# Patient Record
Sex: FEMALE | Race: BLACK OR AFRICAN AMERICAN | NOT HISPANIC OR LATINO | Employment: FULL TIME | URBAN - METROPOLITAN AREA
[De-identification: names, ages, dates, MRNs, and addresses within clinical notes are randomized per-mention and may not be internally consistent; named-entity substitution may affect disease eponyms.]

---

## 2017-03-16 ENCOUNTER — HOSPITAL ENCOUNTER (EMERGENCY)
Facility: OTHER | Age: 77
Discharge: HOME | End: 2017-03-16
Attending: EMERGENCY MEDICINE
Payer: MEDICARE

## 2017-03-16 VITALS
BODY MASS INDEX: 24.33 KG/M2 | RESPIRATION RATE: 18 BRPM | HEART RATE: 92 BPM | OXYGEN SATURATION: 93 % | DIASTOLIC BLOOD PRESSURE: 73 MMHG | SYSTOLIC BLOOD PRESSURE: 161 MMHG | HEIGHT: 67 IN | TEMPERATURE: 99 F | WEIGHT: 155 LBS

## 2017-03-16 DIAGNOSIS — K80.50 BILIARY COLIC: Primary | ICD-10-CM

## 2017-03-16 LAB
ALBUMIN SERPL BCP-MCNC: 3.7 G/DL
ALP SERPL-CCNC: 257 U/L
ALT SERPL W/O P-5'-P-CCNC: 116 U/L
ANION GAP SERPL CALC-SCNC: 8 MMOL/L
AST SERPL-CCNC: 239 U/L
BASOPHILS # BLD AUTO: 0.02 K/UL
BASOPHILS NFR BLD: 0.2 %
BILIRUB SERPL-MCNC: 2.1 MG/DL
BILIRUB UR QL STRIP: NEGATIVE
BUN SERPL-MCNC: 13 MG/DL
CALCIUM SERPL-MCNC: 9.3 MG/DL
CHLORIDE SERPL-SCNC: 104 MMOL/L
CLARITY UR: ABNORMAL
CO2 SERPL-SCNC: 29 MMOL/L
COLOR UR: YELLOW
CREAT SERPL-MCNC: 0.8 MG/DL
DIFFERENTIAL METHOD: ABNORMAL
EOSINOPHIL # BLD AUTO: 0 K/UL
EOSINOPHIL NFR BLD: 0.5 %
ERYTHROCYTE [DISTWIDTH] IN BLOOD BY AUTOMATED COUNT: 12.8 %
EST. GFR  (AFRICAN AMERICAN): >60 ML/MIN/1.73 M^2
EST. GFR  (NON AFRICAN AMERICAN): >60 ML/MIN/1.73 M^2
GLUCOSE SERPL-MCNC: 127 MG/DL
GLUCOSE UR QL STRIP: NEGATIVE
HCT VFR BLD AUTO: 44.7 %
HGB BLD-MCNC: 14.9 G/DL
HGB UR QL STRIP: ABNORMAL
INR PPP: 0.9
KETONES UR QL STRIP: ABNORMAL
LEUKOCYTE ESTERASE UR QL STRIP: ABNORMAL
LIPASE SERPL-CCNC: 17 U/L
LYMPHOCYTES # BLD AUTO: 0.7 K/UL
LYMPHOCYTES NFR BLD: 9.1 %
MAGNESIUM SERPL-MCNC: 2.2 MG/DL
MCH RBC QN AUTO: 31.4 PG
MCHC RBC AUTO-ENTMCNC: 33.3 %
MCV RBC AUTO: 94 FL
MICROSCOPIC COMMENT: ABNORMAL
MONOCYTES # BLD AUTO: 0.2 K/UL
MONOCYTES NFR BLD: 2.3 %
NEUTROPHILS # BLD AUTO: 7.1 K/UL
NEUTROPHILS NFR BLD: 87 %
NITRITE UR QL STRIP: NEGATIVE
PH UR STRIP: 6 [PH] (ref 5–8)
PLATELET # BLD AUTO: 210 K/UL
PMV BLD AUTO: 9.4 FL
POTASSIUM SERPL-SCNC: 4.2 MMOL/L
PROT SERPL-MCNC: 7.8 G/DL
PROT UR QL STRIP: NEGATIVE
PROTHROMBIN TIME: 10.4 SEC
RBC # BLD AUTO: 4.75 M/UL
RBC #/AREA URNS HPF: 5 /HPF (ref 0–4)
SODIUM SERPL-SCNC: 141 MMOL/L
SP GR UR STRIP: 1.02 (ref 1–1.03)
SQUAMOUS #/AREA URNS HPF: 15 /HPF
URN SPEC COLLECT METH UR: ABNORMAL
UROBILINOGEN UR STRIP-ACNC: NEGATIVE EU/DL
WBC # BLD AUTO: 8.11 K/UL
WBC #/AREA URNS HPF: 15 /HPF (ref 0–5)

## 2017-03-16 PROCEDURE — 99284 EMERGENCY DEPT VISIT MOD MDM: CPT

## 2017-03-16 PROCEDURE — 85025 COMPLETE CBC W/AUTO DIFF WBC: CPT

## 2017-03-16 PROCEDURE — 83735 ASSAY OF MAGNESIUM: CPT

## 2017-03-16 PROCEDURE — 83690 ASSAY OF LIPASE: CPT

## 2017-03-16 PROCEDURE — 81000 URINALYSIS NONAUTO W/SCOPE: CPT

## 2017-03-16 PROCEDURE — 80053 COMPREHEN METABOLIC PANEL: CPT

## 2017-03-16 PROCEDURE — 85610 PROTHROMBIN TIME: CPT

## 2017-03-16 RX ORDER — BENZONATATE 100 MG/1
100 CAPSULE ORAL 3 TIMES DAILY PRN
Qty: 15 CAPSULE | Refills: 0 | Status: SHIPPED | OUTPATIENT
Start: 2017-03-16 | End: 2017-03-26

## 2017-03-16 NOTE — ED NOTES
Pt to er with c/o had right upper abd pain this afternoon now painfree. Pt states this pain comes couple of times a year . She has gallstone but refuses to have surgery.

## 2017-03-16 NOTE — DISCHARGE INSTRUCTIONS
Gallstones with Biliary Colic    You have abdominal pain due to irritation and spasm of the gallbladder. This is called biliary colic. The gallbladder is a small sac under the liver, which stores and releases a fluid that aids in the digestion of fat. A collection of crystals may form stones inside the gallbladder (gallstones). Gallstones can cause the gallbladder to spasm. If they block the duct out of the gallbladder, they can cause pain and even an infection.   A number of factors increase the risk for having gallstones:  · Being female  · Being severely overweight (obese)  · Older age  · Losing or gaining weight quickly  · Eating a high-calorie diet  · Being pregnant  · Taking hormone therapy  · Having diabetes  Home care  · Rest in bed.  · Drink only clear liquids until you feel better.  · You may have been prescribed medicine for pain or nausea. Take these as directed.  · Fat in your diet makes the gallbladder contract and may cause increased pain. Avoid foods that are high in fat (such as full-fat dairy, fried foods, and fatty meats) for at least two days.  · If you are overweight, talk to your healthcare provider about losing weight.  Follow-up care  Follow up with your healthcare provider or as advised. There is a chance that you will have another episode of pain from your gallstones at some point. Removal of the gallbladder is an option to prevent this. Talk with your healthcare provider about your treatment options.  When to seek medical advice  Call your healthcare provider if any of the following occur:  · Worsening pain or pain lasting for longer than 6 hours  · Pain moving to the right lower abdomen  · Repeated vomiting  · Swollen abdomen  · Fever of 100.4ºF (38ºC) or higher, or as directed by your healthcare provider  · Very dark urine, light colored stools, or yellow color of the skin or eyes  · Chest, arm, back, neck or jaw pain  Date Last Reviewed: 6/18/2015  © 6080-7398 [employer],  [employer]. [address] [place] [address]. All rights reserved. This information is not intended as a substitute for professional medical care. Always follow your healthcare professional's instructions.          Treating Gallstones    The gallbladder is an organ that stores bile. This is a substance that helps with digestion. Deposits in bile can clump together, creating hard, pebble-like stones. These gallstones may not cause any symptoms. In most cases, gallstones have no symptoms. In some cases, though, they irritate the walls of the gallbladder. Or they can block flow of bile out of the gallbladder. If they fall into the common bile duct, stones can block the flow of bile into the small bowel. This can lead to jaundice, pain, or serious infection. Stones are treated only if you have symptoms. If treatment is needed, your healthcare provider will discuss your choices with you. The most common treatments are listed below.  Medicine  Medicines to dissolve gallstones don't really work.   ERCP  ERCP (endoscopic retrograde cholangiopancreatography) is an outpatient procedure that needs heavy sedation to remove stones. It uses a thin tube with video and X-rays to locate stones blocking the flow of bile. The stones are then removed. ERCP may be done alone. Or it may be used before surgery is done to remove the gallbladder.  Surgery  A cholecystectomy is an operation to remove the gallbladder and its contents (gallstones). Today, most cholecystectomies are done laparoscopically. This means using several very small abdominal incisions. Cholecystectomy may also be done with the traditional single, larger incision.   Prevent future symptoms  After treatment, you should follow a low-fat diet. This diet includes lean meats, lean poultry, and fish. Avoid full-fat dairy products. And limit vegetable oils. Read food labels to be sure theyre low in fat.   Date Last Reviewed: 5/1/2016  © 2798-5011 [employer].  [address] [place] [address]. All rights reserved. This information is not intended as a substitute for professional medical care. Always follow your healthcare professional's instructions.

## 2017-03-16 NOTE — ED NOTES
Lab notified ED that the specimen hemolyzed. ED requested that a phlebotomist be sent down to draw the lab.

## 2017-03-16 NOTE — ED PROVIDER NOTES
Encounter Date: 3/16/2017    SCRIBE #1 NOTE: I, Enriqueta Ricci, am scribing for, and in the presence of, Dr. Ramirez.       History     Chief Complaint   Patient presents with    Abdominal Pain     Pt c/o RUQ abdominal pain x2 days. Pt states she took a norco and denies pain at this time. Pt states she has known gall bladder issues and has been told she should have it removed but refused.      Review of patient's allergies indicates:  No Known Allergies  HPI Comments: 03/16/2017 3:20 PM     Chief Complaint: Abdominal pain    The patient is a 76 y.o. female with HTN and gallstones who presents to the ED with a sudden onset of RUQ abdominal pain PTA. The pain was rated 10/10 but resolved now after taking a [place] PTA. The patient reports prior similar symptoms 2 days ago, and attributed it to eating fatty foods and recent URI. Pain similar to an episode of gallstones pancreatitis dx in September. She was then advised admission, but she decided to leave and follow-up with Dr. Lopez. She did not follow-up with Dr. Lopez but changed her diet with minimal symptoms until this week. The patient had emesis with pain 2d ago but none now. She denies fever, CP/SOB, blood in stool, diarrhea, or any other symptoms at this time. No pertinent SHx noted. No known drug allergies noted.    The history is provided by the patient.     Past Medical History:   Diagnosis Date    Gallstones     Hypertension      Past Surgical History:   Procedure Laterality Date    HYSTERECTOMY       History reviewed. No pertinent family history.  Social History   Substance Use Topics    Smoking status: Never Smoker    Smokeless tobacco: None    Alcohol use Yes      Comment: socially     Review of Systems   Constitutional: Positive for diaphoresis. Negative for chills and fever.   HENT: Negative for congestion, rhinorrhea, sneezing and sore throat.    Eyes: Negative for visual disturbance.   Respiratory: Positive for cough. Negative for shortness  of breath.    Cardiovascular: Negative for chest pain and palpitations.   Gastrointestinal: Positive for abdominal pain (RUQ) and vomiting (resolved). Negative for diarrhea and nausea.   Genitourinary: Negative for dysuria and hematuria.   Musculoskeletal: Negative for back pain and neck pain.   Skin: Negative for rash.   Neurological: Negative for seizures, syncope and headaches.     Physical Exam   Initial Vitals   BP Pulse Resp Temp SpO2   03/16/17 1415 03/16/17 1415 03/16/17 1415 03/16/17 1415 03/16/17 1415   181/82 72 18 98.6 °F (37 °C) 96 %     Physical Exam    Nursing note and vitals reviewed.  Constitutional: No distress.   HENT:   Mouth/Throat: Oropharynx is clear and moist. No oropharyngeal exudate.   Neck: Normal range of motion. Neck supple.   Cardiovascular: Normal rate, regular rhythm and normal heart sounds.   No murmur heard.  Pulmonary/Chest: Breath sounds normal. She has no wheezes. She has no rhonchi. She has no rales.   Abdominal: Soft. There is tenderness (mild) in the right upper quadrant. There is positive Baca's sign. There is no rebound and no guarding.   Musculoskeletal: She exhibits no edema.   Neurological: She is alert and oriented to person, place, and time. She has normal strength. No cranial nerve deficit or sensory deficit.   Skin: No rash noted.       ED Course   Procedures  Labs Reviewed   CBC W/ AUTO DIFFERENTIAL - Abnormal; Notable for the following:        Result Value    MCH 31.4 (*)     Lymph # 0.7 (*)     Mono # 0.2 (*)     Gran% 87.0 (*)     Lymph% 9.1 (*)     Mono% 2.3 (*)     All other components within normal limits   COMPREHENSIVE METABOLIC PANEL - Abnormal; Notable for the following:     Glucose 127 (*)     Total Bilirubin 2.1 (*)     Alkaline Phosphatase 257 (*)      (*)      (*)     All other components within normal limits    Narrative:     Recoll. [id number] by COLBY at 03/16/2017 16:18, reason: specimen   hemolyzed called to : Gabino Archer RN    URINALYSIS - Abnormal; Notable for the following:     Appearance, UA Hazy (*)     Ketones, UA Trace (*)     Occult Blood UA 1+ (*)     Leukocytes, UA 2+ (*)     All other components within normal limits   URINALYSIS MICROSCOPIC - Abnormal; Notable for the following:     RBC, UA 5 (*)     WBC, UA 15 (*)     All other components within normal limits   LIPASE    Narrative:     Recoll. [id number] by [email or web address] at 03/16/2017 16:18, reason: specimen   hemolyzed called to : Gabino Archer RN   MAGNESIUM    Narrative:     Recoll. [id number] by [email or web address] at 03/16/2017 16:18, reason: specimen   hemolyzed called to : Gabino Archer RN   PROTIME-INR     Imaging Results         US Abdomen Limited (Final result) Result time:  03/16/17 17:34:23    Final result by Phoenix Benavidez MD (03/16/17 17:34:23)    Impression:      1.  Gallbladder sludge/calculi, without convincing sonographic evidence of acute cholecystitis.    2.  Possible hepatic steatosis, correlation with LFTs recommended.      Electronically signed by: PHOENIX BENAVIDEZ MD  Date:     03/16/17  Time:    17:34     Narrative:    Ultrasound abdomen limited    Clinical history: Right upper quadrant pain    Comparison: 9/5/2016    Technique:  Real-time sonography was performed of the abdomen using transabdominal technique.    Findings:  The visualized pancreas is unremarkable.  The visualized aorta is unremarkable.  The liver is not enlarged, measuring 15 cm noting increased echogenicity suggestive of steatosis.  The gallbladder is mildly distended.  There is gallbladder sludge and/or calculi.  The gallbladder wall is irregular, without hyperemia.  The common duct is not dilated measuring 0.5 cm.  Sonographic Baca's sign is reportedly negative.  No ascites.  The visualized right kidney is unremarkable.                  Medical Decision Making:   History:   Old Medical Records: I decided to obtain old medical records.  Old Records Summarized: other records.  Initial Assessment:        Patient is a 76 y.o. female with known history of gallstones who presents after an episodes of RUQ pain, improved after taking [place] PTA. She had episode of gallstone pancreatitis last year but refused admission then and did not follow up with Dr. Lopez/Surgery since then. She has changed her diet with minimal episodes of RUQ pain since then until 2 days ago and today, attributed to dietary discretion, recent URI, and stress. Possible biliary colic. Will also evaluate for gallstone pancreatitis and acute cholecystitis.     Update:  Labs show normal white count and chronically elevated LFT's. No sign of pancreatitis. US shows gallstones but no sign of acute cholecystitis. Patient has follow-up with surgery/Dr. Lopez in 5 days and will continue appropriate diet and pain control until then. She also requested treatment for cough from viral URI, so will give Tessalon. No sign PNA. She will return for any worsening pain or other concerns.      Clinical Tests:   Lab Tests: Reviewed and Ordered  Radiological Study: Reviewed and Ordered            Scribe Attestation:   Scribe #1: I performed the above scribed service and the documentation accurately describes the services I performed. I attest to the accuracy of the note.    Attending Attestation:           Physician Attestation for Scribe:  Physician Attestation Statement for Scribe #1: I, Dr. Ramirez, reviewed documentation, as scribed by Enriqueta Ricci in my presence, and it is both accurate and complete.                 ED Course     Clinical Impression:     1. Biliary colic          Disposition:   Disposition: Discharged  Condition: Stable       Luigi Ramirez MD  03/17/17 0124

## 2017-03-16 NOTE — ED AVS SNAPSHOT
[facility]  [address]  [facility] [address]               Karis Briceño   3/16/2017  3:07 PM   ED    Description:  Female : 1940   Department:  [facility]           Your Care was Coordinated By:     Provider Role From To    Luigi Ramirez MD Attending Provider [id number] --      Reason for Visit     Abdominal Pain           Diagnoses this Visit        Comments    Biliary colic    -  Primary       ED Disposition     None           To Do List           Follow-up Information     Follow up with Bill Lopez Jr, MD. Schedule an appointment as soon as possible for a visit in 3 days.    Specialties:  Vascular Surgery, General Surgery    Why:  If symptoms worsen    Contact information:    [address]  [address]  [facility] [address]  [phone]         These Medications        Disp Refills Start End    benzonatate (TESSALON) 100 MG capsule 15 capsule 0 3/16/2017 3/26/2017    Take 1 capsule (100 mg total) by mouth 3 (three) times daily as needed for Cough. - Oral      [facility] On [facility]     [facility] -  Assistance  Registered nurses in the [facility] provide clinical advisement, health education, appointment booking, and other advisory services.  Call for this free service at [phone].             Medications           Message regarding Medications     Verify the changes and/or additions to your medication regime listed below are the same as discussed with your clinician today.  If any of these changes or additions are incorrect, please notify your healthcare provider.        START taking these NEW medications        Refills    benzonatate (TESSALON) 100 MG capsule 0    Sig: Take 1 capsule (100 mg total) by mouth 3 (three) times daily as needed for Cough.    Class: Print    Route: Oral           Verify that the below list of medications is an accurate representation of the medications you  "are currently taking.  If none reported, the list may be blank. If incorrect, please contact your healthcare provider. Carry this list with you in case of emergency.           Current Medications     amlodipine (NORVASC) 5 MG tablet Take 2 tablets (10 mg total) by mouth once daily.    hydrocodone-acetaminophen 5-325mg (NORCO) 5-325 mg per tablet Take 1 tablet by mouth every 4 (four) hours as needed for Pain.    benzonatate (TESSALON) 100 MG capsule Take 1 capsule (100 mg total) by mouth 3 (three) times daily as needed for Cough.           Clinical Reference Information           Your Vitals Were     BP Pulse Temp Resp Height Weight    167/101 84 98.6 °F (37 °C) (Oral) 18 5' 6.5" (1.689 m) 70.3 kg (155 lb)    SpO2 BMI             96% 24.64 kg/m2         Allergies as of 3/16/2017     No Known Allergies      Immunizations Administered on Date of Encounter - 3/16/2017     None      ED Micro, Lab, POCT     Start Ordered       Status Ordering Provider    03/16/17 1533 03/16/17 1532  CBC auto differential  STAT      Final result     03/16/17 1533 03/16/17 1532  Comprehensive metabolic panel  STAT      Final result     03/16/17 1533 03/16/17 1532  Lipase  STAT      Final result     03/16/17 1533 03/16/17 1532  Magnesium  STAT      Final result     03/16/17 1533 03/16/17 1532  Protime-INR  STAT      Final result     03/16/17 1533 03/16/17 1532  Urinalysis  STAT      Final result     03/16/17 1532 03/16/17 1532  Urinalysis Microscopic  Once      Final result     03/16/17 1417 03/16/17 1416    Once,   Status:  Canceled      Canceled       ED Imaging Orders     Start Ordered       Status Ordering Provider    03/16/17 1618 03/16/17 1618  US Abdomen Limited  1 time imaging      Final result     03/16/17 1533 03/16/17 1532    1 time imaging,   Status:  Canceled      Canceled         Discharge Instructions         Gallstones with Biliary Colic    You have abdominal pain due to irritation and spasm of the gallbladder. This is called " biliary colic. The gallbladder is a small sac under the liver, which stores and releases a fluid that aids in the digestion of fat. A collection of crystals may form stones inside the gallbladder (gallstones). Gallstones can cause the gallbladder to spasm. If they block the duct out of the gallbladder, they can cause pain and even an infection.   A number of factors increase the risk for having gallstones:  · Being female  · Being severely overweight (obese)  · Older age  · Losing or gaining weight quickly  · Eating a high-calorie diet  · Being pregnant  · Taking hormone therapy  · Having diabetes  Home care  · Rest in bed.  · Drink only clear liquids until you feel better.  · You may have been prescribed medicine for pain or nausea. Take these as directed.  · Fat in your diet makes the gallbladder contract and may cause increased pain. Avoid foods that are high in fat (such as full-fat dairy, fried foods, and fatty meats) for at least two days.  · If you are overweight, talk to your healthcare provider about losing weight.  Follow-up care  Follow up with your healthcare provider or as advised. There is a chance that you will have another episode of pain from your gallstones at some point. Removal of the gallbladder is an option to prevent this. Talk with your healthcare provider about your treatment options.  When to seek medical advice  Call your healthcare provider if any of the following occur:  · Worsening pain or pain lasting for longer than 6 hours  · Pain moving to the right lower abdomen  · Repeated vomiting  · Swollen abdomen  · Fever of 100.4ºF (38ºC) or higher, or as directed by your healthcare provider  · Very dark urine, light colored stools, or yellow color of the skin or eyes  · Chest, arm, back, neck or jaw pain  Date Last Reviewed: 6/18/2015  © 8327-0254 "[employer]. [address] [place] [address], [place] [address]. All rights reserved. This information is not intended as a substitute for  professional medical care. Always follow your healthcare professional's instructions.          Treating Gallstones    The gallbladder is an organ that stores bile. This is a substance that helps with digestion. Deposits in bile can clump together, creating hard, pebble-like stones. These gallstones may not cause any symptoms. In most cases, gallstones have no symptoms. In some cases, though, they irritate the walls of the gallbladder. Or they can block flow of bile out of the gallbladder. If they fall into the common bile duct, stones can block the flow of bile into the small bowel. This can lead to jaundice, pain, or serious infection. Stones are treated only if you have symptoms. If treatment is needed, your healthcare provider will discuss your choices with you. The most common treatments are listed below.  Medicine  Medicines to dissolve gallstones don't really work.   ERCP  ERCP (endoscopic retrograde cholangiopancreatography) is an outpatient procedure that needs heavy sedation to remove stones. It uses a thin tube with video and X-rays to locate stones blocking the flow of bile. The stones are then removed. ERCP may be done alone. Or it may be used before surgery is done to remove the gallbladder.  Surgery  A cholecystectomy is an operation to remove the gallbladder and its contents (gallstones). Today, most cholecystectomies are done laparoscopically. This means using several very small abdominal incisions. Cholecystectomy may also be done with the traditional single, larger incision.   Prevent future symptoms  After treatment, you should follow a low-fat diet. This diet includes lean meats, lean poultry, and fish. Avoid full-fat dairy products. And limit vegetable oils. Read food labels to be sure theyre low in fat.   Date Last Reviewed: 5/1/2016  © 9541-3712 [employer]. [address] [place] [address], [place] [address]. All rights reserved. This information is not intended as a substitute for  professional medical care. Always follow your healthcare professional's instructions.          Discharge References/Attachments     URI, VIRAL, NO ABX (ADULT) (ENGLISH)      [facility] Sign-Up     Activating your [facility] account is as easy as 1-2-3!     1) Visit [email or web address], select Sign Up Now, enter this activation code and your date of birth, then select Next.  [email or web address]  Expires: 4/30/2017  6:09 PM      2) Create a username and password to use when you visit [facility] in the future and select a security question in case you lose your password and select Next.    3) Enter your e-mail address and click Sign Up!    Additional Information  If you have questions, please e-mail [email or web address] or call [phone] to talk to our [facility] staff. Remember, [facility] is NOT to be used for urgent needs. For medical emergencies, dial 911.          [facility] complies with applicable Federal civil rights laws and does not discriminate on the basis of race, color, national origin, age, disability, or sex.        Language Assistance Services     ATTENTION: Language assistance services are available, free of charge. Please call [phone].      ATENCIÓN: Si habla joanie, tiene a turner disposición servicios gratuitos de asistencia lingüística. Llame al [phone].     [facility] Ý: N?u b?n nói Ti?ng Vi?t, có các d?ch v? h? tr? ngôn ng? mi?n phí dành cho b?n. G?i s? [phone].

## 2018-09-05 ENCOUNTER — HOSPITAL ENCOUNTER (OUTPATIENT)
Dept: RADIOLOGY | Facility: OTHER | Age: 78
Discharge: HOME | End: 2018-09-05
Attending: OTOLARYNGOLOGY
Payer: MEDICARE

## 2018-09-05 DIAGNOSIS — Z01.812 PRE-OPERATIVE LABORATORY EXAMINATION: Primary | ICD-10-CM

## 2018-09-05 PROCEDURE — 25500020 PHARM REV CODE 255: Performed by: OTOLARYNGOLOGY

## 2018-09-05 PROCEDURE — 70481 CT ORBIT/EAR/FOSSA W/DYE: CPT | Mod: TC

## 2018-09-05 PROCEDURE — 70481 CT ORBIT/EAR/FOSSA W/DYE: CPT | Mod: 26,,, | Performed by: RADIOLOGY

## 2018-09-05 RX ADMIN — IOHEXOL 75 ML: 350 INJECTION, SOLUTION INTRAVENOUS at 10:09

## 2019-09-03 ENCOUNTER — HOSPITAL ENCOUNTER (OUTPATIENT)
Facility: OTHER | Age: 79
Discharge: HOME | End: 2019-09-05
Attending: EMERGENCY MEDICINE | Admitting: EMERGENCY MEDICINE
Payer: MEDICARE

## 2019-09-03 DIAGNOSIS — R94.31 ABNORMAL EKG: ICD-10-CM

## 2019-09-03 DIAGNOSIS — I10 HYPERTENSION, UNCONTROLLED: ICD-10-CM

## 2019-09-03 DIAGNOSIS — I10 HYPERTENSION: ICD-10-CM

## 2019-09-03 DIAGNOSIS — R79.89 ELEVATED BRAIN NATRIURETIC PEPTIDE (BNP) LEVEL: ICD-10-CM

## 2019-09-03 DIAGNOSIS — I16.9 HYPERTENSIVE CRISIS: ICD-10-CM

## 2019-09-03 DIAGNOSIS — R07.9 CHEST PAIN: ICD-10-CM

## 2019-09-03 DIAGNOSIS — R79.89 ELEVATED TROPONIN: Primary | ICD-10-CM

## 2019-09-03 LAB
ALBUMIN SERPL BCP-MCNC: 3.8 G/DL (ref 3.5–5.2)
ALP SERPL-CCNC: 54 U/L (ref 55–135)
ALT SERPL W/O P-5'-P-CCNC: 18 U/L (ref 10–44)
ANION GAP SERPL CALC-SCNC: 10 MMOL/L (ref 8–16)
AST SERPL-CCNC: 22 U/L (ref 10–40)
BACTERIA #/AREA URNS HPF: NORMAL /HPF
BASOPHILS # BLD AUTO: 0.04 K/UL (ref 0–0.2)
BASOPHILS NFR BLD: 0.8 % (ref 0–1.9)
BILIRUB SERPL-MCNC: 0.5 MG/DL (ref 0.1–1)
BILIRUB UR QL STRIP: NEGATIVE
BNP SERPL-MCNC: 140 PG/ML (ref 0–99)
BUN SERPL-MCNC: 16 MG/DL (ref 8–23)
CALCIUM SERPL-MCNC: 9.1 MG/DL (ref 8.7–10.5)
CHLORIDE SERPL-SCNC: 104 MMOL/L (ref 95–110)
CLARITY UR: CLEAR
CO2 SERPL-SCNC: 26 MMOL/L (ref 23–29)
COLOR UR: YELLOW
CREAT SERPL-MCNC: 0.8 MG/DL (ref 0.5–1.4)
DIFFERENTIAL METHOD: ABNORMAL
EOSINOPHIL # BLD AUTO: 0.2 K/UL (ref 0–0.5)
EOSINOPHIL NFR BLD: 4.7 % (ref 0–8)
ERYTHROCYTE [DISTWIDTH] IN BLOOD BY AUTOMATED COUNT: 12.3 % (ref 11.5–14.5)
EST. GFR  (AFRICAN AMERICAN): >60 ML/MIN/1.73 M^2
EST. GFR  (NON AFRICAN AMERICAN): >60 ML/MIN/1.73 M^2
GLUCOSE SERPL-MCNC: 117 MG/DL (ref 70–110)
GLUCOSE UR QL STRIP: NEGATIVE
HCT VFR BLD AUTO: 43.7 % (ref 37–48.5)
HGB BLD-MCNC: 14.3 G/DL (ref 12–16)
HGB UR QL STRIP: ABNORMAL
IMM GRANULOCYTES # BLD AUTO: 0.03 K/UL (ref 0–0.04)
IMM GRANULOCYTES NFR BLD AUTO: 0.6 % (ref 0–0.5)
KETONES UR QL STRIP: NEGATIVE
LEUKOCYTE ESTERASE UR QL STRIP: ABNORMAL
LYMPHOCYTES # BLD AUTO: 1.9 K/UL (ref 1–4.8)
LYMPHOCYTES NFR BLD: 36.2 % (ref 18–48)
MCH RBC QN AUTO: 31.2 PG (ref 27–31)
MCHC RBC AUTO-ENTMCNC: 32.7 G/DL (ref 32–36)
MCV RBC AUTO: 95 FL (ref 82–98)
MICROSCOPIC COMMENT: NORMAL
MONOCYTES # BLD AUTO: 0.3 K/UL (ref 0.3–1)
MONOCYTES NFR BLD: 6.6 % (ref 4–15)
NEUTROPHILS # BLD AUTO: 2.6 K/UL (ref 1.8–7.7)
NEUTROPHILS NFR BLD: 51.1 % (ref 38–73)
NITRITE UR QL STRIP: NEGATIVE
NRBC BLD-RTO: 0 /100 WBC
PH UR STRIP: 8 [PH] (ref 5–8)
PLATELET # BLD AUTO: 203 K/UL (ref 150–350)
PMV BLD AUTO: 9.3 FL (ref 9.2–12.9)
POTASSIUM SERPL-SCNC: 3.8 MMOL/L (ref 3.5–5.1)
PROT SERPL-MCNC: 7.2 G/DL (ref 6–8.4)
PROT UR QL STRIP: NEGATIVE
RBC # BLD AUTO: 4.59 M/UL (ref 4–5.4)
RBC #/AREA URNS HPF: 1 /HPF (ref 0–4)
SODIUM SERPL-SCNC: 140 MMOL/L (ref 136–145)
SP GR UR STRIP: 1.01 (ref 1–1.03)
TROPONIN I SERPL DL<=0.01 NG/ML-MCNC: 0.05 NG/ML (ref 0–0.03)
URN SPEC COLLECT METH UR: ABNORMAL
UROBILINOGEN UR STRIP-ACNC: NEGATIVE EU/DL
WBC # BLD AUTO: 5.16 K/UL (ref 3.9–12.7)
WBC #/AREA URNS HPF: 2 /HPF (ref 0–5)

## 2019-09-03 PROCEDURE — 84484 ASSAY OF TROPONIN QUANT: CPT

## 2019-09-03 PROCEDURE — 81000 URINALYSIS NONAUTO W/SCOPE: CPT

## 2019-09-03 PROCEDURE — 93010 EKG 12-LEAD: ICD-10-PCS | Mod: ,,, | Performed by: INTERNAL MEDICINE

## 2019-09-03 PROCEDURE — 93010 ELECTROCARDIOGRAM REPORT: CPT | Mod: ,,, | Performed by: INTERNAL MEDICINE

## 2019-09-03 PROCEDURE — 85025 COMPLETE CBC W/AUTO DIFF WBC: CPT

## 2019-09-03 PROCEDURE — 25000003 PHARM REV CODE 250: Performed by: EMERGENCY MEDICINE

## 2019-09-03 PROCEDURE — 99285 EMERGENCY DEPT VISIT HI MDM: CPT | Mod: 25

## 2019-09-03 PROCEDURE — 80053 COMPREHEN METABOLIC PANEL: CPT

## 2019-09-03 PROCEDURE — 63600175 PHARM REV CODE 636 W HCPCS: Performed by: EMERGENCY MEDICINE

## 2019-09-03 PROCEDURE — 93005 ELECTROCARDIOGRAM TRACING: CPT

## 2019-09-03 PROCEDURE — G0378 HOSPITAL OBSERVATION PER HR: HCPCS

## 2019-09-03 PROCEDURE — 83880 ASSAY OF NATRIURETIC PEPTIDE: CPT

## 2019-09-03 RX ORDER — CLONIDINE HYDROCHLORIDE 0.1 MG/1
0.2 TABLET ORAL
Status: COMPLETED | OUTPATIENT
Start: 2019-09-03 | End: 2019-09-03

## 2019-09-03 RX ORDER — HYDRALAZINE HYDROCHLORIDE 20 MG/ML
10 INJECTION INTRAMUSCULAR; INTRAVENOUS EVERY 6 HOURS PRN
Status: DISCONTINUED | OUTPATIENT
Start: 2019-09-03 | End: 2019-09-05

## 2019-09-03 RX ORDER — NITROGLYCERIN 0.4 MG/1
0.4 TABLET SUBLINGUAL
Status: COMPLETED | OUTPATIENT
Start: 2019-09-03 | End: 2019-09-03

## 2019-09-03 RX ORDER — HYDRALAZINE HYDROCHLORIDE 20 MG/ML
10 INJECTION INTRAMUSCULAR; INTRAVENOUS EVERY 6 HOURS PRN
Status: DISCONTINUED | OUTPATIENT
Start: 2019-09-04 | End: 2019-09-03

## 2019-09-03 RX ORDER — AMLODIPINE BESYLATE 5 MG/1
5 TABLET ORAL
Status: COMPLETED | OUTPATIENT
Start: 2019-09-03 | End: 2019-09-03

## 2019-09-03 RX ORDER — AMLODIPINE BESYLATE 5 MG/1
5 TABLET ORAL DAILY
Status: DISCONTINUED | OUTPATIENT
Start: 2019-09-04 | End: 2019-09-03

## 2019-09-03 RX ORDER — ASPIRIN 325 MG
325 TABLET ORAL
Status: COMPLETED | OUTPATIENT
Start: 2019-09-03 | End: 2019-09-03

## 2019-09-03 RX ADMIN — CLONIDINE HYDROCHLORIDE 0.2 MG: 0.1 TABLET ORAL at 08:09

## 2019-09-03 RX ADMIN — AMLODIPINE BESYLATE 5 MG: 5 TABLET ORAL at 11:09

## 2019-09-03 RX ADMIN — NITROGLYCERIN 0.4 MG: 0.4 TABLET, ORALLY DISINTEGRATING SUBLINGUAL at 11:09

## 2019-09-03 RX ADMIN — ASPIRIN 325 MG ORAL TABLET 325 MG: 325 PILL ORAL at 11:09

## 2019-09-04 ENCOUNTER — HOSPITAL ENCOUNTER (OUTPATIENT)
Dept: CARDIOLOGY | Facility: OTHER | Age: 79
Discharge: HOME | End: 2019-09-04
Attending: EMERGENCY MEDICINE
Payer: MEDICARE

## 2019-09-04 LAB
TROPONIN I SERPL DL<=0.01 NG/ML-MCNC: 0.05 NG/ML (ref 0–0.03)
TROPONIN I SERPL DL<=0.01 NG/ML-MCNC: 0.05 NG/ML (ref 0–0.03)

## 2019-09-04 PROCEDURE — 25000003 PHARM REV CODE 250: Performed by: PHYSICIAN ASSISTANT

## 2019-09-04 PROCEDURE — 99220 PR INITIAL OBSERVATION CARE,LEVL III: ICD-10-PCS | Mod: ,,, | Performed by: PHYSICIAN ASSISTANT

## 2019-09-04 PROCEDURE — 94761 N-INVAS EAR/PLS OXIMETRY MLT: CPT

## 2019-09-04 PROCEDURE — 93010 ELECTROCARDIOGRAM REPORT: CPT | Mod: ,,, | Performed by: INTERNAL MEDICINE

## 2019-09-04 PROCEDURE — 93010 EKG 12-LEAD: ICD-10-PCS | Mod: ,,, | Performed by: INTERNAL MEDICINE

## 2019-09-04 PROCEDURE — 99220 PR INITIAL OBSERVATION CARE,LEVL III: CPT | Mod: ,,, | Performed by: PHYSICIAN ASSISTANT

## 2019-09-04 PROCEDURE — 99900035 HC TECH TIME PER 15 MIN (STAT)

## 2019-09-04 PROCEDURE — 93306 TTE W/DOPPLER COMPLETE: CPT

## 2019-09-04 PROCEDURE — G0378 HOSPITAL OBSERVATION PER HR: HCPCS

## 2019-09-04 PROCEDURE — 36415 COLL VENOUS BLD VENIPUNCTURE: CPT

## 2019-09-04 PROCEDURE — 63600175 PHARM REV CODE 636 W HCPCS: Performed by: PHYSICIAN ASSISTANT

## 2019-09-04 PROCEDURE — 84484 ASSAY OF TROPONIN QUANT: CPT

## 2019-09-04 PROCEDURE — 93306 ECHO (CUPID ONLY): ICD-10-PCS | Mod: 26,,, | Performed by: INTERNAL MEDICINE

## 2019-09-04 PROCEDURE — 93005 ELECTROCARDIOGRAM TRACING: CPT

## 2019-09-04 PROCEDURE — 93306 TTE W/DOPPLER COMPLETE: CPT | Mod: 26,,, | Performed by: INTERNAL MEDICINE

## 2019-09-04 RX ORDER — ENOXAPARIN SODIUM 100 MG/ML
40 INJECTION SUBCUTANEOUS EVERY 24 HOURS
Status: DISCONTINUED | OUTPATIENT
Start: 2019-09-04 | End: 2019-09-05 | Stop reason: HOSPADM

## 2019-09-04 RX ORDER — HYDROCHLOROTHIAZIDE 12.5 MG/1
12.5 TABLET ORAL DAILY
Qty: 30 TABLET | Refills: 0 | Status: SHIPPED | OUTPATIENT
Start: 2019-09-04 | End: 2019-09-11 | Stop reason: SDUPTHER

## 2019-09-04 RX ORDER — ONDANSETRON 8 MG/1
8 TABLET, ORALLY DISINTEGRATING ORAL EVERY 8 HOURS PRN
Status: DISCONTINUED | OUTPATIENT
Start: 2019-09-04 | End: 2019-09-05 | Stop reason: HOSPADM

## 2019-09-04 RX ORDER — HEPARIN SODIUM 5000 [USP'U]/ML
5000 INJECTION, SOLUTION INTRAVENOUS; SUBCUTANEOUS EVERY 8 HOURS
Status: DISCONTINUED | OUTPATIENT
Start: 2019-09-04 | End: 2019-09-04

## 2019-09-04 RX ORDER — HYDROCHLOROTHIAZIDE 12.5 MG/1
12.5 TABLET ORAL DAILY
Status: DISCONTINUED | OUTPATIENT
Start: 2019-09-04 | End: 2019-09-05 | Stop reason: HOSPADM

## 2019-09-04 RX ORDER — SODIUM CHLORIDE 0.9 % (FLUSH) 0.9 %
10 SYRINGE (ML) INJECTION
Status: DISCONTINUED | OUTPATIENT
Start: 2019-09-04 | End: 2019-09-05 | Stop reason: HOSPADM

## 2019-09-04 RX ORDER — AMLODIPINE BESYLATE 10 MG/1
10 TABLET ORAL DAILY
Qty: 30 TABLET | Refills: 0 | Status: SHIPPED | OUTPATIENT
Start: 2019-09-04 | End: 2019-09-11 | Stop reason: SDUPTHER

## 2019-09-04 RX ORDER — AMOXICILLIN 500 MG
1 CAPSULE ORAL DAILY
COMMUNITY

## 2019-09-04 RX ORDER — ACETAMINOPHEN 325 MG/1
650 TABLET ORAL EVERY 4 HOURS PRN
Status: DISCONTINUED | OUTPATIENT
Start: 2019-09-04 | End: 2019-09-05 | Stop reason: HOSPADM

## 2019-09-04 RX ORDER — SODIUM CHLORIDE 0.9 % (FLUSH) 0.9 %
10 SYRINGE (ML) INJECTION
Status: DISCONTINUED | OUTPATIENT
Start: 2019-09-04 | End: 2019-09-04

## 2019-09-04 RX ORDER — ONDANSETRON 2 MG/ML
4 INJECTION INTRAMUSCULAR; INTRAVENOUS EVERY 8 HOURS PRN
Status: DISCONTINUED | OUTPATIENT
Start: 2019-09-04 | End: 2019-09-05 | Stop reason: HOSPADM

## 2019-09-04 RX ORDER — AMLODIPINE BESYLATE 5 MG/1
10 TABLET ORAL DAILY
Status: DISCONTINUED | OUTPATIENT
Start: 2019-09-04 | End: 2019-09-05 | Stop reason: HOSPADM

## 2019-09-04 RX ADMIN — HYDROCHLOROTHIAZIDE 12.5 MG: 12.5 TABLET ORAL at 10:09

## 2019-09-04 RX ADMIN — HYDRALAZINE HYDROCHLORIDE 10 MG: 20 INJECTION INTRAMUSCULAR; INTRAVENOUS at 04:09

## 2019-09-04 RX ADMIN — AMLODIPINE BESYLATE 10 MG: 5 TABLET ORAL at 08:09

## 2019-09-04 NOTE — PLAN OF CARE
09/04/19 1016   Final Note   Assessment Type Final Discharge Note   Anticipated Discharge Disposition Home   What phone number can be called within the next 1-3 days to see how you are doing after discharge? [phone]   Hospital Follow Up  Appt(s) scheduled? Yes   Discharge plans and expectations educations in teach back method with documentation complete? Yes   Right Care Referral Info   Post Acute Recommendation No Care

## 2019-09-04 NOTE — ED PROVIDER NOTES
"Encounter Date: 9/3/2019    SCRIBE #1 NOTE: I, Reid Medel, am scribing for, and in the presence of, Dr. Pollock.       History     Chief Complaint   Patient presents with    Fatigue     woke up this morning and went to work and was still not feeling well so went to urgent care and was sent here     Time seen by provider: 8:41 PM    This is a 79 y.o. female who presents with complaint of fatigue that began this morning. The patient reports that she woke up this morning "not feeling like my usual bubbly self. She ate a banana before going to work this morning. Everyone at work was telling her that she wasn't acting like herself. She reports that she was experiencing mild eye redness and a headache. She attributes her bilateral eye redness to working throughout the weekend. Her headache has since dissipated. She was sent to the ED from urgent care due to hypertension - she had a systolic blood pressure greater than 250. She denies fever, chills, sore throat, chest pain, shortness of breath, nausea, and dysuria. She hasn't been taking her blood pressure medication in the last year - she states that she started taking garlic instead of her amlodipine, but also admits she did not check her blood pressure over that year. She denies smoking, but admits to drinking four beers a week.     The history is provided by the patient.     Review of patient's allergies indicates:  No Known Allergies  Past Medical History:   Diagnosis Date    Gallstones     Hypertension      Past Surgical History:   Procedure Laterality Date    HYSTERECTOMY       History reviewed. No pertinent family history.  Social History     Tobacco Use    Smoking status: Never Smoker   Substance Use Topics    Alcohol use: Yes     Comment: socially    Drug use: No     Review of Systems   Constitutional: Negative for chills and fever.   HENT: Negative for congestion and sore throat.    Eyes: Positive for redness. Negative for visual disturbance. "   Respiratory: Negative for cough and shortness of breath.    Cardiovascular: Negative for chest pain and palpitations.   Gastrointestinal: Negative for abdominal pain, constipation, diarrhea and vomiting.   Genitourinary: Negative for decreased urine volume, dysuria and vaginal discharge.   Musculoskeletal: Negative for joint swelling, neck pain and neck stiffness.   Skin: Negative for rash and wound.   Neurological: Positive for headaches. Negative for weakness and numbness.   Psychiatric/Behavioral: Negative for confusion.       Physical Exam     Initial Vitals [09/03/19 1932]   BP Pulse Resp Temp SpO2   (!) 246/116 70 16 98 °F (36.7 °C) 98 %      MAP       --         Physical Exam    Nursing note and vitals reviewed.  Constitutional: She appears well-developed and well-nourished. She is not diaphoretic. No distress.   HENT:   Head: Normocephalic and atraumatic.   Nose: Nose normal.   Mouth/Throat: Oropharynx is clear and moist.   Eyes: Conjunctivae and EOM are normal. Pupils are equal, round, and reactive to light.   Neck: Normal range of motion.   Cardiovascular: Normal rate, regular rhythm and normal heart sounds. Exam reveals no gallop and no friction rub.    No murmur heard.  Pulmonary/Chest: Breath sounds normal. No respiratory distress. She has no wheezes. She has no rhonchi. She has no rales.   Abdominal: Soft. There is no tenderness. There is no rebound and no guarding.   Musculoskeletal: Normal range of motion. She exhibits no edema or tenderness.   No calf tenderness or edema.    Neurological: She is alert and oriented to person, place, and time. She has normal strength. No cranial nerve deficit or sensory deficit.   Skin: Skin is warm and dry. No rash and no abscess noted. No erythema. No pallor.   Psychiatric: She has a normal mood and affect. Her behavior is normal. Judgment and thought content normal.         ED Course   Critical Care  Date/Time: 9/16/2019 3:12 PM  Performed by: Latricia Pollock  MD  Authorized by: Latricia Pollock MD   Direct patient critical care time: 16 minutes  Additional history critical care time: 6 minutes  Ordering / reviewing critical care time: 6 minutes  Documentation critical care time: 6 minutes  Consulting other physicians critical care time: 6 minutes  Total critical care time (exclusive of procedural time) : 40 minutes  Critical care time was exclusive of separately billable procedures and treating other patients.  Critical care was necessary to treat or prevent imminent or life-threatening deterioration of the following conditions: cardiac failure.  Critical care was time spent personally by me on the following activities: blood draw for specimens, evaluation of patient's response to treatment, ordering and performing treatments and interventions, pulse oximetry, re-evaluation of patient's condition, ordering and review of laboratory studies, examination of patient, development of treatment plan with patient or surrogate, discussions with consultants, obtaining history from patient or surrogate, ordering and review of radiographic studies and review of old charts.        Labs Reviewed   CBC W/ AUTO DIFFERENTIAL - Abnormal; Notable for the following components:       Result Value    Mean Corpuscular Hemoglobin 31.2 (*)     Immature Granulocytes 0.6 (*)     All other components within normal limits   COMPREHENSIVE METABOLIC PANEL - Abnormal; Notable for the following components:    Glucose 117 (*)     Alkaline Phosphatase 54 (*)     All other components within normal limits   URINALYSIS, REFLEX TO URINE CULTURE - Abnormal; Notable for the following components:    Occult Blood UA Trace (*)     Leukocytes, UA Trace (*)     All other components within normal limits    Narrative:     Preferred Collection Type->Urine, Clean Catch   TROPONIN I - Abnormal; Notable for the following components:    Troponin I 0.053 (*)     All other components within normal limits   B-TYPE NATRIURETIC  PEPTIDE - Abnormal; Notable for the following components:     (*)     All other components within normal limits   URINALYSIS MICROSCOPIC    Narrative:     Preferred Collection Type->Urine, Clean Catch   TROPONIN I     EKG Readings: (Independently Interpreted)   Initial Reading: No STEMI.   Normal sinus rhythm at a rate of 72 bpm. Non specific T wave abrnomalities present.        Imaging Results    None          Medical Decision Making:   Independently Interpreted Test(s):   I have ordered and independently interpreted EKG Reading(s) - see prior notes  Clinical Tests:   Lab Tests: Reviewed  Medical Tests: Reviewed  ED Management:  Emergent evaluation a 79-year-old female who presents with complaint of nonspecific fatigue and malaise, found to be significantly hypertensive at an urgent care.  Vital signs reveal significant hypertension, systolic blood pressure greater than 240.  She is afebrile.  Physical exam is completely benign, no neurologic deficit.  Patient denies any current headache, and denies ever having chest pain, shortness of breath, numbness or weakness. She was given a dose of clonidine while in the triage area, and workup begun.  When I took over her care I noted abnormal T-wave appearance in lateral leads in added on troponin which is slightly elevated.  During ER course she did develop chest pain which is treated with nitroglycerin and aspirin.  She will be admitted to the hospital service for further care.            Scribe Attestation:   Scribe #1: I performed the above scribed service and the documentation accurately describes the services I performed. I attest to the accuracy of the note.    Attending Attestation:           Physician Attestation for Scribe:  Physician Attestation Statement for Scribe #1: I, Dr. Pollock, reviewed documentation, as scribed by Reid Medel  in my presence, and it is both accurate and complete.                    Clinical Impression:     1. Elevated troponin     2. Hypertension    3. Hypertensive crisis    4. Abnormal EKG                                   Latricia Pollock MD  09/03/19 7598       Latricia Pollock MD  09/16/19 2308

## 2019-09-04 NOTE — SUBJECTIVE & OBJECTIVE
Interval History: no acute overnight events, BP slowly improving    Review of Systems   Constitutional: Negative for chills.   Respiratory: Negative for cough, shortness of breath and wheezing.    Cardiovascular: Negative for chest pain, palpitations and leg swelling.   Gastrointestinal: Negative for abdominal pain, diarrhea, nausea and vomiting.   Genitourinary: Negative for dysuria, flank pain, frequency, hematuria and urgency.   Neurological: Negative for dizziness and headaches.     Objective:     Vital Signs (Most Recent):  Temp: 97.2 °F (36.2 °C) (09/04/19 0830)  Pulse: 82 (09/04/19 1400)  Resp: 16 (09/04/19 0743)  BP: (!) 170/85 (09/04/19 0830)  SpO2: 98 % (09/04/19 0830) Vital Signs (24h Range):  Temp:  [96.2 °F (35.7 °C)-98 °F (36.7 °C)] 97.2 °F (36.2 °C)  Pulse:  [61-82] 82  Resp:  [16-24] 16  SpO2:  [94 %-98 %] 98 %  BP: (156-254)/() 170/85     Weight: 81.6 kg (179 lb 14.3 oz)  Body mass index is 28.6 kg/m².  No intake or output data in the 24 hours ending 09/04/19 1620   Physical Exam   Constitutional: She is oriented to person, place, and time. She appears well-developed and well-nourished. No distress.   HENT:   Head: Normocephalic and atraumatic.   Eyes: Conjunctivae are normal. No scleral icterus.   Neck: Normal range of motion. Neck supple.   Cardiovascular: Normal rate, regular rhythm, normal heart sounds and intact distal pulses.   Pulmonary/Chest: Effort normal and breath sounds normal.   Abdominal: Soft. Bowel sounds are normal.   Musculoskeletal: She exhibits no edema.   Neurological: She is alert and oriented to person, place, and time.   Skin: Skin is warm and dry. She is not diaphoretic. No pallor.   Nursing note and vitals reviewed.      Significant Labs:   CBC:   Recent Labs   Lab 09/03/19 2058   WBC 5.16   HGB 14.3   HCT 43.7        CMP:   Recent Labs   Lab 09/03/19 2058      K 3.8      CO2 26   *   BUN 16   CREATININE 0.8   CALCIUM 9.1   PROT 7.2    ALBUMIN 3.8   BILITOT 0.5   ALKPHOS 54*   AST 22   ALT 18   ANIONGAP 10   EGFRNONAA >60     Troponin:   Recent Labs   Lab 09/03/19 2058 09/04/19  0239 09/04/19  0818   TROPONINI 0.053* 0.046* 0.051*     All pertinent labs within the past 24 hours have been reviewed.    Significant Imaging: I have reviewed all pertinent imaging results/findings within the past 24 hours.   Imaging Results          X-Ray Chest PA And Lateral (Final result)  Result time 09/04/19 00:43:08    Final result by Francheska Waggoner MD (09/04/19 00:43:08)                 Impression:      No acute intrathoracic abnormality.      Electronically signed by: Francheska Waggoner  Date:    09/04/2019  Time:    00:43             Narrative:    EXAMINATION:  CHEST PA AND LATERAL    CLINICAL HISTORY:  Chest pain, unspecified    TECHNIQUE:  PA and lateral chest radiograph    FINDINGS:  The cardiac silhouette is within normal limits.   There is no focal consolidation, pneumothorax, or pleural effusion.  Mild linear plates of atelectasis are seen in the left mid lung field.  Dextroscoliosis is present.  There are spondylitic changes.

## 2019-09-04 NOTE — ASSESSMENT & PLAN NOTE
- suspect 2/2 significantly elevated BP   - trend w/ EKGs as initial EKG showed lateral T wave inversions   - cardiology consulted

## 2019-09-04 NOTE — CONSULTS
[facility]  Consult    History of Present Illness: Ms Briceño is a 79-year-old lady who presented to the emergency room with lightheadedness, headaches, and pain behind the left eye.  She has a longstanding history of hypertension, used to be on Norvasc 10 mg a day.  She says she stopped taking her Norvasc about a year ago, and thought she would take better care of herself by taking garlic and fish oil tablets instead.  She says she is physically active, has no exertional chest discomfort or shortness of breath.    In the past she has had hypertension and she has had gallstones.  She says she last had an attack of right upper quadrant abdominal pain about a year ago.  She is a nonsmoker.  Her mother  when she was 78-docti-kin, of childbirth.  Her father  in his 50s and she is not sure of the cause of death.  One step brother  an accidental death.  One younger sister  of an unknown cause.  She says she knows of no history of high blood pressure or heart disease in her family.    PTA Medications   Medication Sig    [DISCONTINUED] amlodipine (NORVASC) 5 MG tablet Take 2 tablets (10 mg total) by mouth once daily.       Review of patient's allergies indicates:  No Known Allergies    Past Medical History:   Diagnosis Date    Gallstones     Hypertension      Past Surgical History:   Procedure Laterality Date    HYSTERECTOMY       History reviewed. No pertinent family history.  Social History     Tobacco Use    Smoking status: Never Smoker   Substance Use Topics    Alcohol use: Yes     Comment: socially    Drug use: No        Physical Exam:  An overweight pleasant lady in no apparent acute distress  Carotid upstroke is brisk there is no carotid bruit.  The chest is clear  The heart size is grossly normal S1 and S2 are normal there is no appreciabe murmur or gallop.  The extremities are warm.  There is no pedal edema.    The EKG shows left ventricular hypertrophy.  Primary T-wave  inversions across the precordial leads could possibly represent myocardial ischemia.  There is a minimal troponin elevation.    Impression:  Symptomatic uncontrolled hypertension                         The troponin elevation is probably a type 2 mechanism from uncontrolled hypertension.  With the abnormal electrocardiogram and troponin elevation, myocardial ischemia should be excluded by a Lexiscan Cardiolite test      Discussed with Sophie Jones  Thank you for the opportunity of seeing Karis Briceño in consultation

## 2019-09-04 NOTE — PROGRESS NOTES
"[facility] Hospital Medicine  Progress Note    Patient Name: Karis Briceño  MRN: [id number]  Patient Class: OP- Observation   Admission Date: 9/3/2019  Length of Stay: 0 days  Attending Physician: Bill Tom MD  Primary Care Provider: Bubba Bustos MD        Subjective:     Principal Problem:Hypertension, uncontrolled        HPI:  Ms. Karis Briceño is a 79 y.o. female, with PMH of HTN (stopped amlodipine ~1 year ago, taking garlic instead), pancreatitis, who [facility] on 9/3/19 2/2 fatigue and "not feeling like my usual bubbly self." She states she has felt tired, had a headache, and had eye redness. She went to Urgent Care, where her SBP was measured in the 250's. She was sent to the ED. She denies fever, chills, chest pain, SOB, nausea, dysuria. She is a non-smoker, but does drink 4 beers/week. She was evaluated in the ED, and found to have elevated BP, which improved after clonidine 0.2 mg. EKG showed T wave inversions in lateral leads, troponin was elevated. She was placed on OBS.     Overview/Hospital Course:  No notes on file    Interval History: no acute overnight events, BP slowly improving    Review of Systems   Constitutional: Negative for chills.   Respiratory: Negative for cough, shortness of breath and wheezing.    Cardiovascular: Negative for chest pain, palpitations and leg swelling.   Gastrointestinal: Negative for abdominal pain, diarrhea, nausea and vomiting.   Genitourinary: Negative for dysuria, flank pain, frequency, hematuria and urgency.   Neurological: Negative for dizziness and headaches.     Objective:     Vital Signs (Most Recent):  Temp: 97.2 °F (36.2 °C) (09/04/19 0830)  Pulse: 82 (09/04/19 1400)  Resp: 16 (09/04/19 0743)  BP: (!) 170/85 (09/04/19 0830)  SpO2: 98 % (09/04/19 0830) Vital Signs (24h Range):  Temp:  [96.2 °F (35.7 °C)-98 °F (36.7 °C)] 97.2 °F (36.2 °C)  Pulse:  [61-82] 82  Resp:  [16-24] 16  SpO2:  [94 %-98 %] 98 %  BP: (156-254)/() " 170/85     Weight: 81.6 kg (179 lb 14.3 oz)  Body mass index is 28.6 kg/m².  No intake or output data in the 24 hours ending 09/04/19 1620   Physical Exam   Constitutional: She is oriented to person, place, and time. She appears well-developed and well-nourished. No distress.   HENT:   Head: Normocephalic and atraumatic.   Eyes: Conjunctivae are normal. No scleral icterus.   Neck: Normal range of motion. Neck supple.   Cardiovascular: Normal rate, regular rhythm, normal heart sounds and intact distal pulses.   Pulmonary/Chest: Effort normal and breath sounds normal.   Abdominal: Soft. Bowel sounds are normal.   Musculoskeletal: She exhibits no edema.   Neurological: She is alert and oriented to person, place, and time.   Skin: Skin is warm and dry. She is not diaphoretic. No pallor.   Nursing note and vitals reviewed.      Significant Labs:   CBC:   Recent Labs   Lab 09/03/19 2058   WBC 5.16   HGB 14.3   HCT 43.7        CMP:   Recent Labs   Lab 09/03/19 2058      K 3.8      CO2 26   *   BUN 16   CREATININE 0.8   CALCIUM 9.1   PROT 7.2   ALBUMIN 3.8   BILITOT 0.5   ALKPHOS 54*   AST 22   ALT 18   ANIONGAP 10   EGFRNONAA >60     Troponin:   Recent Labs   Lab 09/03/19 2058 09/04/19 0239 09/04/19  0818   TROPONINI 0.053* 0.046* 0.051*     All pertinent labs within the past 24 hours have been reviewed.    Significant Imaging: I have reviewed all pertinent imaging results/findings within the past 24 hours.   Imaging Results          X-Ray Chest PA And Lateral (Final result)  Result time 09/04/19 00:43:08    Final result by Francheska Waggoner MD (09/04/19 00:43:08)                 Impression:      No acute intrathoracic abnormality.      Electronically signed by: Francheska Waggoner  Date:    09/04/2019  Time:    00:43             Narrative:    EXAMINATION:  CHEST PA AND LATERAL    CLINICAL HISTORY:  Chest pain, unspecified    TECHNIQUE:  PA and lateral chest radiograph    FINDINGS:  The cardiac  silhouette is within normal limits.   There is no focal consolidation, pneumothorax, or pleural effusion.  Mild linear plates of atelectasis are seen in the left mid lung field.  Dextroscoliosis is present.  There are spondylitic changes.                                    Assessment/Plan:      * Hypertension, uncontrolled  - she is experiencing very elevated BP's, likely 2/2 medication non-compliance   - reinitiate amlodipine  - no proteinuria or elevation of Creatinine  - d/w patient compliance with meds    Elevated troponin  - suspect 2/2 significantly elevated BP   - slightly elevated but flat with ECG showing T-wave inversions across the precordial leads could possibly represent myocardial ischemia d/w Cardio  - ECHO today and stress test in a.m.    VTE Risk Mitigation (From admission, onward)        Ordered     enoxaparin injection 40 mg  Daily      09/04/19 1140     Place sequential compression device  Until discontinued      09/04/19 0041     IP VTE HIGH RISK PATIENT  Once      09/04/19 0041                Sophie Jones PA-C  Department of Hospital Medicine   [facility]

## 2019-09-04 NOTE — PLAN OF CARE
Multiple attempts to reach pt listed PCP:  Dr. ROBERTA Waggoner [phone] unsuccessful, number not connected and Internet search does not produce contact information for this PCP.     New PCP established at with assigned PHN PCP: Dr. Laureano.  Information added to AVS.  Pt aware of importance and states she will go to this appointment.

## 2019-09-04 NOTE — H&P
"[facility] Medicine  History & Physical    Patient Name: Karis Briceño  MRN: [id number]  Admission Date: 9/3/2019  Attending Physician: Bill Tom MD   Primary Care Provider: Richa Waggoner MD         Patient information was obtained from patient, past medical records and ER records.     Subjective:     Principal Problem:Hypertension, uncontrolled    Chief Complaint:   Chief Complaint   Patient presents with    Fatigue     woke up this morning and went to work and was still not feeling well so went to urgent care and was sent here        HPI: Ms. Karis Briceño is a 79 y.o. female, with PMH of HTN (stopped amlodipine ~1 year ago, taking garlic instead), pancreatitis, who presented to [facility] ED on 9/3/19 2/2 fatigue and "not feeling like my usual bubbly self." She states she has felt tired, had a headache, and had eye redness. She went to Urgent Care, where her SBP was measured in the 250's. She was sent to the ED. She denies fever, chills, chest pain, SOB, nausea, dysuria. She is a non-smoker, but does drink 4 beers/week. She was evaluated in the ED, and found to have elevated BP, which improved after clonidine 0.2 mg. EKG showed T wave inversions in lateral leads, troponin was elevated. She was placed on OBS.     Past Medical History:   Diagnosis Date    Gallstones     Hypertension        Past Surgical History:   Procedure Laterality Date    HYSTERECTOMY         Review of patient's allergies indicates:  No Known Allergies    No current facility-administered medications on file prior to encounter.      Current Outpatient Medications on File Prior to Encounter   Medication Sig    amlodipine (NORVASC) 5 MG tablet Take 2 tablets (10 mg total) by mouth once daily.     Family History     None        Tobacco Use    Smoking status: Never Smoker   Substance and Sexual Activity    Alcohol use: Yes     Comment: socially    Drug use: No    Sexual activity: Not on file     Review of Systems "   Constitutional: Negative for chills, diaphoresis, fatigue and fever.   Respiratory: Negative for cough, shortness of breath and wheezing.    Cardiovascular: Positive for chest pain. Negative for palpitations and leg swelling.   Gastrointestinal: Negative for abdominal pain, diarrhea, nausea and vomiting.   Genitourinary: Negative for dysuria, flank pain, frequency, hematuria and urgency.   Musculoskeletal: Negative for arthralgias, back pain and neck stiffness.   Skin: Negative for color change and pallor.   Neurological: Positive for headaches. Negative for dizziness, syncope, weakness, light-headedness and numbness.   Psychiatric/Behavioral: Negative for confusion.     Objective:     Vital Signs (Most Recent):  Temp: 96.9 °F (36.1 °C) (09/04/19 0348)  Pulse: 63 (09/04/19 0348)  Resp: 16 (09/04/19 0348)  BP: (!) 200/93 (09/04/19 0348)  SpO2: (!) 94 % (09/04/19 0348) Vital Signs (24h Range):  Temp:  [96.9 °F (36.1 °C)-98 °F (36.7 °C)] 96.9 °F (36.1 °C)  Pulse:  [61-70] 63  Resp:  [16-24] 16  SpO2:  [94 %-98 %] 94 %  BP: (156-254)/() 200/93     Weight: 81.6 kg (179 lb 14.3 oz)  Body mass index is 28.6 kg/m².    Physical Exam   Constitutional: She is oriented to person, place, and time. She appears well-developed and well-nourished. No distress.   HENT:   Head: Normocephalic and atraumatic.   Eyes: Pupils are equal, round, and reactive to light. Conjunctivae and EOM are normal. No scleral icterus.   Neck: Normal range of motion. Neck supple. No tracheal deviation present.   Cardiovascular: Normal rate, regular rhythm, normal heart sounds and intact distal pulses. Exam reveals no gallop and no friction rub.   No murmur heard.  Pulmonary/Chest: Effort normal and breath sounds normal. No stridor. No respiratory distress. She has no wheezes. She has no rales.   Abdominal: Soft. Bowel sounds are normal. She exhibits no distension. There is no tenderness. There is no guarding.   Musculoskeletal: Normal range of  motion. She exhibits no deformity.   Neurological: She is alert and oriented to person, place, and time.   Skin: Skin is warm and dry. She is not diaphoretic. No pallor.   Psychiatric: She has a normal mood and affect. Her behavior is normal. Judgment and thought content normal.   Nursing note and vitals reviewed.        CRANIAL NERVES     CN III, IV, VI   Pupils are equal, round, and reactive to light.  Extraocular motions are normal.        Significant Labs:   BMP:   Recent Labs   Lab 09/03/19 2058   *      K 3.8      CO2 26   BUN 16   CREATININE 0.8   CALCIUM 9.1     CBC:   Recent Labs   Lab 09/03/19 2058   WBC 5.16   HGB 14.3   HCT 43.7        CMP:   Recent Labs   Lab 09/03/19 2058      K 3.8      CO2 26   *   BUN 16   CREATININE 0.8   CALCIUM 9.1   PROT 7.2   ALBUMIN 3.8   BILITOT 0.5   ALKPHOS 54*   AST 22   ALT 18   ANIONGAP 10   EGFRNONAA >60     Urine Culture: No results for input(s): LABURIN in the last 48 hours.  Urine Studies:   Recent Labs   Lab 09/03/19 2104   COLORU Yellow   APPEARANCEUA Clear   PHUR 8.0   SPECGRAV 1.010   PROTEINUA Negative   GLUCUA Negative   KETONESU Negative   BILIRUBINUA Negative   OCCULTUA Trace*   NITRITE Negative   UROBILINOGEN Negative   LEUKOCYTESUR Trace*   RBCUA 1   WBCUA 2   BACTERIA Rare     All pertinent labs within the past 24 hours have been reviewed.    Significant Imaging: I have reviewed all pertinent imaging results/findings within the past 24 hours.   Imaging Results          X-Ray Chest PA And Lateral (Final result)  Result time 09/04/19 00:43:08    Final result by Francheska Waggoner MD (09/04/19 00:43:08)                 Impression:      No acute intrathoracic abnormality.      Electronically signed by: Francheska Waggoner  Date:    09/04/2019  Time:    00:43             Narrative:    EXAMINATION:  CHEST PA AND LATERAL    CLINICAL HISTORY:  Chest pain, unspecified    TECHNIQUE:  PA and lateral chest  radiograph    FINDINGS:  The cardiac silhouette is within normal limits.   There is no focal consolidation, pneumothorax, or pleural effusion.  Mild linear plates of atelectasis are seen in the left mid lung field.  Dextroscoliosis is present.  There are spondylitic changes.                                 Assessment/Plan:     * Hypertension, uncontrolled  - Ms. Karis Briceño is placed on OBS  - she is experiencing very elevated BP's, likely 2/2 medication non-compliance   - reinitiate amlodipine  - BP improved after clonidine  - PRN hydralazine to aim for a MAP of ~120 (for a 30% reduction overnight to avoid hypoperfusion)   - no proteinuria or elevation of Creatinine  - monitor     Elevated troponin  - suspect 2/2 significantly elevated BP   - trend w/ EKGs as initial EKG showed lateral T wave inversions   - cardiology consulted     VTE Risk Mitigation (From admission, onward)        Ordered     heparin (porcine) injection 5,000 Units  Every 8 hours      09/04/19 0041     Place sequential compression device  Until discontinued      09/04/19 0041     IP VTE HIGH RISK PATIENT  Once      09/04/19 0041             Susy Lozano PA-C  Department of Hospital Medicine   [facility]

## 2019-09-04 NOTE — ASSESSMENT & PLAN NOTE
- Ms. Karis Briceño is placed on OBS  - she is experiencing very elevated BP's, likely 2/2 medication non-compliance   - reinitiate amlodipine  - BP improved after clonidine  - PRN hydralazine to aim for a MAP of ~120 (for a 30% reduction overnight to avoid hypoperfusion)   - no proteinuria or elevation of Creatinine  - monitor

## 2019-09-04 NOTE — ASSESSMENT & PLAN NOTE
- suspect 2/2 significantly elevated BP   - slightly elevated but flat with ECG showing T-wave inversions across the precordial leads could possibly represent myocardial ischemia d/w Cardio  - ECHO today and stress test in a.m.

## 2019-09-04 NOTE — HPI
"Ms. Karis Briceño is a 79 y.o. female, with PMH of HTN (stopped amlodipine ~1 year ago, taking garlic instead), pancreatitis, who presented to [facility] ED on 9/3/19 2/2 fatigue and "not feeling like my usual bubbly self." She states she has felt tired, had a headache, and had eye redness. She went to Urgent Care, where her SBP was measured in the 250's. She was sent to the ED. She denies fever, chills, chest pain, SOB, nausea, dysuria. She is a non-smoker, but does drink 4 beers/week. She was evaluated in the ED, and found to have elevated BP, which improved after clonidine 0.2 mg. EKG showed T wave inversions in lateral leads, troponin was elevated. She was placed on OBS.   "

## 2019-09-04 NOTE — PLAN OF CARE
Problem: Adult Inpatient Plan of Care  Goal: Plan of Care Review  Outcome: Ongoing (interventions implemented as appropriate)  Pt in bed, no noted acute distress, no complaints of pain, BP trending high at 189/88 @ 1600, PRN hydralazine administered, BP rechecked after 30 min and found to be 178/84, moonlighter contacted,  bed in low locked position, call light in reach, hourly rounds complete, will continue to assess

## 2019-09-04 NOTE — ASSESSMENT & PLAN NOTE
- she is experiencing very elevated BP's, likely 2/2 medication non-compliance   - reinitiate amlodipine  - no proteinuria or elevation of Creatinine  - d/w patient compliance with meds

## 2019-09-04 NOTE — PLAN OF CARE
"Met with patient at bedside to complete discharge planning assessment. Patient has an appt to establish care with Dr Bubba Laureano in 2 weeks. Pharmacy of choice for this admission is [facility]. Patient's family will provide transportation home. 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 Yes   Assessment information obtained from? Patient   Expected Length of Stay (days) 0   Communicated expected length of stay with patient/caregiver yes   Prior to hospitilization cognitive status: Alert/Oriented   Prior to hospitalization functional status: Independent   Current cognitive status: Alert/Oriented   Current Functional Status: Independent   Lives With alone   Able to Return to Prior Arrangements yes   Is patient able to care for self after discharge? Yes   Patient's perception of discharge disposition home or selfcare   Readmission Within the Last 30 Days no previous admission in last 30 days   Patient currently being followed by outpatient case management? No   Patient currently receives any other outside agency services? No   Equipment Currently Used at Home cane, straight   Do you have any problems affording any of your prescribed medications? No   Is the patient taking medications as prescribed? yes   Does the patient have transportation home? Yes   Transportation Anticipated family or friend will provide   Discharge Plan A Home   DME Needed Upon Discharge  none   Patient/Family in Agreement with Plan yes     "

## 2019-09-04 NOTE — SUBJECTIVE & OBJECTIVE
Past Medical History:   Diagnosis Date    Gallstones     Hypertension        Past Surgical History:   Procedure Laterality Date    HYSTERECTOMY         Review of patient's allergies indicates:  No Known Allergies    No current facility-administered medications on file prior to encounter.      Current Outpatient Medications on File Prior to Encounter   Medication Sig    amlodipine (NORVASC) 5 MG tablet Take 2 tablets (10 mg total) by mouth once daily.     Family History     None        Tobacco Use    Smoking status: Never Smoker   Substance and Sexual Activity    Alcohol use: Yes     Comment: socially    Drug use: No    Sexual activity: Not on file     Review of Systems   Constitutional: Negative for chills, diaphoresis, fatigue and fever.   Respiratory: Negative for cough, shortness of breath and wheezing.    Cardiovascular: Positive for chest pain. Negative for palpitations and leg swelling.   Gastrointestinal: Negative for abdominal pain, diarrhea, nausea and vomiting.   Genitourinary: Negative for dysuria, flank pain, frequency, hematuria and urgency.   Musculoskeletal: Negative for arthralgias, back pain and neck stiffness.   Skin: Negative for color change and pallor.   Neurological: Positive for headaches. Negative for dizziness, syncope, weakness, light-headedness and numbness.   Psychiatric/Behavioral: Negative for confusion.     Objective:     Vital Signs (Most Recent):  Temp: 96.9 °F (36.1 °C) (09/04/19 0348)  Pulse: 63 (09/04/19 0348)  Resp: 16 (09/04/19 0348)  BP: (!) 200/93 (09/04/19 0348)  SpO2: (!) 94 % (09/04/19 0348) Vital Signs (24h Range):  Temp:  [96.9 °F (36.1 °C)-98 °F (36.7 °C)] 96.9 °F (36.1 °C)  Pulse:  [61-70] 63  Resp:  [16-24] 16  SpO2:  [94 %-98 %] 94 %  BP: (156-254)/() 200/93     Weight: 81.6 kg (179 lb 14.3 oz)  Body mass index is 28.6 kg/m².    Physical Exam   Constitutional: She is oriented to person, place, and time. She appears well-developed and well-nourished. No  distress.   HENT:   Head: Normocephalic and atraumatic.   Eyes: Pupils are equal, round, and reactive to light. Conjunctivae and EOM are normal. No scleral icterus.   Neck: Normal range of motion. Neck supple. No tracheal deviation present.   Cardiovascular: Normal rate, regular rhythm, normal heart sounds and intact distal pulses. Exam reveals no gallop and no friction rub.   No murmur heard.  Pulmonary/Chest: Effort normal and breath sounds normal. No stridor. No respiratory distress. She has no wheezes. She has no rales.   Abdominal: Soft. Bowel sounds are normal. She exhibits no distension. There is no tenderness. There is no guarding.   Musculoskeletal: Normal range of motion. She exhibits no deformity.   Neurological: She is alert and oriented to person, place, and time.   Skin: Skin is warm and dry. She is not diaphoretic. No pallor.   Psychiatric: She has a normal mood and affect. Her behavior is normal. Judgment and thought content normal.   Nursing note and vitals reviewed.        CRANIAL NERVES     CN III, IV, VI   Pupils are equal, round, and reactive to light.  Extraocular motions are normal.        Significant Labs:   BMP:   Recent Labs   Lab 09/03/19 2058   *      K 3.8      CO2 26   BUN 16   CREATININE 0.8   CALCIUM 9.1     CBC:   Recent Labs   Lab 09/03/19 2058   WBC 5.16   HGB 14.3   HCT 43.7        CMP:   Recent Labs   Lab 09/03/19 2058      K 3.8      CO2 26   *   BUN 16   CREATININE 0.8   CALCIUM 9.1   PROT 7.2   ALBUMIN 3.8   BILITOT 0.5   ALKPHOS 54*   AST 22   ALT 18   ANIONGAP 10   EGFRNONAA >60     Urine Culture: No results for input(s): LABURIN in the last 48 hours.  Urine Studies:   Recent Labs   Lab 09/03/19 2104   COLORU Yellow   APPEARANCEUA Clear   PHUR 8.0   SPECGRAV 1.010   PROTEINUA Negative   GLUCUA Negative   KETONESU Negative   BILIRUBINUA Negative   OCCULTUA Trace*   NITRITE Negative   UROBILINOGEN Negative   LEUKOCYTESUR Trace*    RBCUA 1   WBCUA 2   BACTERIA Rare     All pertinent labs within the past 24 hours have been reviewed.    Significant Imaging: I have reviewed all pertinent imaging results/findings within the past 24 hours.   Imaging Results          X-Ray Chest PA And Lateral (Final result)  Result time 09/04/19 00:43:08    Final result by Francheska Waggoner MD (09/04/19 00:43:08)                 Impression:      No acute intrathoracic abnormality.      Electronically signed by: Francheska Waggoner  Date:    09/04/2019  Time:    00:43             Narrative:    EXAMINATION:  CHEST PA AND LATERAL    CLINICAL HISTORY:  Chest pain, unspecified    TECHNIQUE:  PA and lateral chest radiograph    FINDINGS:  The cardiac silhouette is within normal limits.   There is no focal consolidation, pneumothorax, or pleural effusion.  Mild linear plates of atelectasis are seen in the left mid lung field.  Dextroscoliosis is present.  There are spondylitic changes.

## 2019-09-04 NOTE — PHARMACY MED REC
"Admission Medication Reconciliation - Pharmacy Consult Note    The home medication history was taken by Monica Gaming CPhT.  Based on information gathered and subsequent review by the clinical pharmacist, the items below may need attention.    You may go to "Admission" then "Reconcile Home Medications" tabs to review and/or act upon these items.    No issues noted with the medication reconciliation.    Medications Prior to Admission   Medication Sig Dispense Refill Last Dose    fish oil-omega-3 fatty acids 300-1,000 mg capsule Take 1 capsule by mouth once daily.       [DISCONTINUED] amlodipine (NORVASC) 5 MG tablet Take 2 tablets (10 mg total) by mouth once daily. 30 tablet 0        Please address this information as you see fit.  Feel free to contact us if you have any questions or require assistance.    Malick Dhillon, Pharm.D., BCPS  [phone]                .  .          "

## 2019-09-04 NOTE — ED TRIAGE NOTES
"Pt presents to ED after being seen at urgent care for "hypertensive crisis". Pt reports not feeling like herself earlier today and states she was a little lightheaded. States she is feeling fine now. Denies headache, N/V/D, chest pain, SOB, blurred vision, fever, and dizziness   "

## 2019-09-04 NOTE — PLAN OF CARE
Problem: Adult Inpatient Plan of Care  Goal: Plan of Care Review  Outcome: Ongoing (interventions implemented as appropriate)  POC reviewed with patient. Patient free from falls or injury throughout shift. Heparin refused, SCDs applied. All safety measures in place. Family to remain at bedside. Will continue to monitor.

## 2019-09-05 ENCOUNTER — HOSPITAL ENCOUNTER (OUTPATIENT)
Dept: CARDIOLOGY | Facility: OTHER | Age: 79
Discharge: HOME | End: 2019-09-05
Attending: HOSPITALIST
Payer: MEDICARE

## 2019-09-05 VITALS
HEIGHT: 57 IN | DIASTOLIC BLOOD PRESSURE: 95 MMHG | WEIGHT: 179 LBS | HEART RATE: 77 BPM | SYSTOLIC BLOOD PRESSURE: 206 MMHG | BODY MASS INDEX: 38.62 KG/M2

## 2019-09-05 VITALS
WEIGHT: 179.88 LBS | DIASTOLIC BLOOD PRESSURE: 81 MMHG | HEIGHT: 67 IN | HEART RATE: 75 BPM | TEMPERATURE: 98 F | SYSTOLIC BLOOD PRESSURE: 176 MMHG | RESPIRATION RATE: 18 BRPM | BODY MASS INDEX: 28.23 KG/M2 | OXYGEN SATURATION: 96 %

## 2019-09-05 LAB
AORTIC ROOT ANNULUS: 3.04 CM
AORTIC VALVE CUSP SEPERATION: 1.99 CM
ASCENDING AORTA: 2.93 CM
AV INDEX (PROSTH): 0.71
AV MEAN GRADIENT: 8 MMHG
AV PEAK GRADIENT: 16 MMHG
AV VALVE AREA: 2.32 CM2
AV VELOCITY RATIO: 0.53
BSA FOR ECHO PROCEDURE: 1.79 M2
CV ECHO LV RWT: 0.39 CM
CV PHARM DOSE: 0.4 MG
CV STRESS BASE HR: 77 BPM
DIASTOLIC BLOOD PRESSURE: 95 MMHG
DOP CALC AO PEAK VEL: 1.98 M/S
DOP CALC AO VTI: 30.92 CM
DOP CALC LVOT AREA: 3.3 CM2
DOP CALC LVOT DIAMETER: 2.04 CM
DOP CALC LVOT PEAK VEL: 1.04 M/S
DOP CALC LVOT STROKE VOLUME: 71.84 CM3
DOP CALCLVOT PEAK VEL VTI: 21.99 CM
E WAVE DECELERATION TIME: 203.41 MSEC
E/A RATIO: 0.65
E/E' RATIO: 7.07 M/S
ECHO LV POSTERIOR WALL: 1.07 CM (ref 0.6–1.1)
FRACTIONAL SHORTENING: 29 % (ref 28–44)
INTERVENTRICULAR SEPTUM: 0.96 CM (ref 0.6–1.1)
LA MAJOR: 5.85 CM
LA MINOR: 5.61 CM
LA WIDTH: 4.08 CM
LEFT ATRIUM SIZE: 4.09 CM
LEFT ATRIUM VOLUME INDEX: 42.3 ML/M2
LEFT ATRIUM VOLUME: 81.24 CM3
LEFT INTERNAL DIMENSION IN SYSTOLE: 3.93 CM (ref 2.1–4)
LEFT VENTRICLE DIASTOLIC VOLUME INDEX: 76.6 ML/M2
LEFT VENTRICLE DIASTOLIC VOLUME: 147.26 ML
LEFT VENTRICLE MASS INDEX: 113 G/M2
LEFT VENTRICLE SYSTOLIC VOLUME INDEX: 34.9 ML/M2
LEFT VENTRICLE SYSTOLIC VOLUME: 67.09 ML
LEFT VENTRICULAR INTERNAL DIMENSION IN DIASTOLE: 5.5 CM (ref 3.5–6)
LEFT VENTRICULAR MASS: 217.4 G
LV LATERAL E/E' RATIO: 5.3 M/S
LV SEPTAL E/E' RATIO: 10.6 M/S
MV PEAK A VEL: 0.82 M/S
MV PEAK E VEL: 0.53 M/S
OHS CV CPX 85 PERCENT MAX PREDICTED HEART RATE MALE: 116
OHS CV CPX MAX PREDICTED HEART RATE: 136
OHS CV CPX PATIENT IS FEMALE: 1
OHS CV CPX PATIENT IS MALE: 0
OHS CV CPX PEAK DIASTOLIC BLOOD PRESSURE: 101 MMHG
OHS CV CPX PEAK HEAR RATE: 98 BPM
OHS CV CPX PEAK RATE PRESSURE PRODUCT: NORMAL
OHS CV CPX PEAK SYSTOLIC BLOOD PRESSURE: 212 MMHG
OHS CV CPX PERCENT MAX PREDICTED HEART RATE ACHIEVED: 72
OHS CV CPX RATE PRESSURE PRODUCT PRESENTING: NORMAL
PISA TR MAX VEL: 2.5 M/S
RA MAJOR: 4.82 CM
RA WIDTH: 3.41 CM
RIGHT VENTRICULAR END-DIASTOLIC DIMENSION: 2.37 CM
SINUS: 2.76 CM
STJ: 2.43 CM
STRESS ECHO TARGET HR: 119.85 BPM
SYSTOLIC BLOOD PRESSURE: 206 MMHG
TDI LATERAL: 0.1 M/S
TDI SEPTAL: 0.05 M/S
TDI: 0.08 M/S
TR MAX PG: 25 MMHG

## 2019-09-05 PROCEDURE — G0378 HOSPITAL OBSERVATION PER HR: HCPCS

## 2019-09-05 PROCEDURE — 93017 CV STRESS TEST TRACING ONLY: CPT

## 2019-09-05 PROCEDURE — 93016 TREADMILL STRESS TEST (CUPID ONLY): ICD-10-PCS | Mod: ,,, | Performed by: INTERNAL MEDICINE

## 2019-09-05 PROCEDURE — 99217 PR OBSERVATION CARE DISCHARGE: CPT | Mod: ,,, | Performed by: PHYSICIAN ASSISTANT

## 2019-09-05 PROCEDURE — 93018 TREADMILL STRESS TEST (CUPID ONLY): ICD-10-PCS | Mod: ,,, | Performed by: INTERNAL MEDICINE

## 2019-09-05 PROCEDURE — 94761 N-INVAS EAR/PLS OXIMETRY MLT: CPT

## 2019-09-05 PROCEDURE — 93016 CV STRESS TEST SUPVJ ONLY: CPT | Mod: ,,, | Performed by: INTERNAL MEDICINE

## 2019-09-05 PROCEDURE — 99217 PR OBSERVATION CARE DISCHARGE: ICD-10-PCS | Mod: ,,, | Performed by: PHYSICIAN ASSISTANT

## 2019-09-05 PROCEDURE — 25000003 PHARM REV CODE 250: Performed by: PHYSICIAN ASSISTANT

## 2019-09-05 PROCEDURE — 93018 CV STRESS TEST I&R ONLY: CPT | Mod: ,,, | Performed by: INTERNAL MEDICINE

## 2019-09-05 RX ORDER — HYDRALAZINE HYDROCHLORIDE 25 MG/1
25 TABLET, FILM COATED ORAL EVERY 8 HOURS PRN
Status: DISCONTINUED | OUTPATIENT
Start: 2019-09-05 | End: 2019-09-05 | Stop reason: HOSPADM

## 2019-09-05 RX ADMIN — HYDROCHLOROTHIAZIDE 12.5 MG: 12.5 TABLET ORAL at 08:09

## 2019-09-05 RX ADMIN — HYDRALAZINE HYDROCHLORIDE 25 MG: 25 TABLET, FILM COATED ORAL at 03:09

## 2019-09-05 RX ADMIN — AMLODIPINE BESYLATE 10 MG: 5 TABLET ORAL at 08:09

## 2019-09-05 NOTE — DISCHARGE SUMMARY
"[facility] Hospital Medicine  Discharge Summary      Patient Name: Karis Briceño  MRN: [id number]  Admission Date: 9/3/2019  Hospital Length of Stay: 0 days  Discharge Date and Time: 9/5/2019  6:44 PM  Attending Physician: No att. providers found   Discharging Provider: Sophie Jones PA-C  Primary Care Provider: No primary care provider on file.      HPI:   Ms. Karis Briceño is a 79 y.o. female, with PMH of HTN (stopped amlodipine ~1 year ago, taking garlic instead), pancreatitis, who presented to [facility] ED on 9/3/19 2/2 fatigue and "not feeling like my usual bubbly self." She states she has felt tired, had a headache, and had eye redness. She went to Urgent Care, where her SBP was measured in the 250's. She was sent to the ED. She denies fever, chills, chest pain, SOB, nausea, dysuria. She is a non-smoker, but does drink 4 beers/week. She was evaluated in the ED, and found to have elevated BP, which improved after clonidine 0.2 mg. EKG showed T wave inversions in lateral leads, troponin was elevated. She was placed on OBS.     * No surgery found *      Hospital Course:   80 y/o female admitted to observation with symptomatic uncontrolled hypertension, placed back on her home medication, amlodipine 10 mg; troponin elevation likely a type 2 mechanism from uncontrolled hypertension. She underwent stress test that was scintigraphically negative for ischemia or infarct; global left ventricular systolic function normal with an LV ejection fraction of 76 % and no evidence of LV dilatation. Wall motion normal. HCTZ 12.5 mg added to patient's BP management. Instructed to follow with new PCP as scheduled and encourage enrollment to BP monitoring program. All questions answered. Patient counseled on compliance with medications. Vitals stable, discharge home.      Consults:   Consults (From admission, onward)        Status Ordering Provider     Inpatient consult to Cardiology  Once     Provider:  Baltazar" MD Coco    Completed CED PETERSEN          No new Assessment & Plan notes have been filed under this hospital service since the last note was generated.  Service: Hospital Medicine    Final Active Diagnoses:    Diagnosis Date Noted POA    PRINCIPAL PROBLEM:  Hypertension, uncontrolled [I10] 09/03/2019 Yes    Elevated troponin [R74.8] 09/03/2019 Yes      Problems Resolved During this Admission:       Discharged Condition: stable    Disposition: Home or Self Care    Follow Up:  Follow-up Information     Vandana Smith MD In 1 week.    Specialty:  Internal Medicine  Why:  See new PCP in 1 week. 9-13-19 at 9:40 A  Contact information:  [address] [facility]  [address]  [facility] [address]  [phone]             Baltazar Sepulveda MD In 2 weeks.    Specialty:  Cardiology  Contact information:  [address] [facility] [address]  [phone]                 Patient Instructions:      Diet Adult Regular     Order Specific Question Answer Comments   Na restriction, if any: 2gNa      Notify your health care provider if you experience any of the following:  temperature >100.4     Notify your health care provider if you experience any of the following:  persistent nausea and vomiting or diarrhea     Notify your health care provider if you experience any of the following:  severe uncontrolled pain     Notify your health care provider if you experience any of the following:  difficulty breathing or increased cough     Notify your health care provider if you experience any of the following:  severe persistent headache     Notify your health care provider if you experience any of the following:  persistent dizziness, light-headedness, or visual disturbances     Activity as tolerated       Significant Diagnostic Studies: Labs:   CMP  Sodium   Date Value Ref Range Status   09/03/2019 140 136 - 145 mmol/L Final     Potassium   Date Value Ref Range Status   09/03/2019 3.8 3.5 - 5.1 mmol/L Final     Chloride   Date  Value Ref Range Status   09/03/2019 104 95 - 110 mmol/L Final     CO2   Date Value Ref Range Status   09/03/2019 26 23 - 29 mmol/L Final     Glucose   Date Value Ref Range Status   09/03/2019 117 (H) 70 - 110 mg/dL Final     BUN, Bld   Date Value Ref Range Status   09/03/2019 16 8 - 23 mg/dL Final     Creatinine   Date Value Ref Range Status   09/03/2019 0.8 0.5 - 1.4 mg/dL Final     Calcium   Date Value Ref Range Status   09/03/2019 9.1 8.7 - 10.5 mg/dL Final     Total Protein   Date Value Ref Range Status   09/03/2019 7.2 6.0 - 8.4 g/dL Final     Albumin   Date Value Ref Range Status   09/03/2019 3.8 3.5 - 5.2 g/dL Final     Total Bilirubin   Date Value Ref Range Status   09/03/2019 0.5 0.1 - 1.0 mg/dL Final     Comment:     For infants and newborns, interpretation of results should be based  on gestational age, weight and in agreement with clinical  observations.  Premature Infant recommended reference ranges:  Up to 24 hours.............<8.0 mg/dL  Up to 48 hours............<12.0 mg/dL  3-5 days..................<15.0 mg/dL  6-29 days.................<15.0 mg/dL       Alkaline Phosphatase   Date Value Ref Range Status   09/03/2019 54 (L) 55 - 135 U/L Final     AST   Date Value Ref Range Status   09/03/2019 22 10 - 40 U/L Final     ALT   Date Value Ref Range Status   09/03/2019 18 10 - 44 U/L Final     Anion Gap   Date Value Ref Range Status   09/03/2019 10 8 - 16 mmol/L Final     eGFR if    Date Value Ref Range Status   09/03/2019 >60 >60 mL/min/1.73 m^2 Final     eGFR if non    Date Value Ref Range Status   09/03/2019 >60 >60 mL/min/1.73 m^2 Final     Comment:     Calculation used to obtain the estimated glomerular filtration  rate (eGFR) is the CKD-EPI equation.        and All labs within the past 24 hours have been reviewed  Radiology:   Imaging Results          X-Ray Chest PA And Lateral (Final result)  Result time 09/04/19 00:43:08    Final result by Francheska Waggoner MD  (09/04/19 00:43:08)                 Impression:      No acute intrathoracic abnormality.      Electronically signed by: Francheska Waggoner  Date:    09/04/2019  Time:    00:43             Narrative:    EXAMINATION:  CHEST PA AND LATERAL    CLINICAL HISTORY:  Chest pain, unspecified    TECHNIQUE:  PA and lateral chest radiograph    FINDINGS:  The cardiac silhouette is within normal limits.   There is no focal consolidation, pneumothorax, or pleural effusion.  Mild linear plates of atelectasis are seen in the left mid lung field.  Dextroscoliosis is present.  There are spondylitic changes.                                  Pending Diagnostic Studies:     None         Medications:  Reconciled Home Medications:      Medication List      START taking these medications    hydroCHLOROthiazide 12.5 MG Tab  Commonly known as:  HYDRODIURIL  Take 1 tablet (12.5 mg total) by mouth once daily.        CHANGE how you take these medications    amLODIPine 10 MG tablet  Commonly known as:  NORVASC  Take 1 tablet (10 mg total) by mouth once daily.  What changed:  medication strength        ASK your doctor about these medications    fish oil-omega-3 fatty acids 300-1,000 mg capsule  Take 1 capsule by mouth once daily.            Indwelling Lines/Drains at time of discharge:   Lines/Drains/Airways          None          Time spent on the discharge of patient: >30 minutes  Patient was seen and examined on the date of discharge and determined to be suitable for discharge.         Sophie Jones PA-C  Department of Hospital Medicine  [facility]

## 2019-09-05 NOTE — NURSING
Discharge orders noted, discharge paperwork/instructions given to patient.  An emphasis was placed on the importance of taking her b/p medication  as ordered to prevent further occurences of  Uncontrolled hypertension.

## 2019-09-05 NOTE — PLAN OF CARE
D/c Plan;  Discharge planning discussed  See PCP as scheduled   See [facility] ([phone]) as discussed       09/05/19 5806   Final Note   Assessment Type Final Discharge Note   Anticipated Discharge Disposition Home   Hospital Follow Up  Appt(s) scheduled? Yes   Discharge plans and expectations educations in teach back method with documentation complete? Yes   Right Care Referral Info   Post Acute Recommendation No Care   Referral Type see digital medicine as discussed before discharge

## 2019-09-06 NOTE — HOSPITAL COURSE
80 y/o female admitted to observation with symptomatic uncontrolled hypertension, placed back on her home medication, amlodipine 10 mg; troponin elevation likely a type 2 mechanism from uncontrolled hypertension. She underwent stress test that was scintigraphically negative for ischemia or infarct; global left ventricular systolic function normal with an LV ejection fraction of 76 % and no evidence of LV dilatation. Wall motion normal. HCTZ 12.5 mg added to patient's BP management. Instructed to follow with new PCP as scheduled and encourage enrollment to BP monitoring program. All questions answered. Patient counseled on compliance with medications. Vitals stable, discharge home.

## 2019-09-11 ENCOUNTER — OFFICE VISIT (OUTPATIENT)
Dept: INTERNAL MEDICINE | Facility: CLINIC | Age: 79
End: 2019-09-11
Payer: MEDICARE

## 2019-09-11 ENCOUNTER — NURSE TRIAGE (OUTPATIENT)
Dept: ADMINISTRATIVE | Facility: CLINIC | Age: 79
End: 2019-09-11

## 2019-09-11 VITALS
SYSTOLIC BLOOD PRESSURE: 160 MMHG | HEART RATE: 86 BPM | WEIGHT: 178.56 LBS | HEIGHT: 57 IN | OXYGEN SATURATION: 96 % | DIASTOLIC BLOOD PRESSURE: 80 MMHG | BODY MASS INDEX: 38.52 KG/M2

## 2019-09-11 DIAGNOSIS — K80.81 BILIARY CALCULUS OF OTHER SITE WITH OBSTRUCTION: ICD-10-CM

## 2019-09-11 DIAGNOSIS — I10 ESSENTIAL HYPERTENSION: Primary | ICD-10-CM

## 2019-09-11 DIAGNOSIS — Z13.1 ENCOUNTER FOR SCREENING FOR DIABETES MELLITUS: ICD-10-CM

## 2019-09-11 DIAGNOSIS — Z13.6 ENCOUNTER FOR LIPID SCREENING FOR CARDIOVASCULAR DISEASE: ICD-10-CM

## 2019-09-11 DIAGNOSIS — Z28.21 REFUSED INFLUENZA VACCINE: ICD-10-CM

## 2019-09-11 DIAGNOSIS — E66.01 SEVERE OBESITY (BMI 35.0-35.9 WITH COMORBIDITY): ICD-10-CM

## 2019-09-11 DIAGNOSIS — Z13.220 ENCOUNTER FOR LIPID SCREENING FOR CARDIOVASCULAR DISEASE: ICD-10-CM

## 2019-09-11 PROBLEM — R79.89 ELEVATED TROPONIN: Status: RESOLVED | Noted: 2019-09-03 | Resolved: 2019-09-11

## 2019-09-11 PROCEDURE — 1101F PT FALLS ASSESS-DOCD LE1/YR: CPT | Mod: CPTII,S$GLB,, | Performed by: INTERNAL MEDICINE

## 2019-09-11 PROCEDURE — 3077F PR MOST RECENT SYSTOLIC BLOOD PRESSURE >= 140 MM HG: ICD-10-PCS | Mod: CPTII,S$GLB,, | Performed by: INTERNAL MEDICINE

## 2019-09-11 PROCEDURE — 99499 RISK ADDL DX/OHS AUDIT: ICD-10-PCS | Mod: S$GLB,,, | Performed by: INTERNAL MEDICINE

## 2019-09-11 PROCEDURE — 1101F PR PT FALLS ASSESS DOC 0-1 FALLS W/OUT INJ PAST YR: ICD-10-PCS | Mod: CPTII,S$GLB,, | Performed by: INTERNAL MEDICINE

## 2019-09-11 PROCEDURE — 3077F SYST BP >= 140 MM HG: CPT | Mod: CPTII,S$GLB,, | Performed by: INTERNAL MEDICINE

## 2019-09-11 PROCEDURE — 99205 PR OFFICE/OUTPT VISIT, NEW, LEVL V, 60-74 MIN: ICD-10-PCS | Mod: S$GLB,,, | Performed by: INTERNAL MEDICINE

## 2019-09-11 PROCEDURE — 99205 OFFICE O/P NEW HI 60 MIN: CPT | Mod: S$GLB,,, | Performed by: INTERNAL MEDICINE

## 2019-09-11 PROCEDURE — 99999 PR PBB SHADOW E&M-EST. PATIENT-LVL III: ICD-10-PCS | Mod: PBBFAC,,, | Performed by: INTERNAL MEDICINE

## 2019-09-11 PROCEDURE — 99999 PR PBB SHADOW E&M-EST. PATIENT-LVL III: CPT | Mod: PBBFAC,,, | Performed by: INTERNAL MEDICINE

## 2019-09-11 PROCEDURE — 3079F DIAST BP 80-89 MM HG: CPT | Mod: CPTII,S$GLB,, | Performed by: INTERNAL MEDICINE

## 2019-09-11 PROCEDURE — 3079F PR MOST RECENT DIASTOLIC BLOOD PRESSURE 80-89 MM HG: ICD-10-PCS | Mod: CPTII,S$GLB,, | Performed by: INTERNAL MEDICINE

## 2019-09-11 PROCEDURE — 99499 UNLISTED E&M SERVICE: CPT | Mod: S$GLB,,, | Performed by: INTERNAL MEDICINE

## 2019-09-11 RX ORDER — AMLODIPINE BESYLATE 10 MG/1
10 TABLET ORAL DAILY
Qty: 90 TABLET | Refills: 1 | Status: SHIPPED | OUTPATIENT
Start: 2019-09-11 | End: 2019-09-30 | Stop reason: SDUPTHER

## 2019-09-11 RX ORDER — HYDROCHLOROTHIAZIDE 25 MG/1
25 TABLET ORAL DAILY
Qty: 90 TABLET | Refills: 1 | Status: SHIPPED | OUTPATIENT
Start: 2019-09-11 | End: 2019-12-11 | Stop reason: SDUPTHER

## 2019-09-11 RX ORDER — HYDROCHLOROTHIAZIDE 25 MG/1
25 TABLET ORAL DAILY
Qty: 90 TABLET | Refills: 1 | Status: CANCELLED | OUTPATIENT
Start: 2019-09-11 | End: 2020-09-10

## 2019-09-11 NOTE — PROGRESS NOTES
Patient, Karis Briceño (MRN #[id number]), presented with a recorded BMI of 38.64 kg/m^2 and a documented comorbidity(s):  - Hypertension  to which the severe obesity is a contributing factor. This is consistent with the definition of severe obesity (BMI 35.0-39.9) with comorbidity (ICD-10 E66.01, Z68.35). The patient's severe obesity was monitored, evaluated, addressed and/or treated. This addendum to the medical record is made on 09/11/2019.

## 2019-09-11 NOTE — TELEPHONE ENCOUNTER
Reason for Disposition   Systolic BP >= 180 OR Diastolic >= 110    Additional Information   Negative: Sounds like a life-threatening emergency to the triager   Negative: Pregnant > 20 weeks and new hand or face swelling   Negative: Pregnant > 20 weeks and BP > 140/90   Negative: Systolic BP >= 160 OR Diastolic >= 100, and any cardiac or neurologic symptoms (e.g., chest pain, difficulty breathing, unsteady gait, blurred vision)   Negative: Patient sounds very sick or weak to the triager   Negative: BP Systolic BP >= 140 OR Diastolic >= 90 and postpartum < 4 weeks   Negative: Systolic BP >= 180 OR Diastolic >= 110, and missed most recent dose of blood pressure medication    Protocols used: HIGH BLOOD PRESSURE-A-OH    Pt called stated her blood pressure has been elevated. Pt /100. Pt blood pressure has been between 172/00 197/105. Pt denies any symptoms. Care advice recommended pt see Md today. Pt will be seeing Dr Smith today at 1:40 pm.

## 2019-09-11 NOTE — LETTER
September 11, 2019      [facility] [place] [address] [facility] [address]  [address]  [facility] [address]  Phone: [phone]  Fax: [phone]       Patient: Karis Briceño   YOB: 1940  Date of Visit: 09/11/2019    To Whom It May Concern:    Anthony Briceño  was at [facility] on 09/11/2019. She may return to work/school on 09/12/2019 with no restrictions. If you have any questions or concerns, or if I can be of further assistance, please do not hesitate to contact me.    Sincerely,    Taylor Mccloud MA

## 2019-09-11 NOTE — PROGRESS NOTES
Subjective:       Patient ID: Karis Briceño is a 79 y.o. 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    Chief Complaint: Hospital Follow Up and Hypertension     History was obtained from the patient and supplemented through chart review.  The patient has not seen a PCP in our system.  Reviewed admission for HTN, ACs r/o.    HPI    HTN:    Pt's BP is not well controlled on Norvasc 10, HCTZ 12.5.  Was started on these meds after her discharge last week.  +compliant.  History of med noncompliance and was taking garlic instead.     Reports that she was aggravated at school.  They took her BP and it was 202/100, 172/100, 197/105 and was sent here.  Pt denies CP, SOB, lightheadedness, dizziness.  Denies HA, visual changes, weakness.  No leg cramps.    Presented to the ED last week due to fatigue, headache.  BP was uncontrolled in the 250s.  EKG with T-wave inversions in lateral leads.  Positive troponin.  No ANDREA.  CXR wnl.  Stress test was negative for ischemia or infarct.  TTE with normal EF, no WMA.  Added HCTZ 12.5 mg     FHx: unsure  Tobacco:  None  ETOH:  4 beers a week, wine, used to drink Crown. Maybe 2 beers at a time.  Exercise: walk daily, 2-3 blocks.  Used to do marathons, CCC.  Diet:  Eats Kale, veggies, fruits.  No steak in 4 months.  Avoids fried food.    Gallstone:  H/o gallstone pancreatitis 2016.  Did not have choly and improved her diet, avoid fried food.  ETOH as above.  Last attack last year.      Obesity:  BMI 38.  Improved diet.  Exercise as above.    HLD:  Is currently taking Omega 3 fish oil.  Previous abdominal ultrasound ordered for cholelithiasis suggesting hepatic steatosis.  No results found for: LDLCALC  The ASCVD Risk score (Jacques GERSON Jr., et al., 2013) failed to calculate for the following reasons:    Cannot find a previous HDL lab    Cannot find a previous total cholesterol lab    Review of Systems   Constitutional: Negative for  fever and unexpected weight change.   HENT: Negative for rhinorrhea and sneezing.    Eyes: Positive for redness. Negative for visual disturbance.   Respiratory: Negative for shortness of breath and wheezing.    Cardiovascular: Negative for chest pain, palpitations and leg swelling.   Gastrointestinal: Negative for abdominal pain and vomiting.   Genitourinary: Negative for decreased urine volume, difficulty urinating and dysuria.   Musculoskeletal: Negative for gait problem and joint swelling.   Skin: Negative for color change and rash.   Neurological: Negative for dizziness, weakness and light-headedness.   Hematological: Negative for adenopathy.   Psychiatric/Behavioral: Negative for confusion. The patient is not nervous/anxious.          Past Medical History:   Diagnosis Date    Gallstone pancreatitis 09/2016    Gallstones     Hypertension      Past Surgical History:   Procedure Laterality Date    HYSTERECTOMY       Family History   Problem Relation Age of Onset    Heart attack Father     Heart disease Sister     No Known Problems Mother      Social History     Socioeconomic History    Marital status: Single     Spouse name: Not on file    Number of children: Not on file    Years of education: Not on file    Highest education level: Not on file   Occupational History    Not on file   Social Needs    Financial resource strain: Not on file    Food insecurity:     Worry: Not on file     Inability: Not on file    Transportation needs:     Medical: Not on file     Non-medical: Not on file   Tobacco Use    Smoking status: Never Smoker   Substance and Sexual Activity    Alcohol use: Yes     Comment: socially    Drug use: No    Sexual activity: Not on file   Lifestyle    Physical activity:     Days per week: Not on file     Minutes per session: Not on file    Stress: Not on file   Relationships    Social connections:     Talks on phone: Not on file     Gets together: Not on file     Attends [employer]  "service: Not on file     Active member of club or organization: Not on file     Attends meetings of clubs or organizations: Not on file     Relationship status: Not on file   Other Topics Concern    Not on file   Social History Narrative    Not on file     Objective:      Vitals:    09/11/19 1348   BP: (!) 160/80   Pulse: 86   SpO2: 96%   Weight: 81 kg (178 lb 9.2 oz)   Height: 4' 9" (1.448 m)      Physical Exam   Constitutional: She appears well-developed and well-nourished. No distress.   HENT:   Head: Normocephalic and atraumatic.   Nose: Nose normal.   Mouth/Throat: Oropharynx is clear and moist. No oropharyngeal exudate.   Eyes: Pupils are equal, round, and reactive to light. EOM are normal. Right eye exhibits no discharge. Left eye exhibits no discharge. No scleral icterus.   Neck: Neck supple. No tracheal deviation present. No thyromegaly present.   Cardiovascular: Normal rate, regular rhythm, normal heart sounds and intact distal pulses.   No murmur heard.  Pulmonary/Chest: Effort normal and breath sounds normal. No respiratory distress. She has no wheezes.   Abdominal: Soft. Bowel sounds are normal. She exhibits no distension. There is no tenderness.   Musculoskeletal: She exhibits no edema or deformity.   Lymphadenopathy:     She has no cervical adenopathy.   Neurological: She is alert. No cranial nerve deficit. Gait normal.   Skin: Skin is warm and dry. Capillary refill takes less than 2 seconds. She is not diaphoretic. No erythema.   Psychiatric: She has a normal mood and affect. Her behavior is normal.         Lab Results   Component Value Date    WBC 5.16 09/03/2019    HGB 14.3 09/03/2019    HCT 43.7 09/03/2019     09/03/2019    ALT 18 09/03/2019    AST 22 09/03/2019     09/03/2019    K 3.8 09/03/2019     09/03/2019    CREATININE 0.8 09/03/2019    BUN 16 09/03/2019    CO2 26 09/03/2019    INR 0.9 03/16/2017       The ASCVD Risk score (Jacques NIETO Jr., et al., 2013) failed to calculate " for the following reasons:    Cannot find a previous HDL lab    Cannot find a previous total cholesterol lab    (Imaging have been independently reviewed)  CXR without acute abnormality.    Assessment:       1. Essential hypertension    2. 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 Severe obesity (BMI 35.0-35.9 with comorbidity)    4. Encounter for lipid screening for cardiovascular disease    5. Encounter for screening for diabetes mellitus    6. Refused influenza vaccine          Plan:       Karis was seen today for hospital follow up and hypertension.    Diagnoses and all orders for this visit:    Essential hypertension  Comments:  Uncontrolled. Cont Norvasc 10. Increase HCTZ to 25. Nurse visit in 1 wk. Discussed ED return precautions.  Discussed exercise, decrease ETOH.  Orders:  -     amLODIPine (NORVASC) 10 MG tablet; Take 1 tablet (10 mg total) by mouth once daily.  -     hydroCHLOROthiazide (HYDRODIURIL) 25 MG tablet; Take 1 tablet (25 mg total) by mouth once daily.    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  Refused cholecystectomy.  Diet controlled.  Discussed decreasing ETOH.  Will be cautious with thiazides.    Severe obesity (BMI 35.0-35.9 with comorbidity)  Comments:  Improved diet.  Discussed decreasing ETOH.  Encouraged exercise.    Encounter for lipid screening for cardiovascular disease  -     Lipid panel; Future    Encounter for screening for diabetes mellitus  -     Hemoglobin A1c; Future    Refused influenza vaccine  Comments:  Refused despite discussion of risks, benefits.    Other orders  -     Cancel: hydroCHLOROthiazide (HYDRODIURIL) 25 MG tablet; Take 1 tablet (25 mg total) by mouth once daily.         Side effects of medication(s) were discussed in detail and patient voiced understanding.  Patient will call back for any issues or complications.     RTC in 1 month(s) or sooner PRN for uncontrolled HTN.  Nurse visit for BP  check in 1-2 weeks.

## 2019-09-13 ENCOUNTER — LAB VISIT (OUTPATIENT)
Dept: LAB | Facility: OTHER | Age: 79
End: 2019-09-13
Attending: INTERNAL MEDICINE
Payer: MEDICARE

## 2019-09-13 DIAGNOSIS — Z13.6 ENCOUNTER FOR LIPID SCREENING FOR CARDIOVASCULAR DISEASE: ICD-10-CM

## 2019-09-13 DIAGNOSIS — Z13.220 ENCOUNTER FOR LIPID SCREENING FOR CARDIOVASCULAR DISEASE: ICD-10-CM

## 2019-09-13 DIAGNOSIS — Z13.1 ENCOUNTER FOR SCREENING FOR DIABETES MELLITUS: ICD-10-CM

## 2019-09-13 DIAGNOSIS — E78.2 MIXED HYPERLIPIDEMIA: Primary | ICD-10-CM

## 2019-09-13 PROBLEM — R73.03 PRE-DIABETES: Status: ACTIVE | Noted: 2019-09-13

## 2019-09-13 LAB
CHOLEST SERPL-MCNC: 220 MG/DL (ref 120–199)
CHOLEST/HDLC SERPL: 3.7 {RATIO} (ref 2–5)
ESTIMATED AVG GLUCOSE: 123 MG/DL (ref 68–131)
HBA1C MFR BLD HPLC: 5.9 % (ref 4–5.6)
HDLC SERPL-MCNC: 60 MG/DL (ref 40–75)
HDLC SERPL: 27.3 % (ref 20–50)
LDLC SERPL CALC-MCNC: 150 MG/DL (ref 63–159)
NONHDLC SERPL-MCNC: 160 MG/DL
TRIGL SERPL-MCNC: 50 MG/DL (ref 30–150)

## 2019-09-13 PROCEDURE — 36415 COLL VENOUS BLD VENIPUNCTURE: CPT

## 2019-09-13 PROCEDURE — 80061 LIPID PANEL: CPT

## 2019-09-13 PROCEDURE — 83036 HEMOGLOBIN GLYCOSYLATED A1C: CPT

## 2019-09-13 RX ORDER — ATORVASTATIN CALCIUM 40 MG/1
40 TABLET, FILM COATED ORAL DAILY
Qty: 90 TABLET | Refills: 3 | Status: SHIPPED | OUTPATIENT
Start: 2019-09-13 | End: 2019-12-11 | Stop reason: SDUPTHER

## 2019-09-13 RX ORDER — ASPIRIN 81 MG/1
81 TABLET ORAL DAILY
Qty: 90 TABLET | Refills: 3 | Status: SHIPPED | OUTPATIENT
Start: 2019-09-13 | End: 2019-12-11 | Stop reason: SDUPTHER

## 2019-09-16 ENCOUNTER — TELEPHONE (OUTPATIENT)
Dept: INTERNAL MEDICINE | Facility: CLINIC | Age: 79
End: 2019-09-16

## 2019-09-16 NOTE — TELEPHONE ENCOUNTER
----- Message from Vandana Smith MD sent at 9/13/2019  3:31 PM CDT -----  Please call the patient:    Your A1c, a marker for diabetes, is consistent with pre diabetes.  This does not mean that you have diabetes, but that you are at risk for developing diabetes in the future.  We will recheck blood work for diabetes in about 3 months.    Your cholesterol is elevated, which places you at increased risk for heart attacks and strokes.  Considering your risk factors, evidence based guidelines recommend starting a medication to help lower your cholesterol to decrease your risk for heart attacks and strokes.  I will send a prescription to your pharmacy for Lipitor to be taken once a day.  If you develop severe, frequent muscle aches, please stop the medication and notify our office to schedule an appointment.    In addition, you should also take a baby aspirin 81 mg daily.  I will send this medication to your pharmacy, but you may also buy it over the counter if it is cheaper.    I encourage you to try to exercise for about 30 minutes a day, 5 days a week; it can even be as simple as brisk walking.  In addition, try to eat a low fat diet by avoiding fried food, butter, red meat, cheese and saturated fat.  Try to limit sugar, sweets and refined grains, which are found in white bread, white rice, most forms of pasta and packaged snack foods.  You can also try to eat more fiber through fruits and vegetables, oats, beans, nuts, and fish.    The 10-year ASCVD risk score (Jacquesdc NIETO Jr., et al., 2013) is: 26.3%    Values used to calculate the score:      Age: 79 years      Sex: Female      Is Non- : Yes      Diabetic: No      Tobacco smoker: No      Systolic Blood Pressure: 160 mmHg      Is BP treated: Yes      HDL Cholesterol: 60 mg/dL      Total Cholesterol: 220 mg/dL

## 2019-09-16 NOTE — TELEPHONE ENCOUNTER
----- Message from Georgina Hensley sent at 9/16/2019  9:33 AM CDT -----  Contact: RAYA MCPHERSON [[id number]]  Name of Who is Calling: RAYA MCPHERSON [[id number]]    What is the request in detail: Patient is faxing over a form that needs to be filled out, signed by Dr. Smith and returned to her by today. Please contact to further discuss and advise      Can the clinic reply by [facility]: no    What Number to Call Back if not in [facility]: [phone]

## 2019-09-26 ENCOUNTER — TELEPHONE (OUTPATIENT)
Dept: INTERNAL MEDICINE | Facility: CLINIC | Age: 79
End: 2019-09-26

## 2019-09-26 ENCOUNTER — CLINICAL SUPPORT (OUTPATIENT)
Dept: INTERNAL MEDICINE | Facility: CLINIC | Age: 79
End: 2019-09-26
Payer: MEDICARE

## 2019-09-26 VITALS — DIASTOLIC BLOOD PRESSURE: 80 MMHG | OXYGEN SATURATION: 96 % | SYSTOLIC BLOOD PRESSURE: 160 MMHG | HEART RATE: 93 BPM

## 2019-09-26 DIAGNOSIS — I10 ESSENTIAL HYPERTENSION: Primary | ICD-10-CM

## 2019-09-26 PROCEDURE — 99999 PR PBB SHADOW E&M-EST. PATIENT-LVL II: CPT | Mod: PBBFAC,,,

## 2019-09-26 PROCEDURE — 99999 PR PBB SHADOW E&M-EST. PATIENT-LVL II: ICD-10-PCS | Mod: PBBFAC,,,

## 2019-09-26 NOTE — LETTER
September 26, 2019      [facility] [place] [address] [facility] [address]  [address]  [facility] [address]  Phone: [phone]  Fax: [phone]       Patient: Karis Briceño   YOB: 1940  Date of Visit: 09/26/2019    To Whom It May Concern:    Anthony Briceño  was at [facility] on 09/26/2019. She may return to work on 09/27/19 with no restrictions. If you have any questions or concerns, or if I can be of further assistance, please do not hesitate to contact me.    Sincerely,    Dr. Vandana mSith MD

## 2019-09-26 NOTE — TELEPHONE ENCOUNTER
Does patient have record of home blood pressure readings no. Readings have been averaging unknown.   Last dose of blood pressure medication was taken at 0445am.  Patient is asymptomatic.   BP: (!) 160/80 , Pulse: 93 .  Blood pressure reading after 15 minutes was 150/80, Pulse 79.

## 2019-09-26 NOTE — Clinical Note
Does patient have record of home blood pressure readings no. Readings have been averaging unknown. Last dose of blood pressure medication was taken at 0445am.Patient is asymptomatic. BP: (!) 160/80 , Pulse: 93 .Blood pressure reading after 15 minutes was 150/80, Pulse 79.Ms. Briceño would like a order for digital hypertension program

## 2019-09-26 NOTE — PROGRESS NOTES
Karis Briceño 79 y.o. female is here today for Blood Pressure check.   History of HTN yes.    Review of patient's allergies indicates:  No Known Allergies  Creatinine   Date Value Ref Range Status   09/03/2019 0.8 0.5 - 1.4 mg/dL Final     Sodium   Date Value Ref Range Status   09/03/2019 140 136 - 145 mmol/L Final     Potassium   Date Value Ref Range Status   09/03/2019 3.8 3.5 - 5.1 mmol/L Final   ]  Patient verifies taking blood pressure medications on a regular basis at the same time of the day.     Current Outpatient Medications:     amLODIPine (NORVASC) 10 MG tablet, Take 1 tablet (10 mg total) by mouth once daily., Disp: 90 tablet, Rfl: 1    aspirin (ECOTRIN) 81 MG EC tablet, Take 1 tablet (81 mg total) by mouth once daily., Disp: 90 tablet, Rfl: 3    atorvastatin (LIPITOR) 40 MG tablet, Take 1 tablet (40 mg total) by mouth once daily., Disp: 90 tablet, Rfl: 3    fish oil-omega-3 fatty acids 300-1,000 mg capsule, Take 1 capsule by mouth once daily., Disp: , Rfl:     hydroCHLOROthiazide (HYDRODIURIL) 25 MG tablet, Take 1 tablet (25 mg total) by mouth once daily., Disp: 90 tablet, Rfl: 1  Does patient have record of home blood pressure readings no. Readings have been averaging unknown.   Last dose of blood pressure medication was taken at 0445am.  Patient is asymptomatic.   BP: (!) 160/80 , Pulse: 93 .  Blood pressure reading after 15 minutes was 150/80, Pulse 79.  Dr. Smith notified.

## 2019-09-27 ENCOUNTER — TELEPHONE (OUTPATIENT)
Dept: INTERNAL MEDICINE | Facility: CLINIC | Age: 79
End: 2019-09-27

## 2019-09-27 RX ORDER — LOSARTAN POTASSIUM 25 MG/1
25 TABLET ORAL DAILY
Qty: 30 TABLET | Refills: 3 | Status: SHIPPED | OUTPATIENT
Start: 2019-09-27 | End: 2019-10-30 | Stop reason: SDUPTHER

## 2019-09-27 NOTE — TELEPHONE ENCOUNTER
lvm she has an appt to see PCP on 10/11 no need for appt in 2 wks with nv  Told her on machine losartan was sent to [facility] pharmacy

## 2019-09-30 ENCOUNTER — TELEPHONE (OUTPATIENT)
Dept: INTERNAL MEDICINE | Facility: CLINIC | Age: 79
End: 2019-09-30

## 2019-09-30 DIAGNOSIS — I10 ESSENTIAL HYPERTENSION: ICD-10-CM

## 2019-09-30 RX ORDER — AMLODIPINE BESYLATE 10 MG/1
10 TABLET ORAL DAILY
Qty: 90 TABLET | Refills: 1 | Status: SHIPPED | OUTPATIENT
Start: 2019-09-30 | End: 2019-12-11 | Stop reason: SDUPTHER

## 2019-09-30 NOTE — TELEPHONE ENCOUNTER
VM stating that she needs to go to the 2nd floor of the [facility] and get set up at the [facility] its already been ordered for her

## 2019-09-30 NOTE — TELEPHONE ENCOUNTER
----- Message from Vandana Smith MD sent at 9/27/2019  4:09 PM CDT -----  I have ordered digital hypertension.  Please let her know that she should go to the [facility] on the second floor of the [facility] building to answer the questionnaire and get set up.  Thanks!  ----- Message -----  From: Bess Haro LPN  Sent: 9/26/2019   3:42 PM CDT  To: Vandana Smith MD    Does patient have record of home blood pressure readings no. Readings have been averaging unknown.   Last dose of blood pressure medication was taken at 0445am.  Patient is asymptomatic.   BP: (!) 160/80 , Pulse: 93 .  Blood pressure reading after 15 minutes was 150/80, Pulse 79.    Ms. Briceño would like a order for digital hypertension program

## 2019-10-04 ENCOUNTER — PATIENT OUTREACH (OUTPATIENT)
Dept: ADMINISTRATIVE | Facility: HOSPITAL | Age: 79
End: 2019-10-04

## 2019-10-04 ENCOUNTER — TELEPHONE (OUTPATIENT)
Dept: INTERNAL MEDICINE | Facility: CLINIC | Age: 79
End: 2019-10-04

## 2019-10-04 DIAGNOSIS — M94.9 DISORDER OF BONE AND CARTILAGE: Primary | ICD-10-CM

## 2019-10-04 DIAGNOSIS — M89.9 DISORDER OF BONE AND CARTILAGE: Primary | ICD-10-CM

## 2019-10-04 NOTE — TELEPHONE ENCOUNTER
I called pt again to schedule nv   But ended up r/s her visit as she said in the previous call she is unable to come in on 10/11 and fall break is on 10/18 and 10/21  Pt is scheduled for 10/18 at 9:40 am

## 2019-10-04 NOTE — TELEPHONE ENCOUNTER
Spoke to pt and told her she needs a 1-2 nurse visit blood pressure check and she needs to take the new med: losartan  Amlodipine, hctz, and losartan  Pt wanted to know if she can drink her 1-2 beers a day while taking losartan  Pt states she used to drink 3-4 beers a day  Then she said she would drink 1 beer regardless  msg routed to MD

## 2019-10-04 NOTE — TELEPHONE ENCOUNTER
----- Message from Ifeanyi Ruiz, Patient Care Assistant sent at 10/4/2019  9:12 AM CDT -----  Name of Who is Calling: RAYA MCPHERSON [[id number]]    What is the request in detail:Requesting a call back in regards of new blood pressure medication. Patient states she need to know if she's suppose to take 2 blood pressure pills. Please contact to further discuss and advise      Can the clinic reply by [facility]: No    What [facility] to Call Back [facility]   [phone]

## 2019-10-04 NOTE — TELEPHONE ENCOUNTER
Called back regarding alcohol intake   Md advised: I would decrease or avoid drinking alcohol entirely due to her history of gallstones and pancreatitis.   lvm and asked pt to call back

## 2019-10-30 ENCOUNTER — OFFICE VISIT (OUTPATIENT)
Dept: INTERNAL MEDICINE | Facility: CLINIC | Age: 79
End: 2019-10-30
Payer: MEDICARE

## 2019-10-30 VITALS
DIASTOLIC BLOOD PRESSURE: 83 MMHG | HEART RATE: 73 BPM | HEIGHT: 57 IN | SYSTOLIC BLOOD PRESSURE: 163 MMHG | OXYGEN SATURATION: 98 % | BODY MASS INDEX: 37.48 KG/M2 | WEIGHT: 173.75 LBS

## 2019-10-30 DIAGNOSIS — R73.03 PRE-DIABETES: ICD-10-CM

## 2019-10-30 DIAGNOSIS — E78.2 MIXED HYPERLIPIDEMIA: ICD-10-CM

## 2019-10-30 DIAGNOSIS — K08.9 POOR DENTITION: ICD-10-CM

## 2019-10-30 DIAGNOSIS — Z23 NEED FOR PNEUMOCOCCAL VACCINE: ICD-10-CM

## 2019-10-30 DIAGNOSIS — I10 ESSENTIAL HYPERTENSION: Primary | ICD-10-CM

## 2019-10-30 PROCEDURE — 99215 PR OFFICE/OUTPT VISIT, EST, LEVL V, 40-54 MIN: ICD-10-PCS | Mod: S$GLB,,, | Performed by: INTERNAL MEDICINE

## 2019-10-30 PROCEDURE — 3079F DIAST BP 80-89 MM HG: CPT | Mod: CPTII,S$GLB,, | Performed by: INTERNAL MEDICINE

## 2019-10-30 PROCEDURE — 1101F PR PT FALLS ASSESS DOC 0-1 FALLS W/OUT INJ PAST YR: ICD-10-PCS | Mod: CPTII,S$GLB,, | Performed by: INTERNAL MEDICINE

## 2019-10-30 PROCEDURE — 99215 OFFICE O/P EST HI 40 MIN: CPT | Mod: S$GLB,,, | Performed by: INTERNAL MEDICINE

## 2019-10-30 PROCEDURE — 99999 PR PBB SHADOW E&M-EST. PATIENT-LVL III: CPT | Mod: PBBFAC,,, | Performed by: INTERNAL MEDICINE

## 2019-10-30 PROCEDURE — 3079F PR MOST RECENT DIASTOLIC BLOOD PRESSURE 80-89 MM HG: ICD-10-PCS | Mod: CPTII,S$GLB,, | Performed by: INTERNAL MEDICINE

## 2019-10-30 PROCEDURE — 3077F SYST BP >= 140 MM HG: CPT | Mod: CPTII,S$GLB,, | Performed by: INTERNAL MEDICINE

## 2019-10-30 PROCEDURE — 3077F PR MOST RECENT SYSTOLIC BLOOD PRESSURE >= 140 MM HG: ICD-10-PCS | Mod: CPTII,S$GLB,, | Performed by: INTERNAL MEDICINE

## 2019-10-30 PROCEDURE — 99999 PR PBB SHADOW E&M-EST. PATIENT-LVL III: ICD-10-PCS | Mod: PBBFAC,,, | Performed by: INTERNAL MEDICINE

## 2019-10-30 PROCEDURE — 1101F PT FALLS ASSESS-DOCD LE1/YR: CPT | Mod: CPTII,S$GLB,, | Performed by: INTERNAL MEDICINE

## 2019-10-30 RX ORDER — LOSARTAN POTASSIUM 50 MG/1
50 TABLET ORAL DAILY
Qty: 30 TABLET | Refills: 3 | Status: SHIPPED | OUTPATIENT
Start: 2019-10-30 | End: 2019-12-11 | Stop reason: SDUPTHER

## 2019-10-30 NOTE — PROGRESS NOTES
Subjective:       Patient ID: Karis Briceño is a 79 y.o. 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    Chief Complaint: Follow-up (HTN)     History was obtained from the patient and supplemented through chart review.  There were no ER or clinic visits since our last appointment.    Works in a school.    HPI    HTN:    History of med noncompliance and was taking garlic instead.  Stress test negative for ischemia.      Pt's BP is not well controlled on Norvasc 10, HCTZ 25, losartan 25.  +compliant.  Pt denies CP, SOB, lightheadedness, dizziness.  No leg cramps.    FHx: unsure  Tobacco:  None  ETOH:  4 beers a week, wine, used to drink Crown. Now 2 -3 beers at a time during the [place] game.  No snoring, apnea, daytime fatigue, falling asleep during inappropriate times.    Exercise: walk around the gym qAM, 2-3 blocks.  Used to do marathons, CCC, but limited due to sciatica..  Diet:  Eats Kale, veggies, fruits.  No steak in 4 months.  Avoids fried food.  Decreased salt.  Reading food labels.    Pre diabetes:   Lab Results   Component Value Date    HGBA1C 5.9 (H) 09/13/2019     HLD:  Abdominal ultrasound ordered for cholelithiasis suggesting hepatic steatosis.  Compliant with Lipitor 40, aspirin 81.  Wants to add back Omega 3 fish oil.    Diet:  Eats fish.  No fried food.  Baked food.  Lab Results   Component Value Date    LDLCALC 150.0 09/13/2019     The 10-year ASCVD risk score (Jacques GERSON Jr., et al., 2013) is: 26.8%    Values used to calculate the score:      Age: 79 years      Sex: Female      Is Non- : Yes      Diabetic: No      Tobacco smoker: No      Systolic Blood Pressure: 163 mmHg      Is BP treated: Yes      HDL Cholesterol: 60 mg/dL      Total Cholesterol: 220 mg/dL    Poor dentition:   Is missing several teeth.  Does have mild pain at the left mandibular molar.  Will check people's Health to see which dentist is in her  "network.          Not addressed today.  Sciatica:  Sees [facility].  Sometimes ambulate with a cane. Also with shoulder OA.    Obesity:  BMI 37.  Improved diet.  Exercise as above.  Improved diet.  Discussed decreasing ETOH.  Encouraged exercise.    Gallstone:  H/o gallstone pancreatitis 2016.  Did not have choly and improved her diet, avoid fried food.  ETOH as above.  Last attack last year.    History of gallstone pancreatitis.  Refused cholecystectomy.  Diet controlled.  Discussed decreasing ETOH.  Will be cautious with thiazides.      Review of Systems   Constitutional: Negative for fever and unexpected weight change.   HENT: Negative for rhinorrhea and sneezing.    Eyes: Negative for redness and visual disturbance.   Respiratory: Negative for shortness of breath and wheezing.    Cardiovascular: Negative for chest pain, palpitations and leg swelling.   Gastrointestinal: Negative for abdominal pain and vomiting.   Genitourinary: Negative for decreased urine volume, difficulty urinating and dysuria.   Musculoskeletal: Negative for gait problem and joint swelling.   Skin: Negative for color change and rash.   Neurological: Negative for dizziness, weakness and light-headedness.   Hematological: Negative for adenopathy.   Psychiatric/Behavioral: Negative for confusion. The patient is not nervous/anxious.        I personally reviewed Past Medical History, Past Surgical History, Social History, and Family History.    Objective:      Vitals:    10/30/19 1510   BP: (!) 163/83   Pulse: 73   SpO2: 98%   Weight: 78.8 kg (173 lb 11.6 oz)   Height: 4' 9" (1.448 m)      Physical Exam   Constitutional: She appears well-developed and well-nourished. No distress.   HENT:   Head: Normocephalic and atraumatic.   Nose: Nose normal.   Mouth/Throat: Oropharynx is clear and moist. No oropharyngeal exudate.       Poor dentition.  Missing some teeth.   Eyes: Pupils are equal, round, and reactive to light. EOM are normal. Right eye " exhibits no discharge. Left eye exhibits no discharge. No scleral icterus.   Neck: Neck supple. No tracheal deviation present. No thyromegaly present.   Cardiovascular: Normal rate, regular rhythm, normal heart sounds and intact distal pulses.   No murmur heard.  Pulmonary/Chest: Effort normal and breath sounds normal. No respiratory distress. She has no wheezes.   Abdominal: Soft. Bowel sounds are normal. She exhibits no distension. There is no tenderness.   Musculoskeletal: She exhibits edema. She exhibits no deformity.   Trace edema of BLE.   Lymphadenopathy:     She has no cervical adenopathy.   Neurological: She is alert. No cranial nerve deficit. Gait normal.   Skin: Skin is warm and dry. Capillary refill takes less than 2 seconds. She is not diaphoretic. No erythema.   Psychiatric: She has a normal mood and affect. Her behavior is normal.         Lab Results   Component Value Date    WBC 5.16 09/03/2019    HGB 14.3 09/03/2019    HCT 43.7 09/03/2019     09/03/2019    CHOL 220 (H) 09/13/2019    TRIG 50 09/13/2019    HDL 60 09/13/2019    ALT 18 09/03/2019    AST 22 09/03/2019     09/03/2019    K 3.8 09/03/2019     09/03/2019    CREATININE 0.8 09/03/2019    BUN 16 09/03/2019    CO2 26 09/03/2019    INR 0.9 03/16/2017    HGBA1C 5.9 (H) 09/13/2019       The 10-year ASCVD risk score ([place] NIETO Jr., et al., 2013) is: 26.8%    Values used to calculate the score:      Age: 79 years      Sex: Female      Is Non- : Yes      Diabetic: No      Tobacco smoker: No      Systolic Blood Pressure: 163 mmHg      Is BP treated: Yes      HDL Cholesterol: 60 mg/dL      Total Cholesterol: 220 mg/dL    (Imaging have been independently reviewed)  Pharmacologic stress test negative for ischemia.  No wall motion abnormality.    Assessment:       1. Essential hypertension    2. Pre-diabetes    3. Mixed hyperlipidemia    4. Need for pneumococcal vaccine    5. Poor dentition          Plan:        Karis was seen today for follow-up.    Diagnoses and all orders for this visit:    Essential hypertension  Comments:  Uncontrolled. Cont Norvasc 10, HCTZ 25. Increase losartan 25->50. Nurse visit in 1 wk. Discussed exercise, ETOH in moderation.  Orders:  -     losartan (COZAAR) 50 MG tablet; Take 1 tablet (50 mg total) by mouth once daily.    Pre-diabetes  Comments:  Will check A1c annually.    Mixed hyperlipidemia  Comments:  Elevated ASCVD.  Continue Lipitor 40, aspirin 81.  Okay to take fish oil.  Improved diet.    Need for pneumococcal vaccine  Comments:  Advised to obtain PCV13 at Pharmacy.    Poor dentition  Comments:  She will check PHN to see which dentists are in her network.         Side effects of medication(s) were discussed in detail and patient voiced understanding.  Patient will call back for any issues or complications.     RTC in 1 month(s) or sooner PRN for uncontrolled HTN.  Nurse visit for BP check in 1-2 weeks.

## 2019-10-30 NOTE — LETTER
October 30, 2019      [facility] [place] [address]  [facility] [address]  Phone: [phone]  Fax: [phone]       Patient: Karis Briceño   YOB: 1940  Date of Visit: 10/30/2019    To Whom It May Concern:    Anthony Briceño  was at [facility] on 10/30/2019. She may return to work/school on 10/31/2019 with no restrictions. If you have any questions or concerns, or if I can be of further assistance, please do not hesitate to contact me.    Sincerely,    Taylor Mccloud MA

## 2019-10-30 NOTE — PATIENT INSTRUCTIONS
increase the losartan from 25 mg once a day to 50 mg once a day.      If you have extra tablets of the 25 mg, you can take 2 tablets at a time for a total of 50 mg once a day.  When you run out, you can  a new prescription of the Losartan 50 mg, which I have sent to your pharmacy.

## 2019-11-26 ENCOUNTER — HOSPITAL ENCOUNTER (OUTPATIENT)
Dept: RADIOLOGY | Facility: OTHER | Age: 79
Discharge: HOME | End: 2019-11-26
Attending: INTERNAL MEDICINE
Payer: MEDICARE

## 2019-11-26 DIAGNOSIS — M94.9 DISORDER OF BONE AND CARTILAGE: ICD-10-CM

## 2019-11-26 DIAGNOSIS — M89.9 DISORDER OF BONE AND CARTILAGE: ICD-10-CM

## 2019-11-26 PROCEDURE — 77080 DXA BONE DENSITY AXIAL: CPT | Mod: 26,,, | Performed by: RADIOLOGY

## 2019-11-26 PROCEDURE — 77080 DEXA BONE DENSITY SPINE HIP: ICD-10-PCS | Mod: 26,,, | Performed by: RADIOLOGY

## 2019-11-26 PROCEDURE — 77080 DXA BONE DENSITY AXIAL: CPT | Mod: TC

## 2019-11-27 ENCOUNTER — TELEPHONE (OUTPATIENT)
Dept: INTERNAL MEDICINE | Facility: CLINIC | Age: 79
End: 2019-11-27

## 2019-11-27 ENCOUNTER — IMMUNIZATION (OUTPATIENT)
Dept: PHARMACY | Facility: CLINIC | Age: 79
End: 2019-11-27
Payer: MEDICARE

## 2019-11-27 NOTE — TELEPHONE ENCOUNTER
Spoke with patient telling her that Your DEXA scan did not show osteoporosis, which means that you have normal bone density.  We will repeat this every few years.

## 2019-11-27 NOTE — TELEPHONE ENCOUNTER
----- Message from Vandana Smith MD sent at 11/26/2019  9:28 AM CST -----  Please call with results:    Your DEXA scan did not show osteoporosis, which means that you have normal bone density.  We will repeat this every few years.      --------------  DXA with normal BMD.

## 2019-12-11 ENCOUNTER — OFFICE VISIT (OUTPATIENT)
Dept: INTERNAL MEDICINE | Facility: CLINIC | Age: 79
End: 2019-12-11
Payer: MEDICARE

## 2019-12-11 VITALS
WEIGHT: 168.88 LBS | SYSTOLIC BLOOD PRESSURE: 150 MMHG | HEART RATE: 86 BPM | BODY MASS INDEX: 36.43 KG/M2 | HEIGHT: 57 IN | DIASTOLIC BLOOD PRESSURE: 68 MMHG | OXYGEN SATURATION: 98 %

## 2019-12-11 DIAGNOSIS — I10 ESSENTIAL HYPERTENSION: Primary | ICD-10-CM

## 2019-12-11 DIAGNOSIS — R73.03 PRE-DIABETES: ICD-10-CM

## 2019-12-11 DIAGNOSIS — E78.2 MIXED HYPERLIPIDEMIA: ICD-10-CM

## 2019-12-11 PROCEDURE — 1126F AMNT PAIN NOTED NONE PRSNT: CPT | Mod: S$GLB,,, | Performed by: INTERNAL MEDICINE

## 2019-12-11 PROCEDURE — 3078F PR MOST RECENT DIASTOLIC BLOOD PRESSURE < 80 MM HG: ICD-10-PCS | Mod: CPTII,S$GLB,, | Performed by: INTERNAL MEDICINE

## 2019-12-11 PROCEDURE — 99999 PR PBB SHADOW E&M-EST. PATIENT-LVL III: ICD-10-PCS | Mod: PBBFAC,,, | Performed by: INTERNAL MEDICINE

## 2019-12-11 PROCEDURE — 99999 PR PBB SHADOW E&M-EST. PATIENT-LVL III: CPT | Mod: PBBFAC,,, | Performed by: INTERNAL MEDICINE

## 2019-12-11 PROCEDURE — 99215 OFFICE O/P EST HI 40 MIN: CPT | Mod: S$GLB,,, | Performed by: INTERNAL MEDICINE

## 2019-12-11 PROCEDURE — 3078F DIAST BP <80 MM HG: CPT | Mod: CPTII,S$GLB,, | Performed by: INTERNAL MEDICINE

## 2019-12-11 PROCEDURE — 1159F MED LIST DOCD IN RCRD: CPT | Mod: S$GLB,,, | Performed by: INTERNAL MEDICINE

## 2019-12-11 PROCEDURE — 3077F PR MOST RECENT SYSTOLIC BLOOD PRESSURE >= 140 MM HG: ICD-10-PCS | Mod: CPTII,S$GLB,, | Performed by: INTERNAL MEDICINE

## 2019-12-11 PROCEDURE — 1126F PR PAIN SEVERITY QUANTIFIED, NO PAIN PRESENT: ICD-10-PCS | Mod: S$GLB,,, | Performed by: INTERNAL MEDICINE

## 2019-12-11 PROCEDURE — 1101F PT FALLS ASSESS-DOCD LE1/YR: CPT | Mod: CPTII,S$GLB,, | Performed by: INTERNAL MEDICINE

## 2019-12-11 PROCEDURE — 99215 PR OFFICE/OUTPT VISIT, EST, LEVL V, 40-54 MIN: ICD-10-PCS | Mod: S$GLB,,, | Performed by: INTERNAL MEDICINE

## 2019-12-11 PROCEDURE — 1101F PR PT FALLS ASSESS DOC 0-1 FALLS W/OUT INJ PAST YR: ICD-10-PCS | Mod: CPTII,S$GLB,, | Performed by: INTERNAL MEDICINE

## 2019-12-11 PROCEDURE — 1159F PR MEDICATION LIST DOCUMENTED IN MEDICAL RECORD: ICD-10-PCS | Mod: S$GLB,,, | Performed by: INTERNAL MEDICINE

## 2019-12-11 PROCEDURE — 3077F SYST BP >= 140 MM HG: CPT | Mod: CPTII,S$GLB,, | Performed by: INTERNAL MEDICINE

## 2019-12-11 RX ORDER — ATORVASTATIN CALCIUM 40 MG/1
40 TABLET, FILM COATED ORAL DAILY
Qty: 90 TABLET | Refills: 3 | Status: SHIPPED | OUTPATIENT
Start: 2019-12-11 | End: 2020-06-22 | Stop reason: SDUPTHER

## 2019-12-11 RX ORDER — AMLODIPINE BESYLATE 10 MG/1
10 TABLET ORAL DAILY
Qty: 90 TABLET | Refills: 3 | Status: SHIPPED | OUTPATIENT
Start: 2019-12-11 | End: 2020-02-21 | Stop reason: SDUPTHER

## 2019-12-11 RX ORDER — LOSARTAN POTASSIUM 100 MG/1
100 TABLET ORAL DAILY
Qty: 30 TABLET | Refills: 3 | Status: SHIPPED | OUTPATIENT
Start: 2019-12-11 | End: 2019-12-24

## 2019-12-11 RX ORDER — HYDROCHLOROTHIAZIDE 25 MG/1
25 TABLET ORAL DAILY
Qty: 90 TABLET | Refills: 3 | Status: SHIPPED | OUTPATIENT
Start: 2019-12-11 | End: 2020-02-21 | Stop reason: SDUPTHER

## 2019-12-11 RX ORDER — ASPIRIN 81 MG/1
81 TABLET ORAL DAILY
Qty: 90 TABLET | Refills: 3 | Status: SHIPPED | OUTPATIENT
Start: 2019-12-11 | End: 2020-03-23 | Stop reason: SDUPTHER

## 2019-12-11 NOTE — LETTER
December 11, 2019      [facility] [place] [address] [facility] [address]  [address]  [facility] [address]  Phone: [phone]  Fax: [phone]       Patient: Karis Briceño   YOB: 1940  Date of Visit: 12/11/2019    To Whom It May Concern:    Anthony Briceño  was at [facility] on 12/11/2019. She may return to work/school on 12/11/2019 with no restrictions. If you have any questions or concerns, or if I can be of further assistance, please do not hesitate to contact me.    Sincerely,    Taylor Mccloud MA

## 2019-12-11 NOTE — PROGRESS NOTES
Subjective:       Patient ID: Karis Briceño is a 79 y.o. 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    Chief Complaint: Follow-up (HTN)     History was obtained from the patient and supplemented through chart review.  There were no ER or clinic visits since our last appointment.    Works in a school.    HPI    HTN:    History of med noncompliance and was taking garlic instead.  Stress test negative for ischemia.    Pt's BP is not controlled on Norvasc 10, HCTZ 25, losartan increased to 50.  Wants to also take garlic supplements.  +compliant.  Took meds today.  Pt denies CP, SOB, lightheadedness, dizziness.  No leg cramps, edema.    FHx: unsure  Tobacco:  None  ETOH:  4 beers a week, wine, used to drink Crown. Now 2 -3 beers at a time during the [place] game.  No snoring, apnea, daytime fatigue, falling asleep during inappropriate times.    Exercise: walk around the gym qAM, 2-3 blocks.  Used to do marathons, CCC, but limited due to sciatica.    Diet:  Eats kale, veggies, fruits.  No steak in 4 months.  Avoids fried food.  Decreased salt.  Reading food labels.  Tries to buy low salt items.    HLD:    Abdominal ultrasound ordered for cholelithiasis suggesting hepatic steatosis.  Compliant with Lipitor 40, aspirin 81.  Taking Omega 3 fish oil.    Diet:  Eats fish.  No fried food.  Baked food.  Lab Results   Component Value Date    LDLCALC 150.0 09/13/2019     The 10-year ASCVD risk score ([place] NIETO Jr., et al., 2013) is: 24.6%    Values used to calculate the score:      Age: 79 years      Sex: Female      Is Non- : Yes      Diabetic: No      Tobacco smoker: No      Systolic Blood Pressure: 150 mmHg      Is BP treated: Yes      HDL Cholesterol: 60 mg/dL      Total Cholesterol: 220 mg/dL    Pre diabetes:   Lab Results   Component Value Date    HGBA1C 5.9 (H) 09/13/2019             Not addressed today.  Obesity:  BMI 36.  Improved diet.   "Exercise as above.  Improved diet.  Discussed decreasing ETOH.  Encouraged exercise, dietary changes.    Sciatica:  Sees [facility].  Sometimes ambulate with a cane. Also with shoulder OA.    Gallstone:  H/o gallstone pancreatitis 2016.  Did not have choly and improved her diet, avoid fried food.  ETOH as above.  Last attack last year.    History of gallstone pancreatitis.  Refused cholecystectomy.  Diet controlled.  Discussed decreasing ETOH.  Will be cautious with thiazides.      Review of Systems   Constitutional: Negative for fever and unexpected weight change.   HENT: Negative for rhinorrhea and sneezing.    Eyes: Negative for redness and visual disturbance.   Respiratory: Negative for shortness of breath and wheezing.    Cardiovascular: Negative for chest pain, palpitations and leg swelling.   Gastrointestinal: Negative for abdominal pain and vomiting.   Genitourinary: Negative for decreased urine volume, difficulty urinating and dysuria.   Musculoskeletal: Negative for gait problem and joint swelling.   Skin: Negative for color change and rash.   Neurological: Negative for dizziness, weakness and light-headedness.   Hematological: Negative for adenopathy.   Psychiatric/Behavioral: Negative for confusion. The patient is not nervous/anxious.        I personally reviewed Past Medical History, Past Surgical History, Social History, and Family History.    Objective:      Vitals:    12/11/19 0810   BP: (!) 150/68   Pulse: 86   SpO2: 98%   Weight: 76.6 kg (168 lb 14 oz)   Height: 4' 9" (1.448 m)      Physical Exam   Constitutional: She appears well-developed and well-nourished. No distress.   HENT:   Head: Normocephalic and atraumatic.   Nose: Nose normal.   Mouth/Throat: Oropharynx is clear and moist. No oropharyngeal exudate.   Poor dentition.  Missing some teeth.   Eyes: Pupils are equal, round, and reactive to light. EOM are normal. Right eye exhibits no discharge. Left eye exhibits no discharge. No scleral " icterus.   Neck: Neck supple. No tracheal deviation present. No thyromegaly present.   Cardiovascular: Normal rate, regular rhythm, normal heart sounds and intact distal pulses.   No murmur heard.  Pulmonary/Chest: Effort normal and breath sounds normal. No respiratory distress. She has no wheezes.   Abdominal: Soft. Bowel sounds are normal. She exhibits no distension. There is no tenderness.   Musculoskeletal: She exhibits no edema or deformity.   Lymphadenopathy:     She has no cervical adenopathy.   Neurological: She is alert. No cranial nerve deficit. Gait normal.   Skin: Skin is warm and dry. Capillary refill takes less than 2 seconds. She is not diaphoretic. No erythema.   Psychiatric: She has a normal mood and affect. Her behavior is normal.         Lab Results   Component Value Date    WBC 5.16 09/03/2019    HGB 14.3 09/03/2019    HCT 43.7 09/03/2019     09/03/2019    CHOL 220 (H) 09/13/2019    TRIG 50 09/13/2019    HDL 60 09/13/2019    ALT 18 09/03/2019    AST 22 09/03/2019     09/03/2019    K 3.8 09/03/2019     09/03/2019    CREATININE 0.8 09/03/2019    BUN 16 09/03/2019    CO2 26 09/03/2019    INR 0.9 03/16/2017    HGBA1C 5.9 (H) 09/13/2019       The 10-year ASCVD risk score (Jacquesdc NIETO Jr., et al., 2013) is: 24.6%    Values used to calculate the score:      Age: 79 years      Sex: Female      Is Non- : Yes      Diabetic: No      Tobacco smoker: No      Systolic Blood Pressure: 150 mmHg      Is BP treated: Yes      HDL Cholesterol: 60 mg/dL      Total Cholesterol: 220 mg/dL    (Imaging have been independently reviewed)  DXA with normal BMD.    Assessment:       1. Essential hypertension    2. Mixed hyperlipidemia    3. Pre-diabetes          Plan:       Karis was seen today for follow-up.    Diagnoses and all orders for this visit:    Essential hypertension  Comments:  Uncontrolled. Cont Norvasc 10, HCTZ 25. Increase losartan 50->100. Nurse visit in 1 wk.  If  elevated, consider switch to valsartan.  Orders:  -     losartan (COZAAR) 100 MG tablet; Take 1 tablet (100 mg total) by mouth once daily.  -     hydroCHLOROthiazide (HYDRODIURIL) 25 MG tablet; Take 1 tablet (25 mg total) by mouth once daily.  -     amLODIPine (NORVASC) 10 MG tablet; Take 1 tablet (10 mg total) by mouth once daily.    Mixed hyperlipidemia  Comments:  Elevated ASCVD.  Continue Lipitor 40, aspirin 81.  Improved diet.  Orders:  -     aspirin (ECOTRIN) 81 MG EC tablet; Take 1 tablet (81 mg total) by mouth once daily.  -     atorvastatin (LIPITOR) 40 MG tablet; Take 1 tablet (40 mg total) by mouth once daily.    Pre-diabetes  Comments:  Controlled.  Will check A1c annually.         Side effects of medication(s) were discussed in detail and patient voiced understanding.  Patient will call back for any issues or complications.     RTC in 3 month(s) or sooner PRN for uncontrolled HTN.  Nurse visit for BP check in 1-2 weeks.

## 2019-12-24 ENCOUNTER — TELEPHONE (OUTPATIENT)
Dept: INTERNAL MEDICINE | Facility: CLINIC | Age: 79
End: 2019-12-24

## 2019-12-24 ENCOUNTER — CLINICAL SUPPORT (OUTPATIENT)
Dept: INTERNAL MEDICINE | Facility: CLINIC | Age: 79
End: 2019-12-24
Payer: MEDICARE

## 2019-12-24 VITALS — OXYGEN SATURATION: 98 % | DIASTOLIC BLOOD PRESSURE: 60 MMHG | HEART RATE: 80 BPM | SYSTOLIC BLOOD PRESSURE: 144 MMHG

## 2019-12-24 DIAGNOSIS — I10 ESSENTIAL HYPERTENSION: Primary | ICD-10-CM

## 2019-12-24 PROCEDURE — 99999 PR PBB SHADOW E&M-EST. PATIENT-LVL I: CPT | Mod: PBBFAC,,,

## 2019-12-24 PROCEDURE — 99999 PR PBB SHADOW E&M-EST. PATIENT-LVL I: ICD-10-PCS | Mod: PBBFAC,,,

## 2019-12-24 RX ORDER — VALSARTAN 80 MG/1
80 TABLET ORAL DAILY
Qty: 30 TABLET | Refills: 3 | Status: SHIPPED | OUTPATIENT
Start: 2019-12-24 | End: 2020-01-16 | Stop reason: SDUPTHER

## 2019-12-24 NOTE — PROGRESS NOTES
Karis Briceño 79 y.o. female is here today for Blood Pressure check.   History of HTN yes.    Review of patient's allergies indicates:  No Known Allergies  Creatinine   Date Value Ref Range Status   09/03/2019 0.8 0.5 - 1.4 mg/dL Final     Sodium   Date Value Ref Range Status   09/03/2019 140 136 - 145 mmol/L Final     Potassium   Date Value Ref Range Status   09/03/2019 3.8 3.5 - 5.1 mmol/L Final   ]  Patient verifies taking blood pressure medications on a regular bases at the same time of the day.     Current Outpatient Medications:     amLODIPine (NORVASC) 10 MG tablet, Take 1 tablet (10 mg total) by mouth once daily., Disp: 90 tablet, Rfl: 3    aspirin (ECOTRIN) 81 MG EC tablet, Take 1 tablet (81 mg total) by mouth once daily., Disp: 90 tablet, Rfl: 3    atorvastatin (LIPITOR) 40 MG tablet, Take 1 tablet (40 mg total) by mouth once daily., Disp: 90 tablet, Rfl: 3    fish oil-omega-3 fatty acids 300-1,000 mg capsule, Take 1 capsule by mouth once daily., Disp: , Rfl:     hydroCHLOROthiazide (HYDRODIURIL) 25 MG tablet, Take 1 tablet (25 mg total) by mouth once daily., Disp: 90 tablet, Rfl: 3    losartan (COZAAR) 100 MG tablet, Take 1 tablet (100 mg total) by mouth once daily., Disp: 30 tablet, Rfl: 3  Does patient have record of home blood pressure readings no.   Patient is asymptomatic.       BP: (!) 144/60 , Pulse: 80.    Blood pressure reading after 15 minutes was 140/70, Pulse 70.   notified.

## 2019-12-24 NOTE — Clinical Note
Does patient have record of home blood pressure readings no. Patient is asymptomatic. BP: (!) 144/60 , Pulse: 80.Blood pressure reading after 15 minutes was 140/70, Pulse 70. notified.

## 2019-12-24 NOTE — TELEPHONE ENCOUNTER
"Spoke with pt and informed her that Dr. Smith wanted her to know " Continue taking:   -Amlodipine/Norvasc 10 once a day   -hydrochlorothiazide 25 once a day     Stop taking:   -losartan that was increased to 100 mg a day.     New med:   -Losartan will be switched to valsartan 80 mg once a day.     Please schedule Nurse visit for BP check in 1-2 weeks. Thanks! " BP checked scheduled for 1/10/2020 @0900am. Pt verbalized understanding   "

## 2019-12-24 NOTE — TELEPHONE ENCOUNTER
Does patient have record of home blood pressure readings no.   Patient is asymptomatic.       BP: (!) 144/60 , Pulse: 80.    Blood pressure reading after 15 minutes was 140/70, Pulse 70.   notified.

## 2020-01-10 ENCOUNTER — CLINICAL SUPPORT (OUTPATIENT)
Dept: INTERNAL MEDICINE | Facility: CLINIC | Age: 80
End: 2020-01-10
Payer: MEDICARE

## 2020-01-10 ENCOUNTER — TELEPHONE (OUTPATIENT)
Dept: INTERNAL MEDICINE | Facility: CLINIC | Age: 80
End: 2020-01-10

## 2020-01-10 VITALS — HEART RATE: 91 BPM | OXYGEN SATURATION: 98 % | SYSTOLIC BLOOD PRESSURE: 150 MMHG | DIASTOLIC BLOOD PRESSURE: 70 MMHG

## 2020-01-10 DIAGNOSIS — I10 ESSENTIAL HYPERTENSION: ICD-10-CM

## 2020-01-10 PROCEDURE — 99999 PR PBB SHADOW E&M-EST. PATIENT-LVL I: CPT | Mod: PBBFAC,,,

## 2020-01-10 PROCEDURE — 99999 PR PBB SHADOW E&M-EST. PATIENT-LVL I: ICD-10-PCS | Mod: PBBFAC,,,

## 2020-01-10 NOTE — TELEPHONE ENCOUNTER
Does patient have record of home blood pressure readings no.   Last dose of blood pressure medication was taken at 0500AM.  Patient is asymptomatic.    BP: (!) 150/70 , Pulse: 91.    Blood pressure reading after 15 minutes was.Pt stated she dose not have time to wait another 15mins to recheck bp. Pt stated she have to go to work.  Dr. Smith notified.

## 2020-01-10 NOTE — Clinical Note
Does patient have record of home blood pressure readings no. Last dose of blood pressure medication was taken at 0500AM.Patient is asymptomatic.BP: (!) 150/70 , Pulse: 91.Blood pressure reading after 15 minutes was . Pt stated she dose not have time to wait another 15mins to recheck bp. Pt stated she have to go to work.Dr. Smith notified.

## 2020-01-10 NOTE — PROGRESS NOTES
Karis Briceño 79 y.o. female is here today for Blood Pressure check.   History of HTN yes.    Review of patient's allergies indicates:  No Known Allergies  Creatinine   Date Value Ref Range Status   09/03/2019 0.8 0.5 - 1.4 mg/dL Final     Sodium   Date Value Ref Range Status   09/03/2019 140 136 - 145 mmol/L Final     Potassium   Date Value Ref Range Status   09/03/2019 3.8 3.5 - 5.1 mmol/L Final   ]  Patient verifies taking blood pressure medications on a regular bases at the same time of the day.     Current Outpatient Medications:     amLODIPine (NORVASC) 10 MG tablet, Take 1 tablet (10 mg total) by mouth once daily., Disp: 90 tablet, Rfl: 3    aspirin (ECOTRIN) 81 MG EC tablet, Take 1 tablet (81 mg total) by mouth once daily., Disp: 90 tablet, Rfl: 3    atorvastatin (LIPITOR) 40 MG tablet, Take 1 tablet (40 mg total) by mouth once daily., Disp: 90 tablet, Rfl: 3    fish oil-omega-3 fatty acids 300-1,000 mg capsule, Take 1 capsule by mouth once daily., Disp: , Rfl:     hydroCHLOROthiazide (HYDRODIURIL) 25 MG tablet, Take 1 tablet (25 mg total) by mouth once daily., Disp: 90 tablet, Rfl: 3    valsartan (DIOVAN) 80 MG tablet, Take 1 tablet (80 mg total) by mouth once daily., Disp: 30 tablet, Rfl: 3  Does patient have record of home blood pressure readings no.   Last dose of blood pressure medication was taken at 0500AM.  Patient is asymptomatic.    BP: (!) 150/70 , Pulse: 91.    Blood pressure reading after 15 minutes was. Pt stated she dose not have time to wait another 15mins to recheck bp. Pt stated she have to go to work.  Dr. Smith notified.

## 2020-01-16 RX ORDER — VALSARTAN 160 MG/1
160 TABLET ORAL DAILY
Qty: 30 TABLET | Refills: 3 | Status: SHIPPED | OUTPATIENT
Start: 2020-01-16 | End: 2020-02-21 | Stop reason: SDUPTHER

## 2020-01-17 NOTE — TELEPHONE ENCOUNTER
Spoke with pt in regards to nurse visit in one week. Scheduled for 1/24/2020 @ 11am. Pt verbalized understanding. Pt was informed on medication changes in another encounter.

## 2020-01-23 ENCOUNTER — TELEPHONE (OUTPATIENT)
Dept: INTERNAL MEDICINE | Facility: CLINIC | Age: 80
End: 2020-01-23

## 2020-01-23 NOTE — TELEPHONE ENCOUNTER
----- Message from Seema Bhatt sent at 1/23/2020  4:26 PM CST -----  Contact: Self      Name of Who is Calling: RAYA MCPHERSON [[id number]]      What is the request in detail: Pt request for someone to call her when a 7am or 7:15am for a blood pressure check is available.Please contact to further discuss and advise.        Can the clinic reply by [facility]: N      What Number to Call Back if not in [facility]: [phone]

## 2020-01-23 NOTE — TELEPHONE ENCOUNTER
"----- Message from Frances May sent at 1/23/2020  9:55 AM CST -----  Contact: RAYA MCPHERSON [[id number]]  Name of Who is Calling:RAYA MCPHERSON [[id number]]      What is the request in detail:   Patient called stating, "that she will be here on Friday 01/24/2020 for a BP check and she'll be there for 7:00AM."     Please give a call back at your earliest convenience and further advise.       THANKS!      Reply by [facility]: no      Call Back: RAYA MCPHERSON // ph# [phone]                                      "

## 2020-01-23 NOTE — TELEPHONE ENCOUNTER
Spoke with pt in regards nurse visit. Rescheduled nurse visit for 1/29/2020. Pt verbalized understanding

## 2020-02-07 ENCOUNTER — TELEPHONE (OUTPATIENT)
Dept: INTERNAL MEDICINE | Facility: CLINIC | Age: 80
End: 2020-02-07

## 2020-02-07 NOTE — TELEPHONE ENCOUNTER
Called pt and informed her Provider template has changed and your appointment on 2/28/2020 time had been changed new appt time is 730 am.  pt showed verbal understanding

## 2020-02-21 DIAGNOSIS — I10 ESSENTIAL HYPERTENSION: ICD-10-CM

## 2020-02-21 RX ORDER — HYDROCHLOROTHIAZIDE 25 MG/1
25 TABLET ORAL DAILY
Qty: 90 TABLET | Refills: 3 | Status: SHIPPED | OUTPATIENT
Start: 2020-02-21 | End: 2020-06-10 | Stop reason: SDUPTHER

## 2020-02-21 RX ORDER — AMLODIPINE BESYLATE 10 MG/1
10 TABLET ORAL DAILY
Qty: 90 TABLET | Refills: 3 | Status: SHIPPED | OUTPATIENT
Start: 2020-02-21 | End: 2020-06-10 | Stop reason: SDUPTHER

## 2020-02-21 RX ORDER — VALSARTAN 160 MG/1
160 TABLET ORAL DAILY
Qty: 30 TABLET | Refills: 3 | Status: SHIPPED | OUTPATIENT
Start: 2020-02-21 | End: 2020-05-22 | Stop reason: SDUPTHER

## 2020-02-28 ENCOUNTER — OFFICE VISIT (OUTPATIENT)
Dept: INTERNAL MEDICINE | Facility: CLINIC | Age: 80
End: 2020-02-28
Payer: MEDICARE

## 2020-02-28 VITALS
SYSTOLIC BLOOD PRESSURE: 136 MMHG | OXYGEN SATURATION: 98 % | DIASTOLIC BLOOD PRESSURE: 68 MMHG | HEART RATE: 80 BPM | HEIGHT: 57 IN | WEIGHT: 174.63 LBS | BODY MASS INDEX: 37.67 KG/M2

## 2020-02-28 DIAGNOSIS — R73.03 PRE-DIABETES: ICD-10-CM

## 2020-02-28 DIAGNOSIS — Z23 NEED FOR ZOSTER VACCINE: ICD-10-CM

## 2020-02-28 DIAGNOSIS — E78.2 MIXED HYPERLIPIDEMIA: ICD-10-CM

## 2020-02-28 DIAGNOSIS — E66.01 SEVERE OBESITY (BMI 35.0-35.9 WITH COMORBIDITY): ICD-10-CM

## 2020-02-28 DIAGNOSIS — I10 ESSENTIAL HYPERTENSION: Primary | ICD-10-CM

## 2020-02-28 PROCEDURE — 99215 OFFICE O/P EST HI 40 MIN: CPT | Mod: S$GLB,,, | Performed by: INTERNAL MEDICINE

## 2020-02-28 PROCEDURE — 99999 PR PBB SHADOW E&M-EST. PATIENT-LVL III: ICD-10-PCS | Mod: PBBFAC,,, | Performed by: INTERNAL MEDICINE

## 2020-02-28 PROCEDURE — 1159F PR MEDICATION LIST DOCUMENTED IN MEDICAL RECORD: ICD-10-PCS | Mod: S$GLB,,, | Performed by: INTERNAL MEDICINE

## 2020-02-28 PROCEDURE — 99215 PR OFFICE/OUTPT VISIT, EST, LEVL V, 40-54 MIN: ICD-10-PCS | Mod: S$GLB,,, | Performed by: INTERNAL MEDICINE

## 2020-02-28 PROCEDURE — 1101F PT FALLS ASSESS-DOCD LE1/YR: CPT | Mod: CPTII,S$GLB,, | Performed by: INTERNAL MEDICINE

## 2020-02-28 PROCEDURE — 1101F PR PT FALLS ASSESS DOC 0-1 FALLS W/OUT INJ PAST YR: ICD-10-PCS | Mod: CPTII,S$GLB,, | Performed by: INTERNAL MEDICINE

## 2020-02-28 PROCEDURE — 3078F DIAST BP <80 MM HG: CPT | Mod: CPTII,S$GLB,, | Performed by: INTERNAL MEDICINE

## 2020-02-28 PROCEDURE — 3075F PR MOST RECENT SYSTOLIC BLOOD PRESS GE 130-139MM HG: ICD-10-PCS | Mod: CPTII,S$GLB,, | Performed by: INTERNAL MEDICINE

## 2020-02-28 PROCEDURE — 3078F PR MOST RECENT DIASTOLIC BLOOD PRESSURE < 80 MM HG: ICD-10-PCS | Mod: CPTII,S$GLB,, | Performed by: INTERNAL MEDICINE

## 2020-02-28 PROCEDURE — 1126F PR PAIN SEVERITY QUANTIFIED, NO PAIN PRESENT: ICD-10-PCS | Mod: S$GLB,,, | Performed by: INTERNAL MEDICINE

## 2020-02-28 PROCEDURE — 99999 PR PBB SHADOW E&M-EST. PATIENT-LVL III: CPT | Mod: PBBFAC,,, | Performed by: INTERNAL MEDICINE

## 2020-02-28 PROCEDURE — 1159F MED LIST DOCD IN RCRD: CPT | Mod: S$GLB,,, | Performed by: INTERNAL MEDICINE

## 2020-02-28 PROCEDURE — 1126F AMNT PAIN NOTED NONE PRSNT: CPT | Mod: S$GLB,,, | Performed by: INTERNAL MEDICINE

## 2020-02-28 PROCEDURE — 3075F SYST BP GE 130 - 139MM HG: CPT | Mod: CPTII,S$GLB,, | Performed by: INTERNAL MEDICINE

## 2020-02-28 RX ORDER — VALSARTAN 160 MG/1
160 TABLET ORAL DAILY
Qty: 30 TABLET | Refills: 3 | Status: CANCELLED | OUTPATIENT
Start: 2020-02-28 | End: 2021-02-27

## 2020-02-28 NOTE — PROGRESS NOTES
Subjective:       Patient ID: Karis Briceño is a 79 y.o. 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    Chief Complaint: Hypertension     History was obtained from the patient and supplemented through chart review.  There were no ER or clinic visits since our last appointment.    Works in a school.    HPI    HTN:    History of med noncompliance and was taking garlic instead.  Stress test negative for ischemia 9/2019.      Pt's BP is not controlled.  Is supposed to take Norvasc 10, HCTZ 25, losartan switched to Valsartan 160. Could not  Valsartan x 1 week due to [place] parades, but picked it up today.  Has been taking Losartan 50 instead of Valsartan x 1 week, but still taking Norvasc, HCTZ.  Pt denies CP, SOB, lightheadedness, dizziness.  No leg cramps, edema.  Home BP: none.  Doesn't want to know.    FHx: unsure  Tobacco:  None  No snoring, apnea, daytime fatigue, falling asleep during inappropriate times.    ETOH:  2-3 beers/day after work, more during [place].    Exercise: Walks.  Wants to start going to the gym, but limited d/t work schedule. Used to do marathons, CCC, but limited due to sciatica.  Enjoys dancing.    Diet:  Eats kale, veggies, fruits.  Stopped eating steak. Avoids fried food.  Decreased salt.  Reading food labels.  Tries to buy low salt items.    HLD:    Abdominal ultrasound ordered for cholelithiasis suggesting hepatic steatosis.  Compliant with Lipitor 40, aspirin 81.  Taking Omega 3 fish oil.    Lab Results   Component Value Date    LDLCALC 150.0 09/13/2019     The 10-year ASCVD risk score ([place] NIETO Jr., et al., 2013) is: 22.3%    Values used to calculate the score:      Age: 79 years      Sex: Female      Is Non- : Yes      Diabetic: No      Tobacco smoker: No      Systolic Blood Pressure: 136 mmHg      Is BP treated: Yes      HDL Cholesterol: 60 mg/dL      Total Cholesterol: 220 mg/dL    Pre  "diabetes:   Lab Results   Component Value Date    HGBA1C 5.9 (H) 09/13/2019     Obesity: Body mass index is 37.78 kg/m².  Diet, exercise as above.            Not addressed today.  Sciatica:  Sees [facility]. Completed PT. Sometimes ambulate with a cane. Also with shoulder OA.  Helps with shea butter.    Gallstone:  H/o gallstone pancreatitis 2016.  Did not have choly and improved her diet, avoid fried food.  ETOH as above.  Last attack 2018.    History of gallstone pancreatitis.  Refused cholecystectomy.  Diet controlled.  Discussed decreasing ETOH.  Will be cautious with thiazides.    Review of Systems   Constitutional: Negative for fever and unexpected weight change.   HENT: Negative for rhinorrhea and sneezing.    Eyes: Negative for redness and visual disturbance.   Respiratory: Negative for shortness of breath and wheezing.    Cardiovascular: Negative for chest pain and leg swelling.   Gastrointestinal: Negative for abdominal pain and vomiting.   Genitourinary: Negative for decreased urine volume, difficulty urinating and dysuria.   Musculoskeletal: Negative for gait problem and joint swelling.   Skin: Negative for color change and rash.   Neurological: Negative for dizziness, weakness and light-headedness.   Hematological: Negative for adenopathy.   Psychiatric/Behavioral: Negative for confusion. The patient is not nervous/anxious.        I personally reviewed Past Medical History, Past Surgical History, Social History, and Family History.    Objective:      Vitals:    02/28/20 0716 02/28/20 0719   BP: (!) 142/70 136/68   BP Location: Left arm Right arm   Patient Position: Sitting Sitting   BP Method: Medium (Manual) Medium (Manual)   Pulse: 80    SpO2: 98%    Weight: 79.2 kg (174 lb 9.7 oz)    Height: 4' 9" (1.448 m)       Physical Exam   Constitutional: She appears well-developed and well-nourished. No distress.   HENT:   Head: Normocephalic and atraumatic.   Nose: Nose normal.   Mouth/Throat: Oropharynx is " clear and moist. No oropharyngeal exudate.   Poor dentition.  Missing some teeth.   Eyes: EOM are normal. Right eye exhibits no discharge. Left eye exhibits no discharge. No scleral icterus.   Neck: Neck supple. No tracheal deviation present. No thyromegaly present.   Cardiovascular: Normal rate, regular rhythm, normal heart sounds and intact distal pulses.   No murmur heard.  Pulmonary/Chest: Effort normal and breath sounds normal. No respiratory distress. She has no wheezes.   Abdominal: Soft. Bowel sounds are normal. She exhibits no distension. There is no tenderness.   Musculoskeletal: She exhibits no edema or deformity.   Lymphadenopathy:     She has no cervical adenopathy.   Neurological: She is alert. No cranial nerve deficit. Gait normal.   Skin: Skin is warm and dry. She is not diaphoretic. No erythema.   Psychiatric: She has a normal mood and affect. Her behavior is normal.         Lab Results   Component Value Date    WBC 5.16 09/03/2019    HGB 14.3 09/03/2019    HCT 43.7 09/03/2019     09/03/2019    CHOL 220 (H) 09/13/2019    TRIG 50 09/13/2019    HDL 60 09/13/2019    ALT 18 09/03/2019    AST 22 09/03/2019     09/03/2019    K 3.8 09/03/2019     09/03/2019    CREATININE 0.8 09/03/2019    BUN 16 09/03/2019    CO2 26 09/03/2019    INR 0.9 03/16/2017    HGBA1C 5.9 (H) 09/13/2019       The 10-year ASCVD risk score (Jacques GERSON Jr., et al., 2013) is: 22.3%    Values used to calculate the score:      Age: 79 years      Sex: Female      Is Non- : Yes      Diabetic: No      Tobacco smoker: No      Systolic Blood Pressure: 136 mmHg      Is BP treated: Yes      HDL Cholesterol: 60 mg/dL      Total Cholesterol: 220 mg/dL    (Imaging have been independently reviewed)  DXA with normal BMD.    Assessment:       1. Essential hypertension    2. Mixed hyperlipidemia    3. Pre-diabetes    4. Severe obesity (BMI 35.0-35.9 with comorbidity)    5. Need for zoster vaccine          Plan:        Karis was seen today for hypertension.    Diagnoses and all orders for this visit:    Essential hypertension  Comments:  Uncontrolled d/t running out of meds. Cont Norvasc 10, HCTZ 25. Will restart valsartan 160. Nurse visit in 1 wk. Dicussed decreased ETOH, adding exercise.    Mixed hyperlipidemia  Comments:  Elevated ASCVD.  Continue Lipitor 40, aspirin 81.  Improved diet.    Pre-diabetes  Comments:  Diet controlled.  Will check A1c annually.    Severe obesity (BMI 35.0-35.9 with comorbidity)  Comments:  Worsened. Discussed decreasing ETOH, encouraged exercise, dietary changes.    Need for zoster vaccine  Comments:  Advised to obtain vaccine at Pharmacy.    Other orders  -     Cancel: valsartan (DIOVAN) 160 MG tablet; Take 1 tablet (160 mg total) by mouth once daily.         Side effects of medication(s) were discussed in detail and patient voiced understanding.  Patient will call back for any issues or complications.     RTC in 3 month(s) or sooner PRN for uncontrolled HTN.  Nurse visit for BP check in 1-2 weeks.

## 2020-03-23 DIAGNOSIS — E78.2 MIXED HYPERLIPIDEMIA: ICD-10-CM

## 2020-03-23 RX ORDER — ASPIRIN 81 MG/1
81 TABLET ORAL DAILY
Qty: 90 TABLET | Refills: 3 | Status: SHIPPED | OUTPATIENT
Start: 2020-03-23 | End: 2021-10-11

## 2020-03-23 NOTE — TELEPHONE ENCOUNTER
----- Message from Ky Turner sent at 3/23/2020  8:13 AM CDT -----  Contact: RAYA MCPHERSON [[id number]]  Can the clinic reply in [facility]:      Please refill the medication(s) listed below. Please call the patient when the prescription(s) is ready for  at the phone number    Medication #1:aspirin (ECOTRIN) 81 MG EC tablet    Medication #2:      Preferred Pharmacy:[facility]

## 2020-04-14 ENCOUNTER — TELEPHONE (OUTPATIENT)
Dept: INTERNAL MEDICINE | Facility: CLINIC | Age: 80
End: 2020-04-14

## 2020-04-14 ENCOUNTER — OFFICE VISIT (OUTPATIENT)
Dept: URGENT CARE | Facility: CLINIC | Age: 80
End: 2020-04-14
Payer: MEDICARE

## 2020-04-14 VITALS
TEMPERATURE: 99 F | HEART RATE: 89 BPM | HEIGHT: 66 IN | WEIGHT: 168 LBS | BODY MASS INDEX: 27 KG/M2 | OXYGEN SATURATION: 98 %
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
## 2020-04-14 DIAGNOSIS — L03.115 CELLULITIS OF RIGHT LOWER EXTREMITY: ICD-10-CM

## 2020-04-14 PROCEDURE — 99214 PR OFFICE/OUTPT VISIT, EST, LEVL IV, 30-39 MIN: ICD-10-PCS | Mod: S$GLB,,, | Performed by: FAMILY MEDICINE

## 2020-04-14 PROCEDURE — 99214 OFFICE O/P EST MOD 30 MIN: CPT | Mod: S$GLB,,, | Performed by: FAMILY MEDICINE

## 2020-04-14 RX ORDER — SULFAMETHOXAZOLE AND TRIMETHOPRIM 800; 160 MG/1; MG/1
1 TABLET ORAL 2 TIMES DAILY
Qty: 14 TABLET | Refills: 0 | Status: SHIPPED | OUTPATIENT
Start: 2020-04-14 | End: 2020-04-21

## 2020-04-14 RX ORDER — MUPIROCIN 20 MG/G
OINTMENT TOPICAL
Qty: 22 G | Refills: 0 | Status: SHIPPED | OUTPATIENT
Start: 2020-04-14 | End: 2020-04-24 | Stop reason: SDUPTHER
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
## 2020-04-24 ENCOUNTER — OFFICE VISIT (OUTPATIENT)
Dept: URGENT CARE | Facility: CLINIC | Age: 80
End: 2020-04-24
Payer: MEDICARE

## 2020-04-24 VITALS
HEART RATE: 81 BPM | HEIGHT: 66 IN | WEIGHT: 168 LBS | OXYGEN SATURATION: 97 % | TEMPERATURE: 98 F | BODY MASS INDEX: 27 KG/M2

## 2020-04-24 DIAGNOSIS — L03.115 CELLULITIS OF RIGHT LOWER EXTREMITY: ICD-10-CM

## 2020-04-24 DIAGNOSIS — S80.811S: Primary | ICD-10-CM

## 2020-04-24 PROCEDURE — 99499 NO LOS: ICD-10-PCS | Mod: S$GLB,,, | Performed by: EMERGENCY MEDICINE

## 2020-04-24 PROCEDURE — 99499 UNLISTED E&M SERVICE: CPT | Mod: S$GLB,,, | Performed by: EMERGENCY MEDICINE

## 2020-04-24 RX ORDER — SULFAMETHOXAZOLE AND TRIMETHOPRIM 800; 160 MG/1; MG/1
1 TABLET ORAL 2 TIMES DAILY
Qty: 10 TABLET | Refills: 0 | Status: SHIPPED | OUTPATIENT
Start: 2020-04-24 | End: 2020-04-29

## 2020-04-24 RX ORDER — MUPIROCIN 20 MG/G
OINTMENT TOPICAL
Qty: 22 G | Refills: 1 | Status: SHIPPED | OUTPATIENT
Start: 2020-04-24 | End: 2020-12-18

## 2020-04-24 NOTE — PATIENT INSTRUCTIONS
BACTROBAN OINTMENT RX  BACTRIM DS-BE SURE TO TAKE AS PRESCRIBED  TYLENOL FOR PAIN  ELEVATE LEG  ANTIBACTERIAL SOAP AND WATER LIKE DIAL  SEE ABRASION SHEET  SEE CELLULITIS SHEET  RETURN FOR ANY CONCERNS OR PROBLEMS  TETANUS ALREADY UP TO DATE          Abrasions  Abrasions are skin scrapes. Their treatment depends on how large and deep the abrasion is.  Home care  You may be prescribed an antibiotic cream or ointment to apply to the wound. This helps prevent infection. Follow instructions when using this medicine.  General care  · To care for the abrasion, do the following each day for as long as directed by your healthcare provider.  ¨ If you were given a bandage, change it once a day. If your bandage sticks to the wound, soak it in warm water until it loosens.  ¨ Wash the area with soap and warm water. You may do this in a sink or under a tub faucet or shower. Rinse off the soap. Then pat the area dry with a clean towel.  ¨ If antibiotic ointment or cream was prescribed, reapply it to the wound as directed. Cover the wound with a fresh nonstick bandage. If the bandage becomes wet or dirty, change it as soon as possible.  ¨ Some antibiotic ointments or cream can cause an allergic reaction or dermatitis. This may cause redness, itching and or hives. If this occurs, stop using the ointment immediately and wash off any remaining ointment. You may need to take some allergy medicine to relieve symptoms.  · You may use acetaminophen or ibuprofen to control pain unless another pain medicine was prescribed. Talk with your healthcare provider before using these medicines if you have chronic liver or kidney disease or ever had a stomach ulcer or GI bleeding. Dont use ibuprofen in children younger than six months old.  · Most skin wounds heal within 10 days. But an infection may occur even with treatment. So its important to watch the wound for signs of infection as listed below.  Follow-up care  Follow up with your  healthcare provider, or as advised.  When to seek medical advice  Call your healthcare provider right away if any of these occur:  · Fever of 100.4ºF (38ºC) or higher, or as directed by your healthcare provider  · Increasing pain, redness, swelling, or drainage from the wound  · Bleeding from the wound that does not stop after a few minutes of steady, firm pressure  · Decreased ability to move any body part near the wound  Date Last Reviewed: 3/3/2017  © 7785-6713 [employer]. [address] [place] [address]. All rights reserved. This information is not intended as a substitute for professional medical care. Always follow your healthcare professional's instructions.        Discharge Instructions for Cellulitis  You have been diagnosed with cellulitis. This is an infection in the deepest layer of the skin. In some cases, the infection also affects the muscle. Cellulitis is caused by bacteria. The bacteria can enter the body through broken skin. This can happen with a cut, scratch, animal bite, or an insect bite that has been scratched. You may have been treated in the hospital with antibiotics and fluids. You will likely be given a prescription for antibiotics to take at home. This sheet will help you take care of yourself at home.  Home care  When you are home:  · Take the prescribed antibiotic medicine you are given as directed until it is gone. Take it even if you feel better. It treats the infection and stops it from returning. Not taking all the medicine can make future infections hard to treat.  · Keep the infected area clean.  · When possible, raise the infected area above the level of your heart. This helps keep swelling down.  · Talk with your healthcare provider if you are in pain. Ask what kind of over-the-counter medicine you can take for pain.  · Apply clean bandages as advised.  · Take your temperature once a day for a week.  · Wash your hands often to prevent spreading the  infection.  In the future, wash your hands before and after you touch cuts, scratches, or bandages. This will help prevent infection.   When to call your healthcare provider  Call your healthcare provider immediately if you have any of the following:  · Difficulty or pain when moving the joints above or below the infected area  · Discharge or pus draining from the area  · Fever of 100.4°F (38°C) or higher, or as directed by your healthcare provider  · Pain that gets worse in or around the infected   · Redness that gets worse in or around the infected area, particularly if the area of redness expands to a wider area  · Shaking chills  · Swelling of the infected area  · Vomiting   Date Last Reviewed: 8/1/2016  © 4019-0511 [employer]. [address] [place] [address], [place] [address]. All rights reserved. This information is not intended as a substitute for professional medical care. Always follow your healthcare professional's instructions.
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
## 2020-04-30 ENCOUNTER — TELEPHONE (OUTPATIENT)
Dept: ADMINISTRATIVE | Facility: HOSPITAL | Age: 80
End: 2020-04-30

## 2020-04-30 NOTE — TELEPHONE ENCOUNTER
The patient was phoned about her upcoming appointment. She states that she wants to keep her appointment because she has not seen her since Feb and need to talk to her about some current issues.    Marva RAZA  Clinical Care Coordinator  Internal Medicine  [facility]  [phone]

## 2020-05-04 PROBLEM — L03.115 CELLULITIS OF RIGHT LEG: Status: ACTIVE | Noted: 2020-05-04

## 2020-05-26 ENCOUNTER — TELEPHONE (OUTPATIENT)
Dept: INTERNAL MEDICINE | Facility: CLINIC | Age: 80
End: 2020-05-26

## 2020-05-26 NOTE — TELEPHONE ENCOUNTER
Patient called requesting to be seen by her doctor, when was told her doctor doesn't have any appointments available she declined being seen by another doctor. Patient was advise to go to the ED for her shoulder problem.

## 2020-05-26 NOTE — TELEPHONE ENCOUNTER
----- Message from Diana Caraballo sent at 5/26/2020  9:36 AM CDT -----  Contact: RAYA MCPHERSON [[id number]]  Who called:RAYA MCPHERSON [[id number]]    What is the request in detail: Patient is requesting a call back. She states she is experiencing radiating left shoulder pain for the past two weeks. She states she feels like it is spreading to her left back shoulder and the left side of her neck. She would like to know if she can be squeezed in today or tomorrow. There were no available appointments until August, and she would rather not see another provider. She also feels it cannot wait until her 6/10 appt.    Please advise.    Can the clinic reply by [facility]? No    Best call back number: [phone]    Additional Information: N/A

## 2020-06-10 ENCOUNTER — OFFICE VISIT (OUTPATIENT)
Dept: INTERNAL MEDICINE | Facility: CLINIC | Age: 80
End: 2020-06-10
Payer: MEDICARE

## 2020-06-10 VITALS
WEIGHT: 171.94 LBS | DIASTOLIC BLOOD PRESSURE: 70 MMHG | HEART RATE: 78 BPM | BODY MASS INDEX: 27.63 KG/M2 | OXYGEN SATURATION: 97 % | SYSTOLIC BLOOD PRESSURE: 132 MMHG | HEIGHT: 66 IN

## 2020-06-10 DIAGNOSIS — E78.2 MIXED HYPERLIPIDEMIA: ICD-10-CM

## 2020-06-10 DIAGNOSIS — R73.03 PRE-DIABETES: ICD-10-CM

## 2020-06-10 DIAGNOSIS — Z00.00 HEALTHCARE MAINTENANCE: ICD-10-CM

## 2020-06-10 DIAGNOSIS — G89.29 CHRONIC LEFT SHOULDER PAIN: Primary | ICD-10-CM

## 2020-06-10 DIAGNOSIS — M25.512 CHRONIC LEFT SHOULDER PAIN: Primary | ICD-10-CM

## 2020-06-10 DIAGNOSIS — I10 ESSENTIAL HYPERTENSION: ICD-10-CM

## 2020-06-10 DIAGNOSIS — E66.3 OVERWEIGHT (BMI 25.0-29.9): ICD-10-CM

## 2020-06-10 PROBLEM — L03.115 CELLULITIS OF RIGHT LEG: Status: RESOLVED | Noted: 2020-05-04 | Resolved: 2020-06-10

## 2020-06-10 PROCEDURE — 1101F PT FALLS ASSESS-DOCD LE1/YR: CPT | Mod: CPTII,S$GLB,, | Performed by: INTERNAL MEDICINE

## 2020-06-10 PROCEDURE — 99215 PR OFFICE/OUTPT VISIT, EST, LEVL V, 40-54 MIN: ICD-10-PCS | Mod: S$GLB,,, | Performed by: INTERNAL MEDICINE

## 2020-06-10 PROCEDURE — 99999 PR PBB SHADOW E&M-EST. PATIENT-LVL III: CPT | Mod: PBBFAC,,, | Performed by: INTERNAL MEDICINE

## 2020-06-10 PROCEDURE — 3075F PR MOST RECENT SYSTOLIC BLOOD PRESS GE 130-139MM HG: ICD-10-PCS | Mod: CPTII,S$GLB,, | Performed by: INTERNAL MEDICINE

## 2020-06-10 PROCEDURE — 1159F PR MEDICATION LIST DOCUMENTED IN MEDICAL RECORD: ICD-10-PCS | Mod: S$GLB,,, | Performed by: INTERNAL MEDICINE

## 2020-06-10 PROCEDURE — 3075F SYST BP GE 130 - 139MM HG: CPT | Mod: CPTII,S$GLB,, | Performed by: INTERNAL MEDICINE

## 2020-06-10 PROCEDURE — 3078F PR MOST RECENT DIASTOLIC BLOOD PRESSURE < 80 MM HG: ICD-10-PCS | Mod: CPTII,S$GLB,, | Performed by: INTERNAL MEDICINE

## 2020-06-10 PROCEDURE — 1159F MED LIST DOCD IN RCRD: CPT | Mod: S$GLB,,, | Performed by: INTERNAL MEDICINE

## 2020-06-10 PROCEDURE — 99999 PR PBB SHADOW E&M-EST. PATIENT-LVL III: ICD-10-PCS | Mod: PBBFAC,,, | Performed by: INTERNAL MEDICINE

## 2020-06-10 PROCEDURE — 3078F DIAST BP <80 MM HG: CPT | Mod: CPTII,S$GLB,, | Performed by: INTERNAL MEDICINE

## 2020-06-10 PROCEDURE — 99215 OFFICE O/P EST HI 40 MIN: CPT | Mod: S$GLB,,, | Performed by: INTERNAL MEDICINE

## 2020-06-10 PROCEDURE — 1126F AMNT PAIN NOTED NONE PRSNT: CPT | Mod: S$GLB,,, | Performed by: INTERNAL MEDICINE

## 2020-06-10 PROCEDURE — 1126F PR PAIN SEVERITY QUANTIFIED, NO PAIN PRESENT: ICD-10-PCS | Mod: S$GLB,,, | Performed by: INTERNAL MEDICINE

## 2020-06-10 PROCEDURE — 1101F PR PT FALLS ASSESS DOC 0-1 FALLS W/OUT INJ PAST YR: ICD-10-PCS | Mod: CPTII,S$GLB,, | Performed by: INTERNAL MEDICINE

## 2020-06-10 RX ORDER — VALSARTAN 160 MG/1
160 TABLET ORAL DAILY
Qty: 90 TABLET | Refills: 3 | Status: SHIPPED | OUTPATIENT
Start: 2020-06-10 | End: 2021-05-26 | Stop reason: SDUPTHER

## 2020-06-10 RX ORDER — LIDOCAINE 30 MG/G
1 CREAM TOPICAL 3 TIMES DAILY PRN
Qty: 28.35 G | Refills: 5 | Status: SHIPPED | OUTPATIENT
Start: 2020-06-10 | End: 2020-12-18

## 2020-06-10 RX ORDER — HYDROCHLOROTHIAZIDE 25 MG/1
25 TABLET ORAL DAILY
Qty: 90 TABLET | Refills: 3 | Status: SHIPPED | OUTPATIENT
Start: 2020-06-10 | End: 2021-05-26 | Stop reason: SDUPTHER

## 2020-06-10 RX ORDER — AMLODIPINE BESYLATE 10 MG/1
10 TABLET ORAL DAILY
Qty: 90 TABLET | Refills: 3 | Status: SHIPPED | OUTPATIENT
Start: 2020-06-10 | End: 2021-05-26 | Stop reason: SDUPTHER
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
## 2020-06-22 DIAGNOSIS — E78.2 MIXED HYPERLIPIDEMIA: ICD-10-CM

## 2020-06-22 RX ORDER — ATORVASTATIN CALCIUM 40 MG/1
40 TABLET, FILM COATED ORAL DAILY
Qty: 90 TABLET | Refills: 3 | Status: SHIPPED | OUTPATIENT
Start: 2020-06-22 | End: 2021-05-26 | Stop reason: SDUPTHER

## 2020-06-22 NOTE — TELEPHONE ENCOUNTER
----- Message from Felipe Nix sent at 6/22/2020 12:54 PM CDT -----  Regarding: med refill  Type: RX Refill Request    Who Called: Karis     Refill or New Rx:refill     RX Name and Strength: aspirin (ECOTRIN) 81 MG EC tablet, atorvastatin (LIPITOR) 40 MG tablet    Is this a 30 day or 90 day Rx:30 day     Preferred Pharmacy with phone number: [facility]    Would the patient rather a call back or a response via [facility]? Call back     Best Call Back Number: [phone]

## 2020-07-10 ENCOUNTER — TELEPHONE (OUTPATIENT)
Dept: INTERNAL MEDICINE | Facility: CLINIC | Age: 80
End: 2020-07-10

## 2020-07-10 NOTE — TELEPHONE ENCOUNTER
----- Message from Dano Nix sent at 7/10/2020 12:36 PM CDT -----  Regarding: Pt Advice  Contact: RAYA MCPHERSON [[id number]]  Name of Who is Calling: RAYA MCPHERSON [[id number]]      What is the request in detail: Would like to speak with staff in regards to knowing if she doing her blood work in July would be too soon. Please advise      Can the clinic reply by [facility]: no      What Number to Call Back if not in [facility]: [phone]

## 2020-07-20 ENCOUNTER — HOSPITAL ENCOUNTER (EMERGENCY)
Facility: OTHER | Age: 80
Discharge: HOME | End: 2020-07-20
Attending: EMERGENCY MEDICINE
Payer: MEDICARE

## 2020-07-20 VITALS
WEIGHT: 171 LBS | TEMPERATURE: 99 F | SYSTOLIC BLOOD PRESSURE: 139 MMHG | DIASTOLIC BLOOD PRESSURE: 73 MMHG | HEIGHT: 66 IN | HEART RATE: 87 BPM | BODY MASS INDEX: 27.48 KG/M2 | OXYGEN SATURATION: 97 % | RESPIRATION RATE: 20 BRPM

## 2020-07-20 DIAGNOSIS — R53.83 FATIGUE: ICD-10-CM

## 2020-07-20 DIAGNOSIS — R63.0 DECREASED APPETITE: Primary | ICD-10-CM

## 2020-07-20 LAB
ALBUMIN SERPL BCP-MCNC: 3.7 G/DL (ref 3.5–5.2)
ALP SERPL-CCNC: 93 U/L (ref 55–135)
ALT SERPL W/O P-5'-P-CCNC: 24 U/L (ref 10–44)
ANION GAP SERPL CALC-SCNC: 15 MMOL/L (ref 8–16)
AST SERPL-CCNC: 29 U/L (ref 10–40)
BASOPHILS # BLD AUTO: 0.01 K/UL (ref 0–0.2)
BASOPHILS NFR BLD: 0.2 % (ref 0–1.9)
BILIRUB SERPL-MCNC: 1.1 MG/DL (ref 0.1–1)
BNP SERPL-MCNC: 16 PG/ML (ref 0–99)
BUN SERPL-MCNC: 20 MG/DL (ref 8–23)
CALCIUM SERPL-MCNC: 9.2 MG/DL (ref 8.7–10.5)
CHLORIDE SERPL-SCNC: 101 MMOL/L (ref 95–110)
CO2 SERPL-SCNC: 24 MMOL/L (ref 23–29)
CREAT SERPL-MCNC: 1.1 MG/DL (ref 0.5–1.4)
CRP SERPL-MCNC: 79 MG/L (ref 0–8.2)
DIFFERENTIAL METHOD: ABNORMAL
EOSINOPHIL # BLD AUTO: 0 K/UL (ref 0–0.5)
EOSINOPHIL NFR BLD: 0.5 % (ref 0–8)
ERYTHROCYTE [DISTWIDTH] IN BLOOD BY AUTOMATED COUNT: 12.4 % (ref 11.5–14.5)
EST. GFR  (AFRICAN AMERICAN): 55 ML/MIN/1.73 M^2
EST. GFR  (NON AFRICAN AMERICAN): 48 ML/MIN/1.73 M^2
GLUCOSE SERPL-MCNC: 108 MG/DL (ref 70–110)
HCT VFR BLD AUTO: 41.3 % (ref 37–48.5)
HGB BLD-MCNC: 13.8 G/DL (ref 12–16)
IMM GRANULOCYTES # BLD AUTO: 0.05 K/UL (ref 0–0.04)
IMM GRANULOCYTES NFR BLD AUTO: 0.9 % (ref 0–0.5)
LYMPHOCYTES # BLD AUTO: 0.9 K/UL (ref 1–4.8)
LYMPHOCYTES NFR BLD: 15.7 % (ref 18–48)
MCH RBC QN AUTO: 30.9 PG (ref 27–31)
MCHC RBC AUTO-ENTMCNC: 33.4 G/DL (ref 32–36)
MCV RBC AUTO: 92 FL (ref 82–98)
MONOCYTES # BLD AUTO: 0.4 K/UL (ref 0.3–1)
MONOCYTES NFR BLD: 7.2 % (ref 4–15)
NEUTROPHILS # BLD AUTO: 4.4 K/UL (ref 1.8–7.7)
NEUTROPHILS NFR BLD: 75.5 % (ref 38–73)
NRBC BLD-RTO: 0 /100 WBC
PLATELET # BLD AUTO: 212 K/UL (ref 150–350)
PMV BLD AUTO: 9.6 FL (ref 9.2–12.9)
POTASSIUM SERPL-SCNC: 3.5 MMOL/L (ref 3.5–5.1)
PROT SERPL-MCNC: 7.7 G/DL (ref 6–8.4)
RBC # BLD AUTO: 4.47 M/UL (ref 4–5.4)
SARS-COV-2 RDRP RESP QL NAA+PROBE: NEGATIVE
SODIUM SERPL-SCNC: 140 MMOL/L (ref 136–145)
TROPONIN I SERPL DL<=0.01 NG/ML-MCNC: 0.03 NG/ML (ref 0–0.03)
TSH SERPL DL<=0.005 MIU/L-ACNC: 1.26 UIU/ML (ref 0.4–4)
WBC # BLD AUTO: 5.81 K/UL (ref 3.9–12.7)

## 2020-07-20 PROCEDURE — 93010 ELECTROCARDIOGRAM REPORT: CPT | Mod: ,,, | Performed by: INTERNAL MEDICINE

## 2020-07-20 PROCEDURE — 93010 EKG 12-LEAD: ICD-10-PCS | Mod: ,,, | Performed by: INTERNAL MEDICINE

## 2020-07-20 PROCEDURE — 36415 COLL VENOUS BLD VENIPUNCTURE: CPT

## 2020-07-20 PROCEDURE — 94660 CPAP INITIATION&MGMT: CPT

## 2020-07-20 PROCEDURE — 25000003 PHARM REV CODE 250: Performed by: EMERGENCY MEDICINE

## 2020-07-20 PROCEDURE — U0002 COVID-19 LAB TEST NON-CDC: HCPCS

## 2020-07-20 PROCEDURE — 27000190 HC CPAP FULL FACE MASK W/VALVE

## 2020-07-20 PROCEDURE — 99285 EMERGENCY DEPT VISIT HI MDM: CPT | Mod: 25

## 2020-07-20 PROCEDURE — 99900035 HC TECH TIME PER 15 MIN (STAT)

## 2020-07-20 PROCEDURE — 93005 ELECTROCARDIOGRAM TRACING: CPT

## 2020-07-20 PROCEDURE — 27000221 HC OXYGEN, UP TO 24 HOURS

## 2020-07-20 PROCEDURE — 83880 ASSAY OF NATRIURETIC PEPTIDE: CPT

## 2020-07-20 PROCEDURE — 84443 ASSAY THYROID STIM HORMONE: CPT

## 2020-07-20 PROCEDURE — 84484 ASSAY OF TROPONIN QUANT: CPT

## 2020-07-20 PROCEDURE — 80053 COMPREHEN METABOLIC PANEL: CPT

## 2020-07-20 PROCEDURE — 86140 C-REACTIVE PROTEIN: CPT

## 2020-07-20 PROCEDURE — 85025 COMPLETE CBC W/AUTO DIFF WBC: CPT

## 2020-07-20 RX ORDER — AZITHROMYCIN 250 MG/1
250 TABLET, FILM COATED ORAL DAILY
Qty: 6 TABLET | Refills: 0 | Status: SHIPPED | OUTPATIENT
Start: 2020-07-20 | End: 2020-12-18

## 2020-07-20 RX ADMIN — SODIUM CHLORIDE 1000 ML: 0.9 INJECTION, SOLUTION INTRAVENOUS at 11:07

## 2020-07-20 NOTE — ED NOTES
Patient received on CPAP via EMT,placed on BIPAP 12/6 50% tolerating well. Will continue to monitor. Delete error

## 2020-07-20 NOTE — ED TRIAGE NOTES
Pt AAOx3, presented to the ED with weakness and scratchy throat x 4 days. Pt denies SOB, chest pain, N/V/D, dizziness, pain with urination and fever/chills.

## 2020-07-20 NOTE — ED PROVIDER NOTES
"Encounter Date: 7/20/2020    SCRIBE #1 NOTE: IViktoriya, am scribing for, and in the presence of, Dr. Ramirez.       History     Chief Complaint   Patient presents with    Fatigue     x 3 days. Pt also reporting a "bad taste" in her mouth and flu like symptoms.     Time seen by provider: 10:43 AM    This is a 79 y.o. female, with a hx of HTN and gallstones, who presents with complaint of fatigue for the last 4 days. Pt states she is usually very active, but has not been having the energy to do her usual activities. Pt reports associated decreased appetite and a scratchy throat. She denies fever, nausea, vomiting, diarrhea, cough, chest pain, SOB, or dysuria. No abdominal pain similar to her gallstone attacks.     The history is provided by the patient and medical records.     Review of patient's allergies indicates:  No Known Allergies  Past Medical History:   Diagnosis Date    Gallstone pancreatitis 09/2016    Gallstones     Hyperlipidemia     Hypertension      Past Surgical History:   Procedure Laterality Date    HYSTERECTOMY       Family History   Problem Relation Age of Onset    Heart attack Father     Heart disease Sister     No Known Problems Mother      Social History     Tobacco Use    Smoking status: Never Smoker    Smokeless tobacco: Never Used   Substance Use Topics    Alcohol use: Yes     Comment: socially    Drug use: No     Review of Systems   Constitutional: Positive for appetite change (decreased) and fatigue. Negative for chills and fever.   HENT: Positive for sore throat (scratchy). Negative for congestion and rhinorrhea.    Eyes: Negative for visual disturbance.   Respiratory: Negative for cough and shortness of breath.    Cardiovascular: Negative for chest pain.   Gastrointestinal: Negative for abdominal pain, diarrhea, nausea and vomiting.   Genitourinary: Negative for dysuria.   Musculoskeletal: Negative for back pain.   Skin: Negative for rash.   Neurological: " Negative for dizziness, weakness and light-headedness.   Psychiatric/Behavioral: Negative for confusion.       Physical Exam     Initial Vitals [07/20/20 1013]   BP Pulse Resp Temp SpO2   (!) 162/76 86 16 98.7 °F (37.1 °C) 96 %      MAP       --         Physical Exam    Nursing note and vitals reviewed.  Constitutional: She appears well-developed and well-nourished. She is not diaphoretic. No distress.   HENT:   Head: Normocephalic and atraumatic.   Mouth/Throat: Mucous membranes are dry.   Eyes: Conjunctivae and EOM are normal. Pupils are equal, round, and reactive to light.   Neck: Normal range of motion. Neck supple.   Cardiovascular: Normal rate, regular rhythm and normal heart sounds.   No murmur heard.  Pulmonary/Chest: Breath sounds normal. No respiratory distress. She has no wheezes. She has no rhonchi. She has no rales.   Abdominal: Soft. Bowel sounds are normal. She exhibits no distension. There is no abdominal tenderness. There is no rebound and no guarding.   Musculoskeletal: Normal range of motion. No tenderness or edema.   Neurological: She is alert and oriented to person, place, and time. She has normal strength. No cranial nerve deficit.   Skin: Skin is warm and dry.         ED Course   Procedures  Labs Reviewed   CBC W/ AUTO DIFFERENTIAL - Abnormal; Notable for the following components:       Result Value    Immature Granulocytes 0.9 (*)     Immature Grans (Abs) 0.05 (*)     Lymph # 0.9 (*)     Gran% 75.5 (*)     Lymph% 15.7 (*)     All other components within normal limits   COMPREHENSIVE METABOLIC PANEL - Abnormal; Notable for the following components:    Total Bilirubin 1.1 (*)     eGFR if  55 (*)     eGFR if non  48 (*)     All other components within normal limits   C-REACTIVE PROTEIN - Abnormal; Notable for the following components:    CRP 79.0 (*)     All other components within normal limits   TSH   TROPONIN I   SARS-COV-2 RNA AMPLIFICATION, QUAL   B-TYPE  NATRIURETIC PEPTIDE   C-REACTIVE PROTEIN   B-TYPE NATRIURETIC PEPTIDE     EKG Readings: (Independently Interpreted)   Normal Sinus Rhythm at a rate of 73. T wave inversion on previous normalized. No acute ischemia.     ECG Results          EKG 12-lead (Final result)  Result time 07/20/20 16:01:30    Final result by Interface, Lab In [facility] (07/20/20 16:01:30)                 Narrative:    Test Reason : R53.83,    Vent. Rate : 073 BPM     Atrial Rate : 073 BPM     P-R Int : 222 ms          QRS Dur : 096 ms      QT Int : 374 ms       P-R-T Axes : 060 002 039 degrees     QTc Int : 412 ms    Sinus rhythm with 1st degree A-V block  Nonspecific T wave abnormality  Abnormal ECG    Confirmed by Coco FARIA, Baltazar ([id number]) on 7/20/2020 4:01:14 PM    Referred By: ROXIE   SELF           Confirmed By:Baltazar Sepulveda MD                            Imaging Results           X-Ray Chest AP Portable (Final result)  Result time 07/20/20 12:11:17    Final result by Srikanth Bowles MD (07/20/20 12:11:17)                 Impression:      Findings may represent mild pulmonary edema/CHF pattern with multifocal inflammatory or infectious pneumonia from atypical bacterial or viral etiology not excluded.  Consider short-term follow-up chest radiography after therapy to ensure resolution.    This report was flagged in Epic as abnormal.      Electronically signed by: Srikanth Bowles MD  Date:    07/20/2020  Time:    12:11             Narrative:    EXAMINATION:  XR CHEST AP PORTABLE    CLINICAL HISTORY:  Other fatigue    TECHNIQUE:  Single frontal view of the chest was performed.    COMPARISON:  Chest radiograph 09/03/2019    FINDINGS:  Resolution is somewhat limited by body habitus with underpenetration.  Cardiomediastinal silhouette is midline and prominent similar to prior.  Bibasilar bandlike opacities suggesting platelike scarring versus atelectasis, unchanged.  The lungs are symmetrically well expanded with subtle patchy mixed  alveolar and ground-glass I opacities with mild diffuse nonspecific interstitial coarsening with a perihilar and basilar predominance.  No pleural effusion or pneumothorax.  No acute osseous process seen.  PA and lateral views can be obtained.                                 Medical Decision Making:   History:   Old Medical Records: I decided to obtain old medical records.  Initial Assessment:       79-year-old female with history of HTN, gallstones presents for evaluation of fatigue and decreased appetite for the past 4 days.  Patient is very active at baseline, and has not had any similar previous episodes.  She  denies any other associated symptoms except for scratchy throat.  No cough or SOB, chest pain, fevers, nausea, diarrhea, dysuria.  No abdominal pain similar to pain from gallstones.  On exam she has signs of mild dehydration but no other concerning exam findings, she is afebrile with slightly elevated BP and normal pulse.  Broad differential includes ACS, COVID-19, hypothyroid.  Will also evaluate for anemia, ANDREA, electrolyte abnormality.      Initial labs with normal CBC and TSH, no acute findings on CMP, and negative troponin.  COVID-19 test negative, but chest x-ray shows increased interstitial markings concerning for CHF versus multifocal inflammatory or infectious pneumonia.  Patient with no known history of CHF, and is laying flat with normal O2 sat and no SOB, and normal BNP.  She has no cough or fever, which I would expect if she had multifocal bacterial pneumonia.  This makes false negative COVID-19 test more concerning, especially given elevated CRP.  While in ED patient felt better with IVF and was tolerating liquids and solids without difficulty.  She ambulated in her room with no desaturation and maintained O2 sats above 95% on room air.  Patient feels comfortable with discharge and outpatient management, and will try to increase p.o. hydration.  Given concern for pneumonia will Rx Z-Anselmo  empirically, though this may be a viral pneumonia such as COVID-19.  She is advised to return immediately to the ED for any SOB or any other new symptoms, and will arrange close PCP follow-up to ensure improvement.      Independently Interpreted Test(s):   I have ordered and independently interpreted EKG Reading(s) - see prior notes  Clinical Tests:   Lab Tests: Ordered and Reviewed  Radiological Study: Ordered and Reviewed  Medical Tests: Ordered and Reviewed            Scribe Attestation:   Scribe #1: I performed the above scribed service and the documentation accurately describes the services I performed. I attest to the accuracy of the note.    Attending Attestation:           Physician Attestation for Scribe:  Physician Attestation Statement for Scribe #1: I, Dr. Ramirez, reviewed documentation, as scribed by Viktoriya Wilde in my presence, and it is both accurate and complete.                               Clinical Impression:     1. Decreased appetite    2. Fatigue              ED Disposition Condition    Discharge Stable        ED Prescriptions     Medication Sig Dispense Start Date End Date Auth. Provider    azithromycin (Z-NAY) 250 MG tablet Take 1 tablet (250 mg total) by mouth once daily. Take first 2 tablets together, then 1 every day until finished. 6 tablet 7/20/2020  Luigi Ramirez MD        Follow-up Information     Follow up With Specialties Details Why Contact Info    Vandana Smith MD Internal Medicine Schedule an appointment as soon as possible for a visit in 3 days  [address]  [address]  [place] [address]  [phone]                                       Luigi Ramirez MD  07/23/20 0021

## 2020-07-20 NOTE — Clinical Note
"Karis "Santosh Briceño was seen and treated in our emergency department on 7/20/2020.     COVID-19 is present in our communities across the state. There is limited testing for COVID at this time, so not all patients can be tested. In this situation, your employee meets the following criteria:    Karis Briceño has met the criteria for COVID-19 testing based upon symptoms, travel, and/or potential exposure. The test has been completed and is pending results at this time. During this time the employee is not able to work and should be quarantined per the Centers for Disease Control timelines.     If you have any questions or concerns, or if I can be of further assistance, please do not hesitate to contact me.    Sincerely,             Luigi Ramirez MD"

## 2020-07-21 ENCOUNTER — TELEPHONE (OUTPATIENT)
Dept: PRIMARY CARE CLINIC | Facility: CLINIC | Age: 80
End: 2020-07-21

## 2020-07-21 NOTE — TELEPHONE ENCOUNTER
Attempted to call Ms. Nuñezmiguelina to schedule follow up appt. But no answer.  LVM to call the office.

## 2020-07-21 NOTE — TELEPHONE ENCOUNTER
----- Message from Chandni Raul sent at 7/21/2020 12:13 PM CDT -----  Contact: RAYA MCPHERSON [[id number]]  Type: Patient Call Back    Who called: RAYA MCPHERSON [[id number]]    What is the request in detail: Patient is requesting a call back. She would like to schedule a ED visit follow up appointment. She states that she was advised to follow up within 3 days.   Please advise.    Can the clinic reply by [facility]? No    Best call back number: [phone]    Additional Information: N/A

## 2020-07-22 NOTE — TELEPHONE ENCOUNTER
Spoke to the Pt   And there is no available in person visits and she states the ER rec. 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

## 2020-07-22 NOTE — TELEPHONE ENCOUNTER
----- Message from Santa Harman sent at 7/22/2020  1:52 PM CDT -----  Regarding: missed call  Type: Patient Call Back    Who called:Self    What is the request in detail:Patient returning missed call to office    Can the clinic reply by [facility]? NO    Would the patient rather a call back or a response via [facility]? Callback    Best call back number:[phone]

## 2020-07-23 ENCOUNTER — TELEPHONE (OUTPATIENT)
Dept: INTERNAL MEDICINE | Facility: CLINIC | Age: 80
End: 2020-07-23

## 2020-07-23 ENCOUNTER — OFFICE VISIT (OUTPATIENT)
Dept: INTERNAL MEDICINE | Facility: CLINIC | Age: 80
End: 2020-07-23
Attending: INTERNAL MEDICINE
Payer: MEDICARE

## 2020-07-23 DIAGNOSIS — R93.89 ABNORMAL CHEST X-RAY: Primary | ICD-10-CM

## 2020-07-23 PROCEDURE — 99442 PR PHYSICIAN TELEPHONE EVALUATION 11-20 MIN: CPT | Mod: 95,,, | Performed by: INTERNAL MEDICINE

## 2020-07-23 PROCEDURE — 99442 PR PHYSICIAN TELEPHONE EVALUATION 11-20 MIN: ICD-10-PCS | Mod: 95,,, | Performed by: INTERNAL MEDICINE

## 2020-07-23 NOTE — TELEPHONE ENCOUNTER
Contacted patient and informed letter drafted and will sent out today. Would like it sent to [employer] [id number] [place], [place] [address]. Put in mail.

## 2020-07-23 NOTE — TELEPHONE ENCOUNTER
Spoke with patient and told her about her audio visit today she understands about her appointment .

## 2020-07-23 NOTE — PROGRESS NOTES
Subjective:   Patient ID: Karis Briceño is a 79 y.o. female  Chief complaint: No chief complaint on file.      HPI  Audio Only Telehealth Visit     The patient location is: home  The chief complaint leading to consultation is: ER follow up   Visit type: Virtual visit with audio only (telephone)  Total time spent with patient: 20 min     The reason for the audio only service rather than synchronous audio and video virtual visit was related to technical difficulties or patient preference/necessity.     Each patient to whom I provide medical services by telemedicine is:  (1) informed of the relationship between the physician and patient and the respective role of any other health care provider with respect to management of the patient; and (2) notified that they may decline to receive medical services by telemedicine and may withdraw from such care at any time. Patient verbally consented to receive this service via voice-only telephone call.       HPI:   79 F with hx of HTN, diastolic dysfunction, HLD, IFG presented to ER 7/20/2020 for fatigue and decreased appetite - covid test negative but cxr showed bilateral infiltrates rasing c/f for possible false negative covid result.   BNP was wnl, trop normal, tsh wnl    given azithro epmically and to complete this bailey   Feeling better overall but still gets tired in middle of day and rests  No fevers or chills or wheezing or sob   Mild post nasal drip but no cough   No nausea, vomiting or diarrhea - bryan PO   No orthopnea, ankle edema, cp or wheezing or sob     HTN:  Not checking bp at home - taking bp meds regularly     Assessment and plan:    Repeat cxr in 4 weeks for clearance   Clinically improved   Complete full course of zpack   rtc in 4 weeks with pcp after CXR and arrange annual labs prior to that appt as well    er and rtc prompts given   Cont to rest and stay well hydrated   Return to work letter drafted     This service was not originating from a related E/M service  provided within the previous 7 days nor will  to an E/M service or procedure within the next 24 hours or my soonest available appointment.  Prevailing standard of care was able to be met in this audio-only visit.

## 2020-07-29 ENCOUNTER — TELEPHONE (OUTPATIENT)
Dept: INTERNAL MEDICINE | Facility: CLINIC | Age: 80
End: 2020-07-29

## 2020-07-29 NOTE — TELEPHONE ENCOUNTER
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 Gong  She states it is due Monday   And she will drop it off tomorrow

## 2020-07-29 NOTE — TELEPHONE ENCOUNTER
----- Message from Monica Doan sent at 7/29/2020 11:46 AM CDT -----    Name of Who is Calling:RAYA MCPHERSON [[id number]]    What is the request in detail: Pt would like to come in to drop off paperwork she needs to signed to return to work. Patient would like to drop paperwork off tomorrow . Please contact to further discuss and advise     Can the clinic reply by [facility]:no     [facility] [phone]

## 2020-07-31 ENCOUNTER — TELEPHONE (OUTPATIENT)
Dept: INTERNAL MEDICINE | Facility: CLINIC | Age: 80
End: 2020-07-31

## 2020-07-31 NOTE — TELEPHONE ENCOUNTER
----- Message from Alexandra Gaming sent at 7/31/2020  9:34 AM CDT -----  Regarding: return  to work letter  Name of Who is Calling: RAYA MCPHERSON [[id number]]      What is the request in detail:  Patient is requesting a call back concerning  needing a paper filled out to she received a return to work letter but her employer needs a paperwork to be filled out        Can the clinic reply by [facility]: no      What [facility] [phone]

## 2020-07-31 NOTE — TELEPHONE ENCOUNTER
----- Message from Monica Doan sent at 7/31/2020  3:03 PM CDT -----  Type:  Patient Returning Call    Who Called: RAYA MCPHERSON [[id number]]    Who Left Message for Patient: Bess Haro LPN     Does the patient know what this is regarding?:returning a call , pt would like to know is paperwork ready      Best Call Back Number:[phone]    Additional Information:

## 2020-08-05 ENCOUNTER — TELEPHONE (OUTPATIENT)
Dept: INTERNAL MEDICINE | Facility: CLINIC | Age: 80
End: 2020-08-05

## 2020-08-05 NOTE — TELEPHONE ENCOUNTER
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

## 2020-08-05 NOTE — TELEPHONE ENCOUNTER
----- Message from Elizabeth Jones sent at 8/5/2020  1:01 PM CDT -----  Type: Patient Call Back    Who called: Self     What is the request in detail:patient would like to know if she can have a beer today she finished her antibiotics on 7/24.     Can the clinic reply by [facility]? No     Would the patient rather a call back or a response via [facility]?  Call     Best call back number:[phone] (home)

## 2020-09-24 ENCOUNTER — TELEPHONE (OUTPATIENT)
Dept: INTERNAL MEDICINE | Facility: CLINIC | Age: 80
End: 2020-09-24

## 2020-09-24 NOTE — TELEPHONE ENCOUNTER
----- Message from Monica Ventura sent at 9/24/2020 11:47 AM CDT -----  Regarding: Med refill  Contact: Karis  Type: RX Refill Request    Who Called: Karis    Refill or New Rx:Refill    RX Name and Strength:amLODIPine (NORVASC) 10 MG tablet 90 tablet   aspirin (ECOTRIN) 81 MG EC tablet 90 tablet   atorvastatin (LIPITOR) 40 MG tablet 90 tablet   hydroCHLOROthiazide (HYDRODIURIL) 25 MG tablet 90 tablet   valsartan (DIOVAN) 160 MG tablet 90 tablet       Preferred Pharmacy with phone number:[facility] [phone] (Phone)  [phone] (Fax)    Local or Mail Order:Local    Ordering Provider:Luis    Would the patient rather a call back or a response via [facility]? Call    Best Call Back Number:[phone]    Additional Information:appointment scheduled medication refill.

## 2020-09-24 NOTE — TELEPHONE ENCOUNTER
LVM informing patient that she has refills of the requested medications at her pharmacy and she would need to call them to refill them.

## 2020-12-08 ENCOUNTER — TELEPHONE (OUTPATIENT)
Dept: INTERNAL MEDICINE | Facility: CLINIC | Age: 80
End: 2020-12-08

## 2020-12-08 NOTE — TELEPHONE ENCOUNTER
Spoke to the Pt  Informed her calling to r/s due to office being closed on randa zhao  Offered appts  Pt was requesting appts after 3:30 pm and was informed the latest is 3:15 pm and can not find one that late    Pt confirmed to r/s randa churchill appt to 12/18 for 8am   She states she has to go to work/ after appt  states she will come in earlier to be roomed and ready to see PCP at 8am  To make it to work on time

## 2020-12-18 ENCOUNTER — OFFICE VISIT (OUTPATIENT)
Dept: INTERNAL MEDICINE | Facility: CLINIC | Age: 80
End: 2020-12-18
Payer: MEDICARE

## 2020-12-18 VITALS
WEIGHT: 180.31 LBS | OXYGEN SATURATION: 100 % | BODY MASS INDEX: 28.98 KG/M2 | DIASTOLIC BLOOD PRESSURE: 78 MMHG | HEIGHT: 66 IN | SYSTOLIC BLOOD PRESSURE: 132 MMHG | TEMPERATURE: 97 F | HEART RATE: 74 BPM

## 2020-12-18 DIAGNOSIS — J18.9 COMMUNITY ACQUIRED PNEUMONIA, UNSPECIFIED LATERALITY: ICD-10-CM

## 2020-12-18 DIAGNOSIS — Z23 NEED FOR PNEUMOCOCCAL VACCINE: ICD-10-CM

## 2020-12-18 DIAGNOSIS — Z23 NEED FOR ZOSTER VACCINE: ICD-10-CM

## 2020-12-18 DIAGNOSIS — E66.3 OVERWEIGHT (BMI 25.0-29.9): ICD-10-CM

## 2020-12-18 DIAGNOSIS — Z00.00 ANNUAL PHYSICAL EXAM: Primary | ICD-10-CM

## 2020-12-18 DIAGNOSIS — E78.2 MIXED HYPERLIPIDEMIA: ICD-10-CM

## 2020-12-18 DIAGNOSIS — M25.512 CHRONIC LEFT SHOULDER PAIN: ICD-10-CM

## 2020-12-18 DIAGNOSIS — R73.03 PRE-DIABETES: ICD-10-CM

## 2020-12-18 DIAGNOSIS — R94.4 DECREASED GFR: ICD-10-CM

## 2020-12-18 DIAGNOSIS — G89.29 CHRONIC LEFT SHOULDER PAIN: ICD-10-CM

## 2020-12-18 DIAGNOSIS — I10 ESSENTIAL HYPERTENSION: ICD-10-CM

## 2020-12-18 PROCEDURE — 3288F FALL RISK ASSESSMENT DOCD: CPT | Mod: CPTII,S$GLB,, | Performed by: INTERNAL MEDICINE

## 2020-12-18 PROCEDURE — 1101F PT FALLS ASSESS-DOCD LE1/YR: CPT | Mod: CPTII,S$GLB,, | Performed by: INTERNAL MEDICINE

## 2020-12-18 PROCEDURE — 99214 OFFICE O/P EST MOD 30 MIN: CPT | Mod: S$GLB,,, | Performed by: INTERNAL MEDICINE

## 2020-12-18 PROCEDURE — 99999 PR PBB SHADOW E&M-EST. PATIENT-LVL III: ICD-10-PCS | Mod: PBBFAC,,, | Performed by: INTERNAL MEDICINE

## 2020-12-18 PROCEDURE — 3075F SYST BP GE 130 - 139MM HG: CPT | Mod: CPTII,S$GLB,, | Performed by: INTERNAL MEDICINE

## 2020-12-18 PROCEDURE — 99999 PR PBB SHADOW E&M-EST. PATIENT-LVL III: CPT | Mod: PBBFAC,,, | Performed by: INTERNAL MEDICINE

## 2020-12-18 PROCEDURE — 99214 PR OFFICE/OUTPT VISIT, EST, LEVL IV, 30-39 MIN: ICD-10-PCS | Mod: S$GLB,,, | Performed by: INTERNAL MEDICINE

## 2020-12-18 PROCEDURE — 3075F PR MOST RECENT SYSTOLIC BLOOD PRESS GE 130-139MM HG: ICD-10-PCS | Mod: CPTII,S$GLB,, | Performed by: INTERNAL MEDICINE

## 2020-12-18 PROCEDURE — 3288F PR FALLS RISK ASSESSMENT DOCUMENTED: ICD-10-PCS | Mod: CPTII,S$GLB,, | Performed by: INTERNAL MEDICINE

## 2020-12-18 PROCEDURE — 3078F DIAST BP <80 MM HG: CPT | Mod: CPTII,S$GLB,, | Performed by: INTERNAL MEDICINE

## 2020-12-18 PROCEDURE — 1125F PR PAIN SEVERITY QUANTIFIED, PAIN PRESENT: ICD-10-PCS | Mod: S$GLB,,, | Performed by: INTERNAL MEDICINE

## 2020-12-18 PROCEDURE — 1101F PR PT FALLS ASSESS DOC 0-1 FALLS W/OUT INJ PAST YR: ICD-10-PCS | Mod: CPTII,S$GLB,, | Performed by: INTERNAL MEDICINE

## 2020-12-18 PROCEDURE — 3078F PR MOST RECENT DIASTOLIC BLOOD PRESSURE < 80 MM HG: ICD-10-PCS | Mod: CPTII,S$GLB,, | Performed by: INTERNAL MEDICINE

## 2020-12-18 PROCEDURE — 1125F AMNT PAIN NOTED PAIN PRSNT: CPT | Mod: S$GLB,,, | Performed by: INTERNAL MEDICINE

## 2020-12-18 RX ORDER — DICLOFENAC SODIUM 10 MG/G
2 GEL TOPICAL 4 TIMES DAILY PRN
Qty: 100 G | Refills: 11 | Status: SHIPPED | OUTPATIENT
Start: 2020-12-18

## 2020-12-18 RX ORDER — CYCLOBENZAPRINE HCL 5 MG
5 TABLET ORAL 3 TIMES DAILY PRN
Qty: 30 TABLET | Refills: 3 | Status: SHIPPED | OUTPATIENT
Start: 2020-12-18 | End: 2023-02-23
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
## 2020-12-21 ENCOUNTER — IMMUNIZATION (OUTPATIENT)
Dept: PHARMACY | Facility: CLINIC | Age: 80
End: 2020-12-21
Payer: MEDICARE

## 2020-12-29 ENCOUNTER — HOSPITAL ENCOUNTER (OUTPATIENT)
Dept: RADIOLOGY | Facility: OTHER | Age: 80
Discharge: HOME | End: 2020-12-29
Attending: INTERNAL MEDICINE
Payer: MEDICARE

## 2020-12-29 DIAGNOSIS — J18.9 COMMUNITY ACQUIRED PNEUMONIA, UNSPECIFIED LATERALITY: ICD-10-CM

## 2020-12-29 PROCEDURE — 71046 XR CHEST PA AND LATERAL: ICD-10-PCS | Mod: 26,,, | Performed by: RADIOLOGY

## 2020-12-29 PROCEDURE — 71046 X-RAY EXAM CHEST 2 VIEWS: CPT | Mod: 26,,, | Performed by: RADIOLOGY

## 2020-12-29 PROCEDURE — 71046 X-RAY EXAM CHEST 2 VIEWS: CPT | Mod: TC,FY

## 2020-12-31 ENCOUNTER — TELEPHONE (OUTPATIENT)
Dept: INTERNAL MEDICINE | Facility: CLINIC | Age: 80
End: 2020-12-31

## 2021-01-07 ENCOUNTER — TELEPHONE (OUTPATIENT)
Dept: INTERNAL MEDICINE | Facility: CLINIC | Age: 81
End: 2021-01-07

## 2021-01-12 ENCOUNTER — IMMUNIZATION (OUTPATIENT)
Dept: INTERNAL MEDICINE | Facility: CLINIC | Age: 81
End: 2021-01-12

## 2021-01-12 DIAGNOSIS — Z23 NEED FOR VACCINATION: ICD-10-CM

## 2021-01-12 PROCEDURE — 0001A COVID-19, MRNA, LNP-S, PF, 30 MCG/0.3 ML DOSE VACCINE: CPT | Mod: CV19,S$GLB,, | Performed by: INTERNAL MEDICINE

## 2021-01-12 PROCEDURE — 0001A COVID-19, MRNA, LNP-S, PF, 30 MCG/0.3 ML DOSE VACCINE: ICD-10-PCS | Mod: CV19,S$GLB,, | Performed by: INTERNAL MEDICINE

## 2021-01-12 PROCEDURE — 91300 COVID-19, MRNA, LNP-S, PF, 30 MCG/0.3 ML DOSE VACCINE: ICD-10-PCS | Mod: S$GLB,,, | Performed by: INTERNAL MEDICINE

## 2021-01-12 PROCEDURE — 91300 COVID-19, MRNA, LNP-S, PF, 30 MCG/0.3 ML DOSE VACCINE: CPT | Mod: S$GLB,,, | Performed by: INTERNAL MEDICINE

## 2021-01-28 NOTE — TELEPHONE ENCOUNTER
Attempted to return call to Ms. Briceño, but no answer.  LVM to call the office.    Complex Repair And Burow's Graft Text: The defect edges were debeveled with a #15 scalpel blade.  The primary defect was closed partially with a complex linear closure.  Given the location of the defect, shape of the defect, the proximity to free margins and the presence of a standing cone deformity a Burow's graft was deemed most appropriate to repair the remaining defect.  The graft was trimmed to fit the size of the remaining defect.  The graft was then placed in the primary defect, oriented appropriately, and sutured into place.

## 2021-01-29 ENCOUNTER — LAB VISIT (OUTPATIENT)
Dept: LAB | Facility: OTHER | Age: 81
End: 2021-01-29
Attending: INTERNAL MEDICINE
Payer: MEDICARE

## 2021-01-29 DIAGNOSIS — R73.03 PRE-DIABETES: ICD-10-CM

## 2021-01-29 DIAGNOSIS — E78.2 MIXED HYPERLIPIDEMIA: ICD-10-CM

## 2021-01-29 DIAGNOSIS — R94.4 DECREASED GFR: ICD-10-CM

## 2021-01-29 LAB
ALBUMIN SERPL BCP-MCNC: 4.2 G/DL (ref 3.5–5.2)
ALP SERPL-CCNC: 64 U/L (ref 55–135)
ALT SERPL W/O P-5'-P-CCNC: 18 U/L (ref 10–44)
ANION GAP SERPL CALC-SCNC: 11 MMOL/L (ref 8–16)
AST SERPL-CCNC: 24 U/L (ref 10–40)
BILIRUB SERPL-MCNC: 1.2 MG/DL (ref 0.1–1)
BUN SERPL-MCNC: 26 MG/DL (ref 8–23)
CALCIUM SERPL-MCNC: 9.2 MG/DL (ref 8.7–10.5)
CHLORIDE SERPL-SCNC: 103 MMOL/L (ref 95–110)
CHOLEST SERPL-MCNC: 149 MG/DL (ref 120–199)
CHOLEST/HDLC SERPL: 2.1 {RATIO} (ref 2–5)
CO2 SERPL-SCNC: 28 MMOL/L (ref 23–29)
CREAT SERPL-MCNC: 0.9 MG/DL (ref 0.5–1.4)
EST. GFR  (AFRICAN AMERICAN): >60 ML/MIN/1.73 M^2
EST. GFR  (NON AFRICAN AMERICAN): >60 ML/MIN/1.73 M^2
ESTIMATED AVG GLUCOSE: 123 MG/DL (ref 68–131)
GLUCOSE SERPL-MCNC: 140 MG/DL (ref 70–110)
HBA1C MFR BLD: 5.9 % (ref 4–5.6)
HDLC SERPL-MCNC: 71 MG/DL (ref 40–75)
HDLC SERPL: 47.7 % (ref 20–50)
LDLC SERPL CALC-MCNC: 61.4 MG/DL (ref 63–159)
NONHDLC SERPL-MCNC: 78 MG/DL
POTASSIUM SERPL-SCNC: 4 MMOL/L (ref 3.5–5.1)
PROT SERPL-MCNC: 7.4 G/DL (ref 6–8.4)
SODIUM SERPL-SCNC: 142 MMOL/L (ref 136–145)
TRIGL SERPL-MCNC: 83 MG/DL (ref 30–150)

## 2021-01-29 PROCEDURE — 36415 COLL VENOUS BLD VENIPUNCTURE: CPT

## 2021-01-29 PROCEDURE — 80053 COMPREHEN METABOLIC PANEL: CPT

## 2021-01-29 PROCEDURE — 80061 LIPID PANEL: CPT

## 2021-01-29 PROCEDURE — 83036 HEMOGLOBIN GLYCOSYLATED A1C: CPT

## 2021-02-02 ENCOUNTER — IMMUNIZATION (OUTPATIENT)
Dept: INTERNAL MEDICINE | Facility: CLINIC | Age: 81
End: 2021-02-02
Payer: MEDICARE

## 2021-02-02 DIAGNOSIS — Z23 NEED FOR VACCINATION: Primary | ICD-10-CM

## 2021-02-02 PROCEDURE — 0002A COVID-19, MRNA, LNP-S, PF, 30 MCG/0.3 ML DOSE VACCINE: CPT | Mod: PBBFAC | Performed by: INTERNAL MEDICINE

## 2021-02-02 PROCEDURE — 91300 COVID-19, MRNA, LNP-S, PF, 30 MCG/0.3 ML DOSE VACCINE: CPT | Mod: PBBFAC | Performed by: INTERNAL MEDICINE

## 2021-03-17 ENCOUNTER — HOSPITAL ENCOUNTER (EMERGENCY)
Facility: HOSPITAL | Age: 81
Discharge: HOME | End: 2021-03-17
Attending: EMERGENCY MEDICINE
Payer: MEDICARE

## 2021-03-17 VITALS
SYSTOLIC BLOOD PRESSURE: 171 MMHG | RESPIRATION RATE: 20 BRPM | TEMPERATURE: 99 F | BODY MASS INDEX: 27.32 KG/M2 | OXYGEN SATURATION: 97 % | DIASTOLIC BLOOD PRESSURE: 81 MMHG | HEIGHT: 66 IN | HEART RATE: 66 BPM | WEIGHT: 170 LBS

## 2021-03-17 DIAGNOSIS — S00.83XA FACIAL CONTUSION, INITIAL ENCOUNTER: Primary | ICD-10-CM

## 2021-03-17 DIAGNOSIS — M25.562 KNEE PAIN, LEFT: ICD-10-CM

## 2021-03-17 DIAGNOSIS — S09.90XA INJURY OF HEAD, INITIAL ENCOUNTER: ICD-10-CM

## 2021-03-17 PROCEDURE — 99285 PR EMERGENCY DEPT VISIT,LEVEL V: ICD-10-PCS | Mod: ,,, | Performed by: EMERGENCY MEDICINE

## 2021-03-17 PROCEDURE — 99285 EMERGENCY DEPT VISIT HI MDM: CPT | Mod: ,,, | Performed by: EMERGENCY MEDICINE

## 2021-03-17 PROCEDURE — 99284 EMERGENCY DEPT VISIT MOD MDM: CPT | Mod: 25

## 2021-03-17 PROCEDURE — 25000003 PHARM REV CODE 250: Performed by: EMERGENCY MEDICINE

## 2021-03-17 RX ORDER — IBUPROFEN 400 MG/1
400 TABLET ORAL
Status: COMPLETED | OUTPATIENT
Start: 2021-03-17 | End: 2021-03-17

## 2021-03-17 RX ORDER — ACETAMINOPHEN 500 MG
1000 TABLET ORAL
Status: COMPLETED | OUTPATIENT
Start: 2021-03-17 | End: 2021-03-17

## 2021-03-17 RX ADMIN — IBUPROFEN 400 MG: 400 TABLET, FILM COATED ORAL at 10:03

## 2021-03-17 RX ADMIN — ACETAMINOPHEN 1000 MG: 500 TABLET ORAL at 09:03

## 2021-03-18 ENCOUNTER — TELEPHONE (OUTPATIENT)
Dept: EMERGENCY MEDICINE | Facility: HOSPITAL | Age: 81
End: 2021-03-18

## 2021-05-26 ENCOUNTER — OFFICE VISIT (OUTPATIENT)
Dept: INTERNAL MEDICINE | Facility: CLINIC | Age: 81
End: 2021-05-26
Payer: MEDICARE

## 2021-05-26 VITALS
WEIGHT: 182.56 LBS | HEART RATE: 75 BPM | BODY MASS INDEX: 29.34 KG/M2 | HEIGHT: 66 IN | DIASTOLIC BLOOD PRESSURE: 70 MMHG | OXYGEN SATURATION: 96 % | SYSTOLIC BLOOD PRESSURE: 136 MMHG

## 2021-05-26 DIAGNOSIS — L98.9 SKIN LESION: ICD-10-CM

## 2021-05-26 DIAGNOSIS — E66.3 OVERWEIGHT (BMI 25.0-29.9): ICD-10-CM

## 2021-05-26 DIAGNOSIS — M62.838 TRAPEZIUS MUSCLE SPASM: ICD-10-CM

## 2021-05-26 DIAGNOSIS — E78.2 MIXED HYPERLIPIDEMIA: ICD-10-CM

## 2021-05-26 DIAGNOSIS — I10 ESSENTIAL HYPERTENSION: ICD-10-CM
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
## 2021-05-26 PROCEDURE — 3288F FALL RISK ASSESSMENT DOCD: CPT | Mod: CPTII,S$GLB,, | Performed by: INTERNAL MEDICINE

## 2021-05-26 PROCEDURE — 1100F PR PT FALLS ASSESS DOC 2+ FALLS/FALL W/INJURY/YR: ICD-10-PCS | Mod: CPTII,S$GLB,, | Performed by: INTERNAL MEDICINE

## 2021-05-26 PROCEDURE — 99999 PR PBB SHADOW E&M-EST. PATIENT-LVL V: CPT | Mod: PBBFAC,,, | Performed by: INTERNAL MEDICINE

## 2021-05-26 PROCEDURE — 1159F PR MEDICATION LIST DOCUMENTED IN MEDICAL RECORD: ICD-10-PCS | Mod: S$GLB,,, | Performed by: INTERNAL MEDICINE

## 2021-05-26 PROCEDURE — 3288F PR FALLS RISK ASSESSMENT DOCUMENTED: ICD-10-PCS | Mod: CPTII,S$GLB,, | Performed by: INTERNAL MEDICINE

## 2021-05-26 PROCEDURE — 1126F PR PAIN SEVERITY QUANTIFIED, NO PAIN PRESENT: ICD-10-PCS | Mod: S$GLB,,, | Performed by: INTERNAL MEDICINE

## 2021-05-26 PROCEDURE — 1126F AMNT PAIN NOTED NONE PRSNT: CPT | Mod: S$GLB,,, | Performed by: INTERNAL MEDICINE

## 2021-05-26 PROCEDURE — 1159F MED LIST DOCD IN RCRD: CPT | Mod: S$GLB,,, | Performed by: INTERNAL MEDICINE

## 2021-05-26 PROCEDURE — 99215 OFFICE O/P EST HI 40 MIN: CPT | Mod: S$GLB,,, | Performed by: INTERNAL MEDICINE

## 2021-05-26 PROCEDURE — 99999 PR PBB SHADOW E&M-EST. PATIENT-LVL V: ICD-10-PCS | Mod: PBBFAC,,, | Performed by: INTERNAL MEDICINE

## 2021-05-26 PROCEDURE — 1100F PTFALLS ASSESS-DOCD GE2>/YR: CPT | Mod: CPTII,S$GLB,, | Performed by: INTERNAL MEDICINE

## 2021-05-26 PROCEDURE — 99215 PR OFFICE/OUTPT VISIT, EST, LEVL V, 40-54 MIN: ICD-10-PCS | Mod: S$GLB,,, | Performed by: INTERNAL MEDICINE

## 2021-05-26 RX ORDER — VALSARTAN 160 MG/1
160 TABLET ORAL DAILY
Qty: 90 TABLET | Refills: 3 | Status: SHIPPED | OUTPATIENT
Start: 2021-05-26 | End: 2021-09-30 | Stop reason: SDUPTHER

## 2021-05-26 RX ORDER — HYDROCHLOROTHIAZIDE 25 MG/1
25 TABLET ORAL DAILY
Qty: 90 TABLET | Refills: 3 | Status: SHIPPED | OUTPATIENT
Start: 2021-05-26 | End: 2021-09-30 | Stop reason: SDUPTHER

## 2021-05-26 RX ORDER — ATORVASTATIN CALCIUM 40 MG/1
40 TABLET, FILM COATED ORAL DAILY
Qty: 90 TABLET | Refills: 3 | Status: SHIPPED | OUTPATIENT
Start: 2021-05-26 | End: 2021-09-30 | Stop reason: SDUPTHER

## 2021-05-26 RX ORDER — AMLODIPINE BESYLATE 10 MG/1
10 TABLET ORAL DAILY
Qty: 90 TABLET | Refills: 3 | Status: SHIPPED | OUTPATIENT
Start: 2021-05-26 | End: 2021-09-30 | Stop reason: SDUPTHER

## 2021-06-01 ENCOUNTER — CLINICAL SUPPORT (OUTPATIENT)
Dept: REHABILITATION | Facility: HOSPITAL | Age: 81
End: 2021-06-01
Payer: MEDICARE

## 2021-06-01 DIAGNOSIS — M53.82 IMPAIRED RANGE OF MOTION OF CERVICAL SPINE: ICD-10-CM

## 2021-06-01 DIAGNOSIS — R26.89 DECREASED FUNCTIONAL MOBILITY: ICD-10-CM

## 2021-06-01 DIAGNOSIS — M62.838 TRAPEZIUS MUSCLE SPASM: ICD-10-CM

## 2021-06-01 DIAGNOSIS — R29.898 DECREASED STRENGTH OF UPPER EXTREMITY: ICD-10-CM

## 2021-06-01 PROCEDURE — 97110 THERAPEUTIC EXERCISES: CPT

## 2021-06-01 PROCEDURE — 97161 PT EVAL LOW COMPLEX 20 MIN: CPT

## 2021-06-01 PROCEDURE — 97140 MANUAL THERAPY 1/> REGIONS: CPT

## 2021-06-04 ENCOUNTER — CLINICAL SUPPORT (OUTPATIENT)
Dept: REHABILITATION | Facility: HOSPITAL | Age: 81
End: 2021-06-04
Payer: MEDICARE

## 2021-06-04 DIAGNOSIS — M53.82 IMPAIRED RANGE OF MOTION OF CERVICAL SPINE: ICD-10-CM

## 2021-06-04 DIAGNOSIS — R26.89 DECREASED FUNCTIONAL MOBILITY: ICD-10-CM

## 2021-06-04 DIAGNOSIS — R29.898 DECREASED STRENGTH OF UPPER EXTREMITY: ICD-10-CM

## 2021-06-04 PROCEDURE — 97140 MANUAL THERAPY 1/> REGIONS: CPT

## 2021-06-04 PROCEDURE — 97110 THERAPEUTIC EXERCISES: CPT

## 2021-06-10 ENCOUNTER — CLINICAL SUPPORT (OUTPATIENT)
Dept: REHABILITATION | Facility: HOSPITAL | Age: 81
End: 2021-06-10
Payer: MEDICARE

## 2021-06-10 DIAGNOSIS — R29.898 DECREASED STRENGTH OF UPPER EXTREMITY: ICD-10-CM

## 2021-06-10 DIAGNOSIS — R26.89 DECREASED FUNCTIONAL MOBILITY: ICD-10-CM

## 2021-06-10 DIAGNOSIS — M53.82 IMPAIRED RANGE OF MOTION OF CERVICAL SPINE: ICD-10-CM

## 2021-06-10 PROCEDURE — 97140 MANUAL THERAPY 1/> REGIONS: CPT | Mod: CQ

## 2021-06-10 PROCEDURE — 97110 THERAPEUTIC EXERCISES: CPT | Mod: CQ

## 2021-06-16 ENCOUNTER — TELEPHONE (OUTPATIENT)
Dept: OPTOMETRY | Facility: CLINIC | Age: 81
End: 2021-06-16

## 2021-06-16 ENCOUNTER — CLINICAL SUPPORT (OUTPATIENT)
Dept: REHABILITATION | Facility: HOSPITAL | Age: 81
End: 2021-06-16
Payer: MEDICARE

## 2021-06-16 DIAGNOSIS — M53.82 IMPAIRED RANGE OF MOTION OF CERVICAL SPINE: ICD-10-CM

## 2021-06-16 DIAGNOSIS — R26.89 DECREASED FUNCTIONAL MOBILITY: ICD-10-CM

## 2021-06-16 DIAGNOSIS — R29.898 DECREASED STRENGTH OF UPPER EXTREMITY: ICD-10-CM

## 2021-06-16 PROCEDURE — 97110 THERAPEUTIC EXERCISES: CPT

## 2021-06-18 DIAGNOSIS — M62.838 TRAPEZIUS MUSCLE SPASM: Primary | ICD-10-CM

## 2021-06-21 RX ORDER — LIDOCAINE 50 MG/G
1 PATCH TOPICAL DAILY
Qty: 15 PATCH | Refills: 1 | OUTPATIENT
Start: 2021-06-21 | End: 2023-02-19

## 2021-06-23 ENCOUNTER — TELEPHONE (OUTPATIENT)
Dept: OPTOMETRY | Facility: CLINIC | Age: 81
End: 2021-06-23

## 2021-06-23 ENCOUNTER — TELEPHONE (OUTPATIENT)
Dept: PHARMACY | Facility: CLINIC | Age: 81
End: 2021-06-23

## 2021-06-23 ENCOUNTER — TELEPHONE (OUTPATIENT)
Dept: OPHTHALMOLOGY | Facility: CLINIC | Age: 81
End: 2021-06-23

## 2021-06-23 ENCOUNTER — OFFICE VISIT (OUTPATIENT)
Dept: OPTOMETRY | Facility: CLINIC | Age: 81
End: 2021-06-23
Payer: MEDICARE

## 2021-06-23 DIAGNOSIS — H25.13 SENILE NUCLEAR SCLEROSIS, BILATERAL: ICD-10-CM

## 2021-06-23 DIAGNOSIS — H52.203 MYOPIA OF BOTH EYES WITH ASTIGMATISM AND PRESBYOPIA: ICD-10-CM

## 2021-06-23 DIAGNOSIS — H52.4 MYOPIA OF BOTH EYES WITH ASTIGMATISM AND PRESBYOPIA: ICD-10-CM

## 2021-06-23 DIAGNOSIS — H43.393 VISUAL FLOATERS, BILATERAL: Primary | ICD-10-CM

## 2021-06-23 DIAGNOSIS — H52.13 MYOPIA OF BOTH EYES WITH ASTIGMATISM AND PRESBYOPIA: ICD-10-CM

## 2021-06-23 PROCEDURE — 92004 COMPRE OPH EXAM NEW PT 1/>: CPT | Mod: S$GLB,,, | Performed by: OPTOMETRIST

## 2021-06-23 PROCEDURE — 92004 PR EYE EXAM, NEW PATIENT,COMPREHESV: ICD-10-PCS | Mod: S$GLB,,, | Performed by: OPTOMETRIST

## 2021-06-23 PROCEDURE — 1126F AMNT PAIN NOTED NONE PRSNT: CPT | Mod: S$GLB,,, | Performed by: OPTOMETRIST

## 2021-06-23 PROCEDURE — 1126F PR PAIN SEVERITY QUANTIFIED, NO PAIN PRESENT: ICD-10-PCS | Mod: S$GLB,,, | Performed by: OPTOMETRIST

## 2021-06-23 PROCEDURE — 92015 DETERMINE REFRACTIVE STATE: CPT | Mod: S$GLB,,, | Performed by: OPTOMETRIST

## 2021-06-23 PROCEDURE — 99999 PR PBB SHADOW E&M-EST. PATIENT-LVL III: CPT | Mod: PBBFAC,,, | Performed by: OPTOMETRIST

## 2021-06-23 PROCEDURE — 99999 PR PBB SHADOW E&M-EST. PATIENT-LVL III: ICD-10-PCS | Mod: PBBFAC,,, | Performed by: OPTOMETRIST

## 2021-06-23 PROCEDURE — 1100F PR PT FALLS ASSESS DOC 2+ FALLS/FALL W/INJURY/YR: ICD-10-PCS | Mod: CPTII,S$GLB,, | Performed by: OPTOMETRIST

## 2021-06-23 PROCEDURE — 3288F PR FALLS RISK ASSESSMENT DOCUMENTED: ICD-10-PCS | Mod: CPTII,S$GLB,, | Performed by: OPTOMETRIST

## 2021-06-23 PROCEDURE — 3288F FALL RISK ASSESSMENT DOCD: CPT | Mod: CPTII,S$GLB,, | Performed by: OPTOMETRIST

## 2021-06-23 PROCEDURE — 92015 PR REFRACTION: ICD-10-PCS | Mod: S$GLB,,, | Performed by: OPTOMETRIST

## 2021-06-23 PROCEDURE — 1100F PTFALLS ASSESS-DOCD GE2>/YR: CPT | Mod: CPTII,S$GLB,, | Performed by: OPTOMETRIST

## 2021-06-28 ENCOUNTER — CLINICAL SUPPORT (OUTPATIENT)
Dept: REHABILITATION | Facility: HOSPITAL | Age: 81
End: 2021-06-28
Payer: MEDICARE

## 2021-06-28 DIAGNOSIS — M53.82 IMPAIRED RANGE OF MOTION OF CERVICAL SPINE: ICD-10-CM

## 2021-06-28 DIAGNOSIS — R29.898 DECREASED STRENGTH OF UPPER EXTREMITY: ICD-10-CM

## 2021-06-28 DIAGNOSIS — R26.89 DECREASED FUNCTIONAL MOBILITY: ICD-10-CM

## 2021-06-28 PROCEDURE — 97140 MANUAL THERAPY 1/> REGIONS: CPT

## 2021-07-02 ENCOUNTER — CLINICAL SUPPORT (OUTPATIENT)
Dept: REHABILITATION | Facility: HOSPITAL | Age: 81
End: 2021-07-02
Payer: MEDICARE

## 2021-07-02 DIAGNOSIS — R29.898 DECREASED STRENGTH OF UPPER EXTREMITY: ICD-10-CM

## 2021-07-02 DIAGNOSIS — M53.82 IMPAIRED RANGE OF MOTION OF CERVICAL SPINE: ICD-10-CM

## 2021-07-02 DIAGNOSIS — R26.89 DECREASED FUNCTIONAL MOBILITY: ICD-10-CM

## 2021-07-02 PROCEDURE — 97140 MANUAL THERAPY 1/> REGIONS: CPT

## 2021-07-02 PROCEDURE — 97110 THERAPEUTIC EXERCISES: CPT

## 2021-07-18 ENCOUNTER — PATIENT OUTREACH (OUTPATIENT)
Dept: ADMINISTRATIVE | Facility: OTHER | Age: 81
End: 2021-07-18

## 2021-07-19 ENCOUNTER — OFFICE VISIT (OUTPATIENT)
Dept: OPHTHALMOLOGY | Facility: CLINIC | Age: 81
End: 2021-07-19
Payer: MEDICARE

## 2021-07-19 DIAGNOSIS — I10 ESSENTIAL HYPERTENSION: ICD-10-CM

## 2021-07-19 DIAGNOSIS — H52.7 REFRACTIVE ERROR: ICD-10-CM

## 2021-07-19 DIAGNOSIS — H25.13 NUCLEAR SCLEROSIS OF BOTH EYES: Primary | ICD-10-CM

## 2021-07-19 DIAGNOSIS — H26.9 CORTICAL CATARACT OF BOTH EYES: ICD-10-CM

## 2021-07-19 PROCEDURE — 1101F PR PT FALLS ASSESS DOC 0-1 FALLS W/OUT INJ PAST YR: ICD-10-PCS | Mod: CPTII,S$GLB,, | Performed by: OPHTHALMOLOGY

## 2021-07-19 PROCEDURE — 3288F PR FALLS RISK ASSESSMENT DOCUMENTED: ICD-10-PCS | Mod: CPTII,S$GLB,, | Performed by: OPHTHALMOLOGY

## 2021-07-19 PROCEDURE — 92004 COMPRE OPH EXAM NEW PT 1/>: CPT | Mod: S$GLB,,, | Performed by: OPHTHALMOLOGY

## 2021-07-19 PROCEDURE — 3288F FALL RISK ASSESSMENT DOCD: CPT | Mod: CPTII,S$GLB,, | Performed by: OPHTHALMOLOGY

## 2021-07-19 PROCEDURE — 1126F AMNT PAIN NOTED NONE PRSNT: CPT | Mod: CPTII,S$GLB,, | Performed by: OPHTHALMOLOGY

## 2021-07-19 PROCEDURE — 92004 PR EYE EXAM, NEW PATIENT,COMPREHESV: ICD-10-PCS | Mod: S$GLB,,, | Performed by: OPHTHALMOLOGY

## 2021-07-19 PROCEDURE — 1101F PT FALLS ASSESS-DOCD LE1/YR: CPT | Mod: CPTII,S$GLB,, | Performed by: OPHTHALMOLOGY

## 2021-07-19 PROCEDURE — 99999 PR PBB SHADOW E&M-EST. PATIENT-LVL III: CPT | Mod: PBBFAC,,, | Performed by: OPHTHALMOLOGY

## 2021-07-19 PROCEDURE — 99999 PR PBB SHADOW E&M-EST. PATIENT-LVL III: ICD-10-PCS | Mod: PBBFAC,,, | Performed by: OPHTHALMOLOGY

## 2021-07-19 PROCEDURE — 1126F PR PAIN SEVERITY QUANTIFIED, NO PAIN PRESENT: ICD-10-PCS | Mod: CPTII,S$GLB,, | Performed by: OPHTHALMOLOGY

## 2021-09-28 DIAGNOSIS — I10 ESSENTIAL HYPERTENSION: ICD-10-CM

## 2021-09-28 DIAGNOSIS — E78.2 MIXED HYPERLIPIDEMIA: ICD-10-CM

## 2021-09-28 RX ORDER — HYDROCHLOROTHIAZIDE 25 MG/1
25 TABLET ORAL DAILY
Qty: 90 TABLET | Refills: 3 | Status: CANCELLED | OUTPATIENT
Start: 2021-09-28

## 2021-09-28 RX ORDER — VALSARTAN 160 MG/1
160 TABLET ORAL DAILY
Qty: 90 TABLET | Refills: 3 | Status: CANCELLED | OUTPATIENT
Start: 2021-09-28 | End: 2022-09-28

## 2021-09-28 RX ORDER — ATORVASTATIN CALCIUM 40 MG/1
40 TABLET, FILM COATED ORAL DAILY
Qty: 90 TABLET | Refills: 3 | Status: CANCELLED | OUTPATIENT
Start: 2021-09-28 | End: 2022-09-28

## 2021-09-28 RX ORDER — AMLODIPINE BESYLATE 10 MG/1
10 TABLET ORAL DAILY
Qty: 90 TABLET | Refills: 3 | Status: CANCELLED | OUTPATIENT
Start: 2021-09-28

## 2021-09-30 DIAGNOSIS — I10 ESSENTIAL HYPERTENSION: ICD-10-CM

## 2021-09-30 DIAGNOSIS — E78.2 MIXED HYPERLIPIDEMIA: ICD-10-CM

## 2021-09-30 RX ORDER — VALSARTAN 160 MG/1
160 TABLET ORAL DAILY
Qty: 90 TABLET | Refills: 3 | Status: SHIPPED | OUTPATIENT
Start: 2021-09-30 | End: 2021-10-11

## 2021-09-30 RX ORDER — ATORVASTATIN CALCIUM 40 MG/1
40 TABLET, FILM COATED ORAL DAILY
Qty: 90 TABLET | Refills: 3 | Status: SHIPPED | OUTPATIENT
Start: 2021-09-30 | End: 2022-03-31 | Stop reason: SDUPTHER

## 2021-09-30 RX ORDER — AMLODIPINE BESYLATE 10 MG/1
10 TABLET ORAL DAILY
Qty: 90 TABLET | Refills: 3 | Status: SHIPPED | OUTPATIENT
Start: 2021-09-30 | End: 2022-03-31 | Stop reason: SDUPTHER

## 2021-09-30 RX ORDER — HYDROCHLOROTHIAZIDE 25 MG/1
25 TABLET ORAL DAILY
Qty: 90 TABLET | Refills: 3 | Status: SHIPPED | OUTPATIENT
Start: 2021-09-30 | End: 2022-03-31 | Stop reason: SDUPTHER

## 2021-10-11 ENCOUNTER — IMMUNIZATION (OUTPATIENT)
Dept: INTERNAL MEDICINE | Facility: CLINIC | Age: 81
End: 2021-10-11
Payer: MEDICARE

## 2021-10-11 ENCOUNTER — OFFICE VISIT (OUTPATIENT)
Dept: INTERNAL MEDICINE | Facility: CLINIC | Age: 81
End: 2021-10-11
Attending: INTERNAL MEDICINE
Payer: MEDICARE

## 2021-10-11 ENCOUNTER — LAB VISIT (OUTPATIENT)
Dept: LAB | Facility: OTHER | Age: 81
End: 2021-10-11
Attending: INTERNAL MEDICINE
Payer: MEDICARE

## 2021-10-11 VITALS
HEIGHT: 66 IN | HEART RATE: 80 BPM | DIASTOLIC BLOOD PRESSURE: 66 MMHG | BODY MASS INDEX: 29.34 KG/M2 | OXYGEN SATURATION: 97 % | WEIGHT: 182.56 LBS | SYSTOLIC BLOOD PRESSURE: 140 MMHG

## 2021-10-11 DIAGNOSIS — N63.0 BREAST LUMP IN FEMALE: Primary | ICD-10-CM

## 2021-10-11 DIAGNOSIS — Z23 NEED FOR VACCINATION: Primary | ICD-10-CM

## 2021-10-11 DIAGNOSIS — I10 ESSENTIAL HYPERTENSION: ICD-10-CM

## 2021-10-11 DIAGNOSIS — N64.9 DISORDER OF BREAST, UNSPECIFIED: ICD-10-CM

## 2021-10-11 DIAGNOSIS — E66.3 OVERWEIGHT (BMI 25.0-29.9): ICD-10-CM

## 2021-10-11 DIAGNOSIS — R73.03 PRE-DIABETES: ICD-10-CM

## 2021-10-11 LAB
ALBUMIN SERPL BCP-MCNC: 4 G/DL (ref 3.5–5.2)
ALP SERPL-CCNC: 58 U/L (ref 55–135)
ALT SERPL W/O P-5'-P-CCNC: 24 U/L (ref 10–44)
ANION GAP SERPL CALC-SCNC: 10 MMOL/L (ref 8–16)
AST SERPL-CCNC: 28 U/L (ref 10–40)
BILIRUB SERPL-MCNC: 1 MG/DL (ref 0.1–1)
BUN SERPL-MCNC: 23 MG/DL (ref 8–23)
CALCIUM SERPL-MCNC: 9.8 MG/DL (ref 8.7–10.5)
CHLORIDE SERPL-SCNC: 104 MMOL/L (ref 95–110)
CO2 SERPL-SCNC: 28 MMOL/L (ref 23–29)
CREAT SERPL-MCNC: 0.8 MG/DL (ref 0.5–1.4)
EST. GFR  (AFRICAN AMERICAN): >60 ML/MIN/1.73 M^2
EST. GFR  (NON AFRICAN AMERICAN): >60 ML/MIN/1.73 M^2
ESTIMATED AVG GLUCOSE: 131 MG/DL (ref 68–131)
GLUCOSE SERPL-MCNC: 124 MG/DL (ref 70–110)
HBA1C MFR BLD: 6.2 % (ref 4–5.6)
POTASSIUM SERPL-SCNC: 3.8 MMOL/L (ref 3.5–5.1)
PROT SERPL-MCNC: 7.6 G/DL (ref 6–8.4)
SODIUM SERPL-SCNC: 142 MMOL/L (ref 136–145)

## 2021-10-11 PROCEDURE — 1101F PT FALLS ASSESS-DOCD LE1/YR: CPT | Mod: CPTII,S$GLB,, | Performed by: INTERNAL MEDICINE

## 2021-10-11 PROCEDURE — 83036 HEMOGLOBIN GLYCOSYLATED A1C: CPT | Performed by: INTERNAL MEDICINE

## 2021-10-11 PROCEDURE — 99214 OFFICE O/P EST MOD 30 MIN: CPT | Mod: S$GLB,,, | Performed by: INTERNAL MEDICINE

## 2021-10-11 PROCEDURE — 0003A COVID-19, MRNA, LNP-S, PF, 30 MCG/0.3 ML DOSE VACCINE: CPT | Mod: PBBFAC | Performed by: INTERNAL MEDICINE

## 2021-10-11 PROCEDURE — 1160F PR REVIEW ALL MEDS BY PRESCRIBER/CLIN PHARMACIST DOCUMENTED: ICD-10-PCS | Mod: CPTII,S$GLB,, | Performed by: INTERNAL MEDICINE

## 2021-10-11 PROCEDURE — 3078F PR MOST RECENT DIASTOLIC BLOOD PRESSURE < 80 MM HG: ICD-10-PCS | Mod: CPTII,S$GLB,, | Performed by: INTERNAL MEDICINE

## 2021-10-11 PROCEDURE — 3077F PR MOST RECENT SYSTOLIC BLOOD PRESSURE >= 140 MM HG: ICD-10-PCS | Mod: CPTII,S$GLB,, | Performed by: INTERNAL MEDICINE

## 2021-10-11 PROCEDURE — 1101F PR PT FALLS ASSESS DOC 0-1 FALLS W/OUT INJ PAST YR: ICD-10-PCS | Mod: CPTII,S$GLB,, | Performed by: INTERNAL MEDICINE

## 2021-10-11 PROCEDURE — 99999 PR PBB SHADOW E&M-EST. PATIENT-LVL III: ICD-10-PCS | Mod: PBBFAC,,, | Performed by: INTERNAL MEDICINE

## 2021-10-11 PROCEDURE — 3077F SYST BP >= 140 MM HG: CPT | Mod: CPTII,S$GLB,, | Performed by: INTERNAL MEDICINE

## 2021-10-11 PROCEDURE — 1159F MED LIST DOCD IN RCRD: CPT | Mod: CPTII,S$GLB,, | Performed by: INTERNAL MEDICINE

## 2021-10-11 PROCEDURE — 3288F PR FALLS RISK ASSESSMENT DOCUMENTED: ICD-10-PCS | Mod: CPTII,S$GLB,, | Performed by: INTERNAL MEDICINE

## 2021-10-11 PROCEDURE — 36415 COLL VENOUS BLD VENIPUNCTURE: CPT | Performed by: INTERNAL MEDICINE

## 2021-10-11 PROCEDURE — 99214 PR OFFICE/OUTPT VISIT, EST, LEVL IV, 30-39 MIN: ICD-10-PCS | Mod: S$GLB,,, | Performed by: INTERNAL MEDICINE

## 2021-10-11 PROCEDURE — 3078F DIAST BP <80 MM HG: CPT | Mod: CPTII,S$GLB,, | Performed by: INTERNAL MEDICINE

## 2021-10-11 PROCEDURE — 1159F PR MEDICATION LIST DOCUMENTED IN MEDICAL RECORD: ICD-10-PCS | Mod: CPTII,S$GLB,, | Performed by: INTERNAL MEDICINE

## 2021-10-11 PROCEDURE — 99999 PR PBB SHADOW E&M-EST. PATIENT-LVL III: CPT | Mod: PBBFAC,,, | Performed by: INTERNAL MEDICINE

## 2021-10-11 PROCEDURE — 91300 COVID-19, MRNA, LNP-S, PF, 30 MCG/0.3 ML DOSE VACCINE: CPT | Mod: PBBFAC | Performed by: INTERNAL MEDICINE

## 2021-10-11 PROCEDURE — 3288F FALL RISK ASSESSMENT DOCD: CPT | Mod: CPTII,S$GLB,, | Performed by: INTERNAL MEDICINE

## 2021-10-11 PROCEDURE — 80053 COMPREHEN METABOLIC PANEL: CPT | Performed by: INTERNAL MEDICINE

## 2021-10-11 PROCEDURE — 1160F RVW MEDS BY RX/DR IN RCRD: CPT | Mod: CPTII,S$GLB,, | Performed by: INTERNAL MEDICINE

## 2021-10-11 RX ORDER — VALSARTAN 320 MG/1
320 TABLET ORAL DAILY
Qty: 90 TABLET | Refills: 0 | Status: SHIPPED | OUTPATIENT
Start: 2021-10-11 | End: 2021-11-24 | Stop reason: SDUPTHER

## 2021-10-20 ENCOUNTER — TELEPHONE (OUTPATIENT)
Dept: INTERNAL MEDICINE | Facility: CLINIC | Age: 81
End: 2021-10-20

## 2021-10-25 ENCOUNTER — HOSPITAL ENCOUNTER (OUTPATIENT)
Dept: RADIOLOGY | Facility: OTHER | Age: 81
Discharge: HOME | End: 2021-10-25
Attending: INTERNAL MEDICINE
Payer: MEDICARE

## 2021-10-25 DIAGNOSIS — N64.9 DISORDER OF BREAST, UNSPECIFIED: ICD-10-CM

## 2021-10-25 DIAGNOSIS — N63.0 BREAST LUMP IN FEMALE: ICD-10-CM

## 2021-10-25 PROCEDURE — 77066 DX MAMMO INCL CAD BI: CPT | Mod: 26,,, | Performed by: RADIOLOGY

## 2021-10-25 PROCEDURE — 77062 BREAST TOMOSYNTHESIS BI: CPT | Mod: 26,,, | Performed by: RADIOLOGY

## 2021-10-25 PROCEDURE — 76642 US BREAST BILATERAL LIMITED: ICD-10-PCS | Mod: 26,50,, | Performed by: RADIOLOGY

## 2021-10-25 PROCEDURE — 77062 BREAST TOMOSYNTHESIS BI: CPT | Mod: TC

## 2021-10-25 PROCEDURE — 76642 ULTRASOUND BREAST LIMITED: CPT | Mod: TC,50

## 2021-10-25 PROCEDURE — 77062 MAMMO DIGITAL DIAGNOSTIC BILAT WITH TOMO: ICD-10-PCS | Mod: 26,,, | Performed by: RADIOLOGY

## 2021-10-25 PROCEDURE — 76642 ULTRASOUND BREAST LIMITED: CPT | Mod: 26,50,, | Performed by: RADIOLOGY

## 2021-10-25 PROCEDURE — 77066 MAMMO DIGITAL DIAGNOSTIC BILAT WITH TOMO: ICD-10-PCS | Mod: 26,,, | Performed by: RADIOLOGY

## 2021-10-26 ENCOUNTER — TELEPHONE (OUTPATIENT)
Dept: RADIOLOGY | Facility: OTHER | Age: 81
End: 2021-10-26
Payer: MEDICARE

## 2021-11-16 ENCOUNTER — TELEPHONE (OUTPATIENT)
Dept: INTERNAL MEDICINE | Facility: CLINIC | Age: 81
End: 2021-11-16
Payer: MEDICARE

## 2021-11-24 ENCOUNTER — OFFICE VISIT (OUTPATIENT)
Dept: INTERNAL MEDICINE | Facility: CLINIC | Age: 81
End: 2021-11-24
Payer: MEDICARE

## 2021-11-24 VITALS
OXYGEN SATURATION: 97 % | HEIGHT: 66 IN | HEART RATE: 88 BPM | BODY MASS INDEX: 29.27 KG/M2 | SYSTOLIC BLOOD PRESSURE: 130 MMHG | WEIGHT: 182.13 LBS | DIASTOLIC BLOOD PRESSURE: 70 MMHG

## 2021-11-24 DIAGNOSIS — E66.3 OVERWEIGHT (BMI 25.0-29.9): ICD-10-CM

## 2021-11-24 DIAGNOSIS — Z00.00 ANNUAL PHYSICAL EXAM: ICD-10-CM

## 2021-11-24 DIAGNOSIS — E78.2 MIXED HYPERLIPIDEMIA: ICD-10-CM

## 2021-11-24 DIAGNOSIS — R73.03 PRE-DIABETES: ICD-10-CM

## 2021-11-24 DIAGNOSIS — Z23 INFLUENZA VACCINE NEEDED: ICD-10-CM

## 2021-11-24 DIAGNOSIS — I10 ESSENTIAL HYPERTENSION: Primary | ICD-10-CM

## 2021-11-24 DIAGNOSIS — Z23 NEED FOR ZOSTER VACCINE: ICD-10-CM

## 2021-11-24 DIAGNOSIS — K02.9 DENTAL CARIES: ICD-10-CM

## 2021-11-24 DIAGNOSIS — N63.0 BREAST MASS: ICD-10-CM

## 2021-11-24 PROCEDURE — 99999 PR PBB SHADOW E&M-EST. PATIENT-LVL IV: CPT | Mod: PBBFAC,,, | Performed by: INTERNAL MEDICINE

## 2021-11-24 PROCEDURE — 99215 PR OFFICE/OUTPT VISIT, EST, LEVL V, 40-54 MIN: ICD-10-PCS | Mod: S$GLB,,, | Performed by: INTERNAL MEDICINE

## 2021-11-24 PROCEDURE — 99215 OFFICE O/P EST HI 40 MIN: CPT | Mod: S$GLB,,, | Performed by: INTERNAL MEDICINE

## 2021-11-24 PROCEDURE — 99999 PR PBB SHADOW E&M-EST. PATIENT-LVL IV: ICD-10-PCS | Mod: PBBFAC,,, | Performed by: INTERNAL MEDICINE

## 2021-11-24 RX ORDER — VALSARTAN 320 MG/1
320 TABLET ORAL DAILY
Qty: 90 TABLET | Refills: 3 | Status: SHIPPED | OUTPATIENT
Start: 2021-11-24 | End: 2022-03-31 | Stop reason: SDUPTHER

## 2021-12-16 ENCOUNTER — TELEPHONE (OUTPATIENT)
Dept: RADIOLOGY | Facility: OTHER | Age: 81
End: 2021-12-16
Payer: MEDICARE

## 2021-12-20 ENCOUNTER — HOSPITAL ENCOUNTER (OUTPATIENT)
Dept: RADIOLOGY | Facility: OTHER | Age: 81
Discharge: HOME | End: 2021-12-20
Attending: INTERNAL MEDICINE
Payer: MEDICARE

## 2021-12-20 DIAGNOSIS — R92.8 ABNORMAL MAMMOGRAM: ICD-10-CM

## 2021-12-20 DIAGNOSIS — R92.8 ABNORMAL MAMMOGRAM: Primary | ICD-10-CM

## 2021-12-20 PROCEDURE — 88360 PR  TUMOR IMMUNOHISTOCHEM/MANUAL: ICD-10-PCS | Mod: 26,,, | Performed by: PATHOLOGY

## 2021-12-20 PROCEDURE — 77066 DX MAMMO INCL CAD BI: CPT | Mod: 26,,, | Performed by: RADIOLOGY

## 2021-12-20 PROCEDURE — 88305 TISSUE EXAM BY PATHOLOGIST: CPT | Mod: 26,,, | Performed by: PATHOLOGY

## 2021-12-20 PROCEDURE — 88342 IMHCHEM/IMCYTCHM 1ST ANTB: CPT | Mod: 26,59,, | Performed by: PATHOLOGY

## 2021-12-20 PROCEDURE — 77066 DX MAMMO INCL CAD BI: CPT | Mod: TC

## 2021-12-20 PROCEDURE — 19083 BX BREAST 1ST LESION US IMAG: CPT | Mod: LT,,, | Performed by: RADIOLOGY

## 2021-12-20 PROCEDURE — 88341 PR IHC OR ICC EACH ADD'L SINGLE ANTIBODY  STAINPR: ICD-10-PCS | Mod: 26,59,, | Performed by: PATHOLOGY

## 2021-12-20 PROCEDURE — 77066 MAMMO DIGITAL DIAGNOSTIC BILAT: ICD-10-PCS | Mod: 26,,, | Performed by: RADIOLOGY

## 2021-12-20 PROCEDURE — 19083 US BREAST BIOPSY WITH IMAGING 1ST SITE LEFT: ICD-10-PCS | Mod: 51,RT,, | Performed by: RADIOLOGY

## 2021-12-20 PROCEDURE — 88342 IMHCHEM/IMCYTCHM 1ST ANTB: CPT | Mod: 59 | Performed by: PATHOLOGY

## 2021-12-20 PROCEDURE — 88305 TISSUE EXAM BY PATHOLOGIST: CPT | Performed by: PATHOLOGY

## 2021-12-20 PROCEDURE — 25000003 PHARM REV CODE 250: Performed by: INTERNAL MEDICINE

## 2021-12-20 PROCEDURE — 88305 TISSUE EXAM BY PATHOLOGIST: ICD-10-PCS | Mod: 26,,, | Performed by: PATHOLOGY

## 2021-12-20 PROCEDURE — 88341 IMHCHEM/IMCYTCHM EA ADD ANTB: CPT | Mod: 26,59,, | Performed by: PATHOLOGY

## 2021-12-20 PROCEDURE — 88341 IMHCHEM/IMCYTCHM EA ADD ANTB: CPT | Performed by: PATHOLOGY

## 2021-12-20 PROCEDURE — 19083 BX BREAST 1ST LESION US IMAG: CPT | Mod: 51,RT,, | Performed by: RADIOLOGY

## 2021-12-20 PROCEDURE — 88342 CHG IMMUNOCYTOCHEMISTRY: ICD-10-PCS | Mod: 26,59,, | Performed by: PATHOLOGY

## 2021-12-20 PROCEDURE — 27201068 US BREAST BIOPSY WITH IMAGING 1ST SITE RIGHT

## 2021-12-20 PROCEDURE — 27201068 US BREAST BIOPSY WITH IMAGING 1ST SITE LEFT

## 2021-12-20 PROCEDURE — 88360 TUMOR IMMUNOHISTOCHEM/MANUAL: CPT | Mod: 59 | Performed by: PATHOLOGY

## 2021-12-20 PROCEDURE — 88360 TUMOR IMMUNOHISTOCHEM/MANUAL: CPT | Mod: 26,,, | Performed by: PATHOLOGY

## 2021-12-20 RX ORDER — LIDOCAINE HYDROCHLORIDE 10 MG/ML
5 INJECTION INFILTRATION; PERINEURAL ONCE
Status: COMPLETED | OUTPATIENT
Start: 2021-12-20 | End: 2021-12-20

## 2021-12-20 RX ORDER — LIDOCAINE HYDROCHLORIDE AND EPINEPHRINE 20; 10 MG/ML; UG/ML
10 INJECTION, SOLUTION INFILTRATION; PERINEURAL ONCE
Status: COMPLETED | OUTPATIENT
Start: 2021-12-20 | End: 2021-12-20

## 2021-12-20 RX ADMIN — LIDOCAINE HYDROCHLORIDE 5 ML: 10 INJECTION, SOLUTION INFILTRATION; PERINEURAL at 09:12

## 2021-12-20 RX ADMIN — LIDOCAINE HYDROCHLORIDE,EPINEPHRINE BITARTRATE 10 ML: 20; .01 INJECTION, SOLUTION INFILTRATION; PERINEURAL at 09:12

## 2021-12-23 ENCOUNTER — TELEPHONE (OUTPATIENT)
Dept: RADIOLOGY | Facility: OTHER | Age: 81
End: 2021-12-23
Payer: MEDICARE

## 2021-12-29 ENCOUNTER — TELEPHONE (OUTPATIENT)
Dept: INTERNAL MEDICINE | Facility: CLINIC | Age: 81
End: 2021-12-29
Payer: MEDICARE

## 2021-12-29 ENCOUNTER — TELEPHONE (OUTPATIENT)
Dept: INTERVENTIONAL RADIOLOGY/VASCULAR | Facility: OTHER | Age: 81
End: 2021-12-29
Payer: MEDICARE

## 2021-12-29 DIAGNOSIS — N63.0 BREAST MASS: Primary | ICD-10-CM

## 2021-12-30 ENCOUNTER — DOCUMENTATION ONLY (OUTPATIENT)
Dept: SURGERY | Facility: CLINIC | Age: 81
End: 2021-12-30
Payer: MEDICARE

## 2022-01-06 ENCOUNTER — OFFICE VISIT (OUTPATIENT)
Dept: HEMATOLOGY/ONCOLOGY | Facility: CLINIC | Age: 82
End: 2022-01-06
Payer: MEDICARE

## 2022-01-06 VITALS
SYSTOLIC BLOOD PRESSURE: 171 MMHG | WEIGHT: 183.19 LBS | HEART RATE: 79 BPM | OXYGEN SATURATION: 96 % | HEIGHT: 66 IN | DIASTOLIC BLOOD PRESSURE: 77 MMHG | BODY MASS INDEX: 29.44 KG/M2 | TEMPERATURE: 96 F

## 2022-01-06 DIAGNOSIS — C50.512 MALIGNANT NEOPLASM OF LOWER-OUTER QUADRANT OF LEFT BREAST OF FEMALE, ESTROGEN RECEPTOR POSITIVE: Primary | ICD-10-CM

## 2022-01-06 DIAGNOSIS — Z17.0 MALIGNANT NEOPLASM OF LOWER-OUTER QUADRANT OF LEFT BREAST OF FEMALE, ESTROGEN RECEPTOR POSITIVE: Primary | ICD-10-CM

## 2022-01-06 PROCEDURE — 3078F DIAST BP <80 MM HG: CPT | Mod: CPTII,S$GLB,, | Performed by: INTERNAL MEDICINE

## 2022-01-06 PROCEDURE — 99417 PROLNG OP E/M EACH 15 MIN: CPT | Mod: S$GLB,,, | Performed by: INTERNAL MEDICINE

## 2022-01-06 PROCEDURE — 1101F PR PT FALLS ASSESS DOC 0-1 FALLS W/OUT INJ PAST YR: ICD-10-PCS | Mod: CPTII,S$GLB,, | Performed by: INTERNAL MEDICINE

## 2022-01-06 PROCEDURE — 99999 PR PBB SHADOW E&M-EST. PATIENT-LVL IV: CPT | Mod: PBBFAC,,, | Performed by: INTERNAL MEDICINE

## 2022-01-06 PROCEDURE — 3077F SYST BP >= 140 MM HG: CPT | Mod: CPTII,S$GLB,, | Performed by: INTERNAL MEDICINE

## 2022-01-06 PROCEDURE — 1126F PR PAIN SEVERITY QUANTIFIED, NO PAIN PRESENT: ICD-10-PCS | Mod: CPTII,S$GLB,, | Performed by: INTERNAL MEDICINE

## 2022-01-06 PROCEDURE — 99999 PR PBB SHADOW E&M-EST. PATIENT-LVL IV: ICD-10-PCS | Mod: PBBFAC,,, | Performed by: INTERNAL MEDICINE

## 2022-01-06 PROCEDURE — 99205 OFFICE O/P NEW HI 60 MIN: CPT | Mod: S$GLB,,, | Performed by: INTERNAL MEDICINE

## 2022-01-06 PROCEDURE — 3078F PR MOST RECENT DIASTOLIC BLOOD PRESSURE < 80 MM HG: ICD-10-PCS | Mod: CPTII,S$GLB,, | Performed by: INTERNAL MEDICINE

## 2022-01-06 PROCEDURE — 3077F PR MOST RECENT SYSTOLIC BLOOD PRESSURE >= 140 MM HG: ICD-10-PCS | Mod: CPTII,S$GLB,, | Performed by: INTERNAL MEDICINE

## 2022-01-06 PROCEDURE — 99205 PR OFFICE/OUTPT VISIT, NEW, LEVL V, 60-74 MIN: ICD-10-PCS | Mod: S$GLB,,, | Performed by: INTERNAL MEDICINE

## 2022-01-06 PROCEDURE — 1159F PR MEDICATION LIST DOCUMENTED IN MEDICAL RECORD: ICD-10-PCS | Mod: CPTII,S$GLB,, | Performed by: INTERNAL MEDICINE

## 2022-01-06 PROCEDURE — 1126F AMNT PAIN NOTED NONE PRSNT: CPT | Mod: CPTII,S$GLB,, | Performed by: INTERNAL MEDICINE

## 2022-01-06 PROCEDURE — 1159F MED LIST DOCD IN RCRD: CPT | Mod: CPTII,S$GLB,, | Performed by: INTERNAL MEDICINE

## 2022-01-06 PROCEDURE — 3288F FALL RISK ASSESSMENT DOCD: CPT | Mod: CPTII,S$GLB,, | Performed by: INTERNAL MEDICINE

## 2022-01-06 PROCEDURE — 3288F PR FALLS RISK ASSESSMENT DOCUMENTED: ICD-10-PCS | Mod: CPTII,S$GLB,, | Performed by: INTERNAL MEDICINE

## 2022-01-06 PROCEDURE — 99417 PR PROLONGED SVC, OUTPT, W/WO DIRECT PT CONTACT,  EA ADDTL 15 MIN: ICD-10-PCS | Mod: S$GLB,,, | Performed by: INTERNAL MEDICINE

## 2022-01-06 PROCEDURE — 1101F PT FALLS ASSESS-DOCD LE1/YR: CPT | Mod: CPTII,S$GLB,, | Performed by: INTERNAL MEDICINE

## 2022-01-06 RX ORDER — ANASTROZOLE 1 MG/1
1 TABLET ORAL DAILY
Qty: 30 TABLET | Refills: 11 | Status: SHIPPED | OUTPATIENT
Start: 2022-01-06 | End: 2023-01-12 | Stop reason: SDUPTHER

## 2022-01-06 NOTE — PROGRESS NOTES
[facility] Note:      Chief Complaint:   Encounter Diagnosis   Name Primary?    Malignant neoplasm of lower-outer quadrant of left breast of female, estrogen receptor positive Yes       Cancer Staging  Malignant neoplasm of lower-outer quadrant of left breast of female, estrogen receptor positive  Staging form: Breast, AJCC 8th Edition  - Clinical stage from 12/20/2021: cT2, cN0, cM0, G1, ER+, NH: Not Assessed, HER2: Not Assessed - Signed by Aliza Olvera MD on 1/6/2022      HPI:  Karis Briceño is a 81 y.o. female who presents today for evaluation of newly diagnosed breast cancer.     Oncology History  Patient felt a mass in the left breast in October 2021  Diagnostic mammogram 10/25/21 demonstrated bilateral breast masses with recommendation for biopsy  Bilateral breast biopsy 12/20/21 showed a R breast IDC, grade 1, diffuse staining for estrogen and a L breast fibroadenoma   Patient's pathology was initially sent to [place], therefore no receptors were initially run    Patient has a history of HRT which she took for 10 years. She stopped these >10 years ago.       Social History     Tobacco Use    Smoking status: Never Smoker    Smokeless tobacco: Never Used   Substance Use Topics    Alcohol use: Yes     Comment: socially    Drug use: No     Family History   Problem Relation Age of Onset    Heart attack Father     Heart disease Sister     No Known Problems Mother     Skin cancer Neg Hx      Past Medical History:   Diagnosis Date    Gallstone pancreatitis 09/2016    Gallstones     Hyperlipidemia     Hypertension     Malignant neoplasm of lower-outer quadrant of left breast of female, estrogen receptor positive 1/6/2022     Past Surgical History:   Procedure Laterality Date    HYSTERECTOMY         Patient Active Problem List   Diagnosis    Essential hypertension    Cholelithiasis    Refused influenza vaccine    Overweight (BMI 25.0-29.9)     "Pre-diabetes    Mixed hyperlipidemia    Poor dentition    Chronic left shoulder pain    Decreased strength of upper extremity    Decreased functional mobility    Impaired range of motion of cervical spine    Malignant neoplasm of lower-outer quadrant of left breast of female, estrogen receptor positive       Current Outpatient Medications   Medication Instructions    amLODIPine (NORVASC) 10 mg, Oral, Daily    anastrozole (ARIMIDEX) 1 mg, Oral, Daily    aspirin (ECOTRIN) 81 mg, Oral, Daily    atorvastatin (LIPITOR) 40 mg, Oral, Daily    cyclobenzaprine (FLEXERIL) 5 mg, Oral, 3 times daily PRN    diclofenac sodium (VOLTAREN) 2 g, Topical (Top), 4 times daily PRN    fish oil-omega-3 fatty acids 300-1,000 mg capsule 1 capsule, Oral, Daily    hydroCHLOROthiazide (HYDRODIURIL) 25 mg, Oral, Daily    LIDOcaine (LIDODERM) 5 % 1 patch, Transdermal, Daily, Remove & Discard patch within 12 hours or as directed by MD Eli freemanrtan (DIOVAN) 320 mg, Oral, Daily       Review of Systems:   Review of Systems   Constitutional: Negative.    HENT: Negative.    Respiratory: Negative.    Cardiovascular: Negative.    Gastrointestinal: Negative.    Musculoskeletal: Negative.    Neurological: Negative.    Endo/Heme/Allergies: Negative.    All other systems reviewed and are negative.      PHYSICAL EXAM:  BP (!) 171/77 (BP Location: Left arm, Patient Position: Sitting, BP Method: Large (Automatic))   Pulse 79   Temp 96 °F (35.6 °C) (Oral)   Ht 5' 6" (1.676 m)   Wt 83.1 kg (183 lb 3.2 oz)   SpO2 96%   BMI 29.57 kg/m²     General Appearance:    Alert, cooperative, no distress, appears stated age   Neck:   Supple, symmetrical, no adenopathy;     thyroid:  no enlargement/tenderness/nodules   Lungs:     Clear to auscultation bilaterally, respirations unlabored    Heart:    Regular rate and rhythm, S1 and S2 normal   Breast Exam:    Bilateral breast normal. No masses, no tenderness, no skin or nipple abnormalities.  No " lymphadenopathy.    Abdomen:     Soft, non-tender, bowel sounds active, no masses, no organomegaly   Extremities:   Extremities normal, atraumatic, no cyanosis or edema   Pulses:   2+ and symmetric all extremities   Skin:   Skin color, texture, turgor normal, no rashes or lesions   Lymph nodes:   Cervical, supraclavicular, and axillary nodes normal   Neurologic:   CNII-XII intact, normal strength, sensation and reflexes     throughout           Pertinent Labs & Imaging:  Diagnostic mammogram 10/25/21:   Impression:  Left  Mass: Left breast mass at the anterior 6 o'clock position. Assessment: 4 - Suspicious finding. Biopsy is recommended.   Right  Mass: Right breast 6 mm mass at the 10 o'clock position. Assessment: 4 - Suspicious finding. Biopsy is recommended.   BI-RADS Category: Overall: 4 - Suspicious    Bilateral breast ultrasound 12/20/21  1. BREAST, LEFT, 06:00, BIOPSY:   -. Invasive ductal carcinoma, see [place] Consultative Report below.   - Size of invasive carcinoma:  3 MM in greatest linear dimension within a single core biopsy fragment.   - Aminta Histologic Score: Grade 1 of 3.                     Tubule Formation:  2                     Nuclear Pleomorphism:  2                     Mitotic Activity:  1   - No definitive in-situ component identified.   - No lymphovascular invasion.   - Microcalcifications:  Not identified.   - Breast biomarkers will be performed upon return of materials from [facility].   2. BREAST, RIGHT, 10:00, BIOPSY:   - Benign breast tissue with stromal fibrosis and fibroadenomatoid nodules.   - Microcalcifications:  Not identified.   - Negative for atypia or malignancy.     [facility] FINAL DIAGNOSIS:   Interpretation   Breast, core biopsy ([id number]; 12/20/2021):   1. Left breast, 6:00: Invasive ductal carcinoma with prominent cribriform growth pattern, Aminta grade I (of III).   The submitted immunostains for SMA, p63 and CK 5/6 show absence of myoepithelial cell layer in the  tumor. The tumor is diffusely positive for ER. These findings support the above diagnosis.   2. Right breast, 10:00: Fibroadenomatoid nodule.          No results found for this or any previous visit (from the past 24 hour(s)).    Assessment & Plan:    1. Malignant neoplasm of lower-outer quadrant of left breast of female, estrogen receptor positive  - anastrozole (ARIMIDEX) 1 mg Tab; Take 1 tablet (1 mg total) by mouth once daily.  Dispense: 30 tablet; Refill: 11      Reviewed patients referring notes, imaging and pathology. Discussed diagnosis, staging, and treatment in detail with patient.   Patient with recent diagnosis of Stage 1 ER+ breast cancer. [place] and HER2 testing are still to be completed. Will follow up with pathology.   She is unsure if she would like surgery. She is concerned that operating will cause the cancer to spread. Discussed that his has not been proven to be true, and that modern surgeries are both safe and curative. 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     We discussed that Aromatase Inhibitors are the optimal treatment for estrogen positive breast cancer. We did discuss that one of the possible side effects while on an AI is bone loss or osteoporosis. To help prevent this possible side effect, we advised that she continue taking daily Calcium and Vitamin D supplements. The daily recommended doses are 1000 IU of Vitamin D and 1200mg of Calcium. She can buy these supplements, such as Oscal-D® or Caltrate®, at most local stores. If she has osteopenia or osteoporosis on bone density, we will discuss Prolia in the near future.     Joint pain is a common side effect of an AI. Pain and aching in the bones and joints can range from mild discomfort that goes away by itself, to severe achiness that may require medication. We have suggested using over-the-counter, non-steroidal anti-inflammatory medications like Ibuprofen if she were to  experience this side effect.    She would like to plan for neoadjuvant endocrine therapy while awaiting surgery discussion. If she chooses not to move forward with surgery, will plan for AI indefinitely. If she has surgery, will plan for 5-7.5 yrs of treatment depending on outcome of surgery.     Patient is agreeable to this plan. Follow up in 4 weeks for symptom review     Total time of this visit, including time spent face to face with patient and/or via video/audio, and also in preparing for today's visit for MDM and documentation. (Medical Decision Making, including consideration of possible diagnoses, management options, complex medical record review, review of diagnostic tests and information, consideration and discussion of significant complications based on comorbidities, and discussion with providers involved with the care of the patient) 90 minutes. Greater than 50% was spent face to face with the patient counseling and coordinating care.      Aliza Olvera MD  01/06/2022

## 2022-01-07 ENCOUNTER — TELEPHONE (OUTPATIENT)
Dept: SURGERY | Facility: CLINIC | Age: 82
End: 2022-01-07
Payer: MEDICARE

## 2022-01-07 NOTE — TELEPHONE ENCOUNTER
Called patient and scheduled appt for her to meet with Dr. Kruger. Rescheduled appt with Dr. Olvera, patient wanted to come on the same day. Reviewed location of appts. Patient verbalized understanding.

## 2022-01-20 LAB
COMMENT: NORMAL
FINAL PATHOLOGIC DIAGNOSIS: NORMAL
GROSS: NORMAL
Lab: NORMAL
SUPPLEMENTAL DIAGNOSIS: NORMAL

## 2022-01-25 ENCOUNTER — TELEPHONE (OUTPATIENT)
Dept: HEMATOLOGY/ONCOLOGY | Facility: CLINIC | Age: 82
End: 2022-01-25
Payer: MEDICARE

## 2022-02-02 ENCOUNTER — TELEPHONE (OUTPATIENT)
Dept: SURGERY | Facility: CLINIC | Age: 82
End: 2022-02-02
Payer: MEDICARE

## 2022-02-02 NOTE — TELEPHONE ENCOUNTER
Spoke with patient and rescheduled appt with Dr. Olvera and Dr. Kruger. Rescheduled labs from [facility] to the cancer center since patient will be here for appts. Reviewed location of appts. Patient verbalized understanding

## 2022-02-02 NOTE — TELEPHONE ENCOUNTER
Left voicemail for patient to return call to reschedule Dr. Kruger's appt. Left direct contact number

## 2022-02-15 DIAGNOSIS — C50.912 INVASIVE DUCTAL CARCINOMA OF BREAST, LEFT: Primary | ICD-10-CM

## 2022-02-16 ENCOUNTER — TELEPHONE (OUTPATIENT)
Dept: HEMATOLOGY/ONCOLOGY | Facility: CLINIC | Age: 82
End: 2022-02-16
Payer: MEDICARE

## 2022-02-17 ENCOUNTER — OFFICE VISIT (OUTPATIENT)
Dept: SURGERY | Facility: CLINIC | Age: 82
End: 2022-02-17
Payer: MEDICARE

## 2022-02-17 ENCOUNTER — OFFICE VISIT (OUTPATIENT)
Dept: HEMATOLOGY/ONCOLOGY | Facility: CLINIC | Age: 82
End: 2022-02-17
Payer: MEDICARE

## 2022-02-17 VITALS
WEIGHT: 180.69 LBS | TEMPERATURE: 98 F | BODY MASS INDEX: 29.04 KG/M2 | HEART RATE: 65 BPM | DIASTOLIC BLOOD PRESSURE: 63 MMHG | HEIGHT: 66 IN | SYSTOLIC BLOOD PRESSURE: 151 MMHG

## 2022-02-17 VITALS
BODY MASS INDEX: 29.12 KG/M2 | DIASTOLIC BLOOD PRESSURE: 75 MMHG | HEIGHT: 66 IN | OXYGEN SATURATION: 95 % | WEIGHT: 181.19 LBS | HEART RATE: 72 BPM | SYSTOLIC BLOOD PRESSURE: 156 MMHG | TEMPERATURE: 98 F | RESPIRATION RATE: 18 BRPM

## 2022-02-17 DIAGNOSIS — Z17.0 MALIGNANT NEOPLASM OF CENTRAL PORTION OF LEFT BREAST IN FEMALE, ESTROGEN RECEPTOR POSITIVE: Primary | ICD-10-CM

## 2022-02-17 DIAGNOSIS — C50.512 MALIGNANT NEOPLASM OF LOWER-OUTER QUADRANT OF LEFT BREAST OF FEMALE, ESTROGEN RECEPTOR POSITIVE: Primary | ICD-10-CM

## 2022-02-17 DIAGNOSIS — Z17.0 MALIGNANT NEOPLASM OF LOWER-OUTER QUADRANT OF LEFT BREAST OF FEMALE, ESTROGEN RECEPTOR POSITIVE: Primary | ICD-10-CM

## 2022-02-17 DIAGNOSIS — C50.112 MALIGNANT NEOPLASM OF CENTRAL PORTION OF LEFT BREAST IN FEMALE, ESTROGEN RECEPTOR POSITIVE: Primary | ICD-10-CM

## 2022-02-17 PROCEDURE — 3077F SYST BP >= 140 MM HG: CPT | Mod: CPTII,S$GLB,, | Performed by: SURGERY

## 2022-02-17 PROCEDURE — 3078F DIAST BP <80 MM HG: CPT | Mod: CPTII,S$GLB,, | Performed by: INTERNAL MEDICINE

## 2022-02-17 PROCEDURE — 3078F PR MOST RECENT DIASTOLIC BLOOD PRESSURE < 80 MM HG: ICD-10-PCS | Mod: CPTII,S$GLB,, | Performed by: INTERNAL MEDICINE

## 2022-02-17 PROCEDURE — 1126F AMNT PAIN NOTED NONE PRSNT: CPT | Mod: CPTII,S$GLB,, | Performed by: SURGERY

## 2022-02-17 PROCEDURE — 3077F SYST BP >= 140 MM HG: CPT | Mod: CPTII,S$GLB,, | Performed by: INTERNAL MEDICINE

## 2022-02-17 PROCEDURE — 1159F MED LIST DOCD IN RCRD: CPT | Mod: CPTII,S$GLB,, | Performed by: INTERNAL MEDICINE

## 2022-02-17 PROCEDURE — 99999 PR PBB SHADOW E&M-EST. PATIENT-LVL III: CPT | Mod: PBBFAC,,, | Performed by: SURGERY

## 2022-02-17 PROCEDURE — 99999 PR PBB SHADOW E&M-EST. PATIENT-LVL III: ICD-10-PCS | Mod: PBBFAC,,, | Performed by: INTERNAL MEDICINE

## 2022-02-17 PROCEDURE — 1126F PR PAIN SEVERITY QUANTIFIED, NO PAIN PRESENT: ICD-10-PCS | Mod: CPTII,S$GLB,, | Performed by: SURGERY

## 2022-02-17 PROCEDURE — 3288F PR FALLS RISK ASSESSMENT DOCUMENTED: ICD-10-PCS | Mod: CPTII,S$GLB,, | Performed by: SURGERY

## 2022-02-17 PROCEDURE — 3288F PR FALLS RISK ASSESSMENT DOCUMENTED: ICD-10-PCS | Mod: CPTII,S$GLB,, | Performed by: INTERNAL MEDICINE

## 2022-02-17 PROCEDURE — 99214 PR OFFICE/OUTPT VISIT, EST, LEVL IV, 30-39 MIN: ICD-10-PCS | Mod: S$GLB,,, | Performed by: INTERNAL MEDICINE

## 2022-02-17 PROCEDURE — 1159F PR MEDICATION LIST DOCUMENTED IN MEDICAL RECORD: ICD-10-PCS | Mod: CPTII,S$GLB,, | Performed by: INTERNAL MEDICINE

## 2022-02-17 PROCEDURE — 1159F MED LIST DOCD IN RCRD: CPT | Mod: CPTII,S$GLB,, | Performed by: SURGERY

## 2022-02-17 PROCEDURE — 3077F PR MOST RECENT SYSTOLIC BLOOD PRESSURE >= 140 MM HG: ICD-10-PCS | Mod: CPTII,S$GLB,, | Performed by: INTERNAL MEDICINE

## 2022-02-17 PROCEDURE — 99204 PR OFFICE/OUTPT VISIT, NEW, LEVL IV, 45-59 MIN: ICD-10-PCS | Mod: S$GLB,,, | Performed by: SURGERY

## 2022-02-17 PROCEDURE — 1101F PT FALLS ASSESS-DOCD LE1/YR: CPT | Mod: CPTII,S$GLB,, | Performed by: INTERNAL MEDICINE

## 2022-02-17 PROCEDURE — 1101F PR PT FALLS ASSESS DOC 0-1 FALLS W/OUT INJ PAST YR: ICD-10-PCS | Mod: CPTII,S$GLB,, | Performed by: INTERNAL MEDICINE

## 2022-02-17 PROCEDURE — 3078F PR MOST RECENT DIASTOLIC BLOOD PRESSURE < 80 MM HG: ICD-10-PCS | Mod: CPTII,S$GLB,, | Performed by: SURGERY

## 2022-02-17 PROCEDURE — 99999 PR PBB SHADOW E&M-EST. PATIENT-LVL III: CPT | Mod: PBBFAC,,, | Performed by: INTERNAL MEDICINE

## 2022-02-17 PROCEDURE — 3077F PR MOST RECENT SYSTOLIC BLOOD PRESSURE >= 140 MM HG: ICD-10-PCS | Mod: CPTII,S$GLB,, | Performed by: SURGERY

## 2022-02-17 PROCEDURE — 1160F RVW MEDS BY RX/DR IN RCRD: CPT | Mod: CPTII,S$GLB,, | Performed by: SURGERY

## 2022-02-17 PROCEDURE — 3288F FALL RISK ASSESSMENT DOCD: CPT | Mod: CPTII,S$GLB,, | Performed by: SURGERY

## 2022-02-17 PROCEDURE — 99204 OFFICE O/P NEW MOD 45 MIN: CPT | Mod: S$GLB,,, | Performed by: SURGERY

## 2022-02-17 PROCEDURE — 99214 OFFICE O/P EST MOD 30 MIN: CPT | Mod: S$GLB,,, | Performed by: INTERNAL MEDICINE

## 2022-02-17 PROCEDURE — 1101F PT FALLS ASSESS-DOCD LE1/YR: CPT | Mod: CPTII,S$GLB,, | Performed by: SURGERY

## 2022-02-17 PROCEDURE — 3288F FALL RISK ASSESSMENT DOCD: CPT | Mod: CPTII,S$GLB,, | Performed by: INTERNAL MEDICINE

## 2022-02-17 PROCEDURE — 1159F PR MEDICATION LIST DOCUMENTED IN MEDICAL RECORD: ICD-10-PCS | Mod: CPTII,S$GLB,, | Performed by: SURGERY

## 2022-02-17 PROCEDURE — 3078F DIAST BP <80 MM HG: CPT | Mod: CPTII,S$GLB,, | Performed by: SURGERY

## 2022-02-17 PROCEDURE — 99999 PR PBB SHADOW E&M-EST. PATIENT-LVL III: ICD-10-PCS | Mod: PBBFAC,,, | Performed by: SURGERY

## 2022-02-17 PROCEDURE — 1160F PR REVIEW ALL MEDS BY PRESCRIBER/CLIN PHARMACIST DOCUMENTED: ICD-10-PCS | Mod: CPTII,S$GLB,, | Performed by: SURGERY

## 2022-02-17 PROCEDURE — 1126F PR PAIN SEVERITY QUANTIFIED, NO PAIN PRESENT: ICD-10-PCS | Mod: CPTII,S$GLB,, | Performed by: INTERNAL MEDICINE

## 2022-02-17 PROCEDURE — 1126F AMNT PAIN NOTED NONE PRSNT: CPT | Mod: CPTII,S$GLB,, | Performed by: INTERNAL MEDICINE

## 2022-02-17 PROCEDURE — 1101F PR PT FALLS ASSESS DOC 0-1 FALLS W/OUT INJ PAST YR: ICD-10-PCS | Mod: CPTII,S$GLB,, | Performed by: SURGERY

## 2022-02-17 NOTE — PROGRESS NOTES
[facility] Note:      Chief Complaint:   Encounter Diagnosis   Name Primary?   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 female who presents today for follow up of breast cancer after starting on NAET. Patient is still unsure if she wants surgery, but is meeting with Dr Kruger today. She is tolerating anastrazole well without any issues. Continues to teach.     Oncology History  Patient felt a mass in the left breast in October 2021  Diagnostic mammogram 10/25/21 demonstrated bilateral breast masses with recommendation for biopsy  Bilateral breast biopsy 12/20/21 showed a R breast IDC, grade 1, diffuse staining for estrogen and a L breast fibroadenoma   Patient's pathology was initially sent to [place], therefore no receptors were initially run    Patient has a history of HRT which she took for 10 years. She stopped these >10 years ago.         Patient Active Problem List   Diagnosis    Essential hypertension    Cholelithiasis    Refused influenza vaccine    Overweight (BMI 25.0-29.9)    Pre-diabetes    Mixed hyperlipidemia    Poor dentition    Chronic left shoulder pain    Decreased strength of upper extremity    Decreased functional mobility    Impaired range of motion of cervical spine    Malignant neoplasm of lower-outer quadrant of left breast of female, estrogen receptor positive       Current Outpatient Medications   Medication Instructions    amLODIPine (NORVASC) 10 mg, Oral, Daily    anastrozole (ARIMIDEX) 1 mg, Oral, Daily    aspirin (ECOTRIN) 81 mg, Oral,  "Daily    atorvastatin (LIPITOR) 40 mg, Oral, Daily    cyclobenzaprine (FLEXERIL) 5 mg, Oral, 3 times daily PRN    diclofenac sodium (VOLTAREN) 2 g, Topical (Top), 4 times daily PRN    fish oil-omega-3 fatty acids 300-1,000 mg capsule 1 capsule, Oral, Daily    hydroCHLOROthiazide (HYDRODIURIL) 25 mg, Oral, Daily    LIDOcaine (LIDODERM) 5 % 1 patch, Transdermal, Daily, Remove & Discard patch within 12 hours or as directed by MD Eli freemanrtan (DIOVAN) 320 mg, Oral, Daily       Review of Systems:   Review of Systems   Constitutional: Negative.    HENT: Negative.    Respiratory: Negative.    Cardiovascular: Negative.    Gastrointestinal: Negative.    Musculoskeletal: Negative.    Neurological: Negative.    Endo/Heme/Allergies: Negative.    All other systems reviewed and are negative.      PHYSICAL EXAM:  BP (!) 156/75   Pulse 72   Temp 98.3 °F (36.8 °C) (Oral)   Resp 18   Ht 5' 6" (1.676 m)   Wt 82.2 kg (181 lb 3.5 oz)   SpO2 95%   BMI 29.25 kg/m²     General Appearance:    Alert, cooperative, no distress, appears stated age   Lungs:     Clear to auscultation bilaterally, respirations unlabored    Heart:    Regular rate and rhythm, S1 and S2 normal   Breast Exam:    Bilateral breast normal. Unable to palpate L breast mass. No right masses, no tenderness, no skin or nipple abnormalities.  No lymphadenopathy.    Abdomen:     Soft, non-tender, bowel sounds active, no masses, no organomegaly   Extremities:   Extremities normal, atraumatic, no cyanosis or edema   Pulses:   2+ and symmetric all extremities   Skin:   Skin color, texture, turgor normal, no rashes or lesions   Lymph nodes:   Cervical, supraclavicular, and axillary nodes normal           Pertinent Labs & Imaging:  Diagnostic mammogram 10/25/21:   Impression:  Left  Mass: Left breast mass at the anterior 6 o'clock position. Assessment: 4 - Suspicious finding. Biopsy is recommended.   Right  Mass: Right breast 6 mm mass at the 10 o'clock position. " Assessment: 4 - Suspicious finding. Biopsy is recommended.   BI-RADS Category: Overall: 4 - Suspicious    Bilateral breast ultrasound 12/20/21  1. BREAST, LEFT, 06:00, BIOPSY:   -. Invasive ductal carcinoma, see [place] Consultative Report below.   - Size of invasive carcinoma:  3 MM in greatest linear dimension within a single core biopsy fragment.   - [place] Histologic Score: Grade 1 of 3.                     Tubule Formation:  2                     Nuclear Pleomorphism:  2                     Mitotic Activity:  1   - No definitive in-situ component identified.   - No lymphovascular invasion.   - Microcalcifications:  Not identified.   - Breast biomarkers will be performed upon return of materials [facility].   2. BREAST, RIGHT, 10:00, BIOPSY:   - Benign breast tissue with stromal fibrosis and fibroadenomatoid nodules.   - Microcalcifications:  Not identified.   - Negative for atypia or malignancy.     [place] FINAL DIAGNOSIS:   Interpretation   Breast, core biopsy (12/20/2021):   1. Left breast, 6:00: Invasive ductal carcinoma with prominent cribriform growth pattern, [place] grade I (of III).   The submitted immunostains for SMA, p63 and CK 5/6 show absence of myoepithelial cell layer in the tumor. The tumor is diffusely positive for ER. These findings support the above diagnosis.   2. Right breast, 10:00: Fibroadenomatoid nodule.          No results found for this or any previous visit (from the past 24 hour(s)).    Assessment & Plan:    1. Malignant neoplasm of lower-outer quadrant of left breast of female, estrogen receptor positive    Patient on NAET with anastrazole. Overall she is tolerating this well without major side effects.   She is seeing surgery today for evaluattion. If she chooses not to move forward with surgery, will plan for AI indefinitely. If she has surgery, will plan for 5 yrs of treatment depending on outcome of surgery.     Patient is agreeable to this plan. Follow up in 8 weeks  for symptom review       Med Onc Chart Routing      Follow up with physician 2 months.   Follow up with CHRISTINA    Labs None   Lab interval:     Imaging None      Pharmacy appointment    Other referrals            Total time of this visit, including time spent face to face with patient and/or via video/audio, and also in preparing for today's visit for MDM and documentation. (Medical Decision Making, including consideration of possible diagnoses, management options, complex medical record review, review of diagnostic tests and information, consideration and discussion of significant complications based on comorbidities, and discussion with providers involved with the care of the patient) 30 minutes. Greater than 50% was spent face to face with the patient counseling and coordinating care.      Aliza Olvera MD  02/17/2022

## 2022-02-17 NOTE — PROGRESS NOTES
"Breast Surgery  [facility]  Department of Surgery      REFERRING PROVIDER: Aliza Olvera MD  [address] [place] [address]    Chief Complaint: Invasive Ductal Cancer    Subjective:      Patient ID: Karis Briceño is a 81 y.o. female who presents with left breast IDC.     She initially felt a "marble" in her L breast last . She then had follow-up mammogram/US (10/25/21) that showed a 2.2 cm mass at 6 OC 5 CFM in the left breast, as well as a 1.1 cm mass in the right breast. A stereotactic biopsy was performed on 21 with pathology revealing infiltrating ductal carcinoma of the left breast and fibroadenoma of the right breast. She has previously met with Heme/Onc and started on Anastrozole.     Patient does routinely do self breast exams.  Patient has noted a change on breast exam.  Patient denies nipple discharge. Patient denies to previous breast biopsy. Patient denies a personal history of breast cancer. Denies family hx of cancers.     Findings at that time were the following:   Tumor size: 2.2 cm   Tumor ndgndrndanddndend:nd nd2nd Estrogen Receptor: +   Progesterone Receptor: +   Her-2 sebastian: -   Lymph node status: negative   Lymphatic invasion: negative     PMHx: HTN, HLD  PSHx: hysterectomy  She continues to teach, remains active.     GYN History:  Age of menarche was 13. Hysterectomy in her 40s. Patient admits to hormonal therapy after her hysterectomy, has not received since around 60 years old. Patient is . Patient did not breast feed.    Past Medical History:   Diagnosis Date    Gallstone pancreatitis 2016    Gallstones     Hyperlipidemia     Hypertension     Malignant neoplasm of lower-outer quadrant of left breast of female, estrogen receptor positive 2022     Past Surgical History:   Procedure Laterality Date    HYSTERECTOMY       Current Outpatient Medications on File Prior to Visit   Medication Sig Dispense Refill    amLODIPine (NORVASC) 10 MG tablet Take 1 tablet " (10 mg total) by mouth once daily. 90 tablet 3    anastrozole (ARIMIDEX) 1 mg Tab Take 1 tablet (1 mg total) by mouth once daily. 30 tablet 11    aspirin (ECOTRIN) 81 MG EC tablet Take 1 tablet (81 mg total) by mouth once daily. 90 tablet 3    atorvastatin (LIPITOR) 40 MG tablet Take 1 tablet (40 mg total) by mouth once daily. 90 tablet 3    cyclobenzaprine (FLEXERIL) 5 MG tablet Take 1 tablet (5 mg total) by mouth 3 (three) times daily as needed for Muscle spasms. 30 tablet 3    diclofenac sodium (VOLTAREN) 1 % Gel Apply 2 g topically 4 (four) times daily as needed. 100 g 11    fish oil-omega-3 fatty acids 300-1,000 mg capsule Take 1 capsule by mouth once daily.      hydroCHLOROthiazide (HYDRODIURIL) 25 MG tablet Take 1 tablet (25 mg total) by mouth once daily. 90 tablet 3    LIDOcaine (LIDODERM) 5 % Place 1 patch onto the skin once daily. Remove & Discard patch within 12 hours or as directed by MD 15 patch 1    valsartan (DIOVAN) 320 MG tablet Take 1 tablet (320 mg total) by mouth once daily. 90 tablet 3     No current facility-administered medications on file prior to visit.     Social History     Socioeconomic History    Marital status: Single   Tobacco Use    Smoking status: Never Smoker    Smokeless tobacco: Never Used   Substance and Sexual Activity    Alcohol use: Yes     Comment: socially    Drug use: No     Family History   Problem Relation Age of Onset    Heart attack Father     Heart disease Sister     No Known Problems Mother     Skin cancer Neg Hx         Review of Systems   Constitutional: Negative for chills and fever.   HENT: Negative for hearing loss and voice change.    Eyes: Negative for discharge and redness.   Respiratory: Negative for cough and shortness of breath.    Cardiovascular: Negative for chest pain.   Gastrointestinal: Negative for abdominal pain, diarrhea, nausea and vomiting.   Musculoskeletal: Negative for gait problem.        L breast mass   Skin: Negative for  "pallor.   Psychiatric/Behavioral: Negative for behavioral problems.     Objective:   BP (!) 151/63 (BP Location: Right arm, Patient Position: Sitting)   Pulse 65   Temp 98 °F (36.7 °C) (Oral)   Ht 5' 6" (1.676 m)   Wt 81.9 kg (180 lb 10.7 oz)   BMI 29.16 kg/m²     Physical Exam   Constitutional: She is oriented to person, place, and time. She appears well-developed and well-nourished. No distress.   HENT:   Head: Normocephalic and atraumatic.   Eyes: Conjunctivae and EOM are normal.   Cardiovascular: Normal rate and regular rhythm.    Pulmonary/Chest: No respiratory distress. Right breast exhibits no inverted nipple, no mass, no nipple discharge, no skin change and no tenderness. Left breast exhibits mass. Left breast exhibits no inverted nipple, no nipple discharge, no skin change and no tenderness. Breasts are symmetrical.       Abdominal: Soft. She exhibits no distension. There is no abdominal tenderness.   Musculoskeletal: Normal range of motion.   Lymphadenopathy:     She has no cervical adenopathy.   Neurological: She is alert and oriented to person, place, and time.   Skin: Skin is warm and dry.     Psychiatric: She has a normal mood and affect. Her behavior is normal.       Radiology review: Images personally reviewed by me in the clinic.   MMG/US 10/25/21  Left  Mammo Digital Diagnostic Bilat with Matt  There is a round mass seen in the left breast at 6 o'clock in the anterior depth.      US Breast Bilateral Limited  There is a 22 mm x 20 mm x 15 mm oval, complex cystic and solid mass with circumscribed margins seen in the left breast at 6 o'clock, 5 cm from the nipple. Solid component with vascularity. No axillary adenopathy.      Right  Mammo Digital Diagnostic Bilat with Matt  There is a 6 mm low density mass seen in the right breast at 10 o'clock.      US Breast Bilateral Limited  There is an 11 mm x 10 mm x 5 mm oval, heterogeneous mass with no posterior features seen in the right breast at 10 " o'clock, 6 cm from the nipple. Could also be a lymph node. The axilla appears normal , no adenopathy.      Impression:  Left  Mass: Left breast mass at the anterior 6 o'clock position. Assessment: 4 - Suspicious finding. Biopsy is recommended.      Right  Mass: Right breast 6 mm mass at the 10 o'clock position. Assessment: 4 - Suspicious finding. Biopsy is recommended.      BI-RADS Category:   Overall: 4 - Suspicious    Path  1. BREAST, LEFT, 06:00, BIOPSY:  -. Invasive ductal carcinoma, see [place] Consultative Report below.  - Size of invasive carcinoma: 3 MM in greatest linear dimension within a single core biopsy fragment.  - [place] Histologic Score: Grade 1 of 3.  Tubule Formation: 2  Nuclear Pleomorphism: 2  Mitotic Activity: 1  - No definitive in-situ component identified.  - No lymphovascular invasion.  - Microcalcifications: Not identified.  - Breast biomarkers will be performed upon return of materials from [facility].  BREAST BIOMARKERS:  ER: Positive (99% of tumor cells demonstrate strong nuclear immunoreactivity).  [place]: Positive (60% of tumor cells demonstrate moderate to strong nuclear immunoreactivity).  YSS2VDF: Negative (1+).  Ki-67 proliferative index: 10%.    2. BREAST, RIGHT, 10:00, BIOPSY:  - Benign breast tissue with stromal fibrosis and fibroadenomatoid nodules.  - Microcalcifications: Not identified.  - Negative for atypia or malignancy.    Assessment:       Invasive Ductal Carcinoma, Left Breast   Plan:     - Recommend continuing anastrozole therapy.   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 Will proceed with neoadjuvant endocrine therapy.   - Plan for repeat MMG/US in May 2022    Left breast cancer under treatment with an AI  Patient wants to defer surgery until summer (when school is out - she is a )  Will use AI as neoadjuvant enodcrine therapy and re-assess in May

## 2022-02-21 ENCOUNTER — DOCUMENTATION ONLY (OUTPATIENT)
Dept: HEMATOLOGY/ONCOLOGY | Facility: CLINIC | Age: 82
End: 2022-02-21
Payer: MEDICARE

## 2022-02-21 NOTE — NURSING
Nurse Navigator Note:     Met with patient during her consult with Dr. Kruger.  Patient and I reviewed the information she discussed with Dr. Kruger, including treatment options, diagnosis, and future plans for workup. Patient and I went through the new patient binder, explained some of the information and why it is provided.     Also offered patient consults with our other specialty clinics: Dr. Jones for gynecological health during treatment, our breast physical therapy department for pre-op and post-operative assessments, Dr. De Jesus for psychological support, and Tiffany Calhoun for nutritional counseling. Explained to patient that all of these support services are completely optional. Discussed that physical therapy may call patient to offer pre-op appt, and what that appt would entail.     Patient was given a copy of her appointments, Dr. Kruger's card, and my card. Encouraged her to call me if she has any questions or concerns or would like to schedule any additional appointments. Verbalized understanding of all information.   Oncology Navigation   Intake  Date of Diagnosis: 12/20/2021  Cancer Type: Breast  Internal / External Referral: Internal  Referral Source: Zev  Date of Referral: 12/29/2021  Initial Nurse Navigator Contact: 2/17/2022  Referral to Initial Contact Timeline (days): 50  Date Worked: 2/21/2022  First Appointment Available: 2/17/2022  Appointment Date: 2/17/2022  First Available Date vs. Scheduled Date (days): 0     Treatment  Current Status: Staging work-up    Surgical Oncologist: Saskia  Consult Date: 2/17/2022    Medical Oncologist: ronnie  Consult Date: 2/17/2022       Procedures: Biopsy; Ultrasound; Mammogram  Biopsy Schedule Date: 12/20/2021  Mammo Schedule Date: 10/25/2021  Ultrasound Schedule Date: 10/25/2021       ER: Positive  FL: Positive  Her2: Negative           Acuity  Treatment Tolerability: Has not started treatment yet/treatment fully completed and side effects  resolved   Needed: No  Support: Patient reports adequate support system  Verbalizes Financial Concerns: No  Transportation: Adequate transportation for treatment  History of noncompliance/frequent no shows and cancellations: No  Verbalizes the need for more education: No  Navigation Acuity: 0     Follow Up  Follow up in about 3 months (around 5/21/2022) for pt wants to wait until summer for surgery.  she will do vahid and  with Dr Olvera.

## 2022-02-23 DIAGNOSIS — C50.112 MALIGNANT NEOPLASM OF CENTRAL PORTION OF LEFT BREAST IN FEMALE, ESTROGEN RECEPTOR POSITIVE: Primary | ICD-10-CM

## 2022-02-23 DIAGNOSIS — Z17.0 MALIGNANT NEOPLASM OF CENTRAL PORTION OF LEFT BREAST IN FEMALE, ESTROGEN RECEPTOR POSITIVE: Primary | ICD-10-CM

## 2022-03-04 ENCOUNTER — LAB VISIT (OUTPATIENT)
Dept: LAB | Facility: OTHER | Age: 82
End: 2022-03-04
Attending: INTERNAL MEDICINE
Payer: MEDICARE

## 2022-03-04 DIAGNOSIS — E78.2 MIXED HYPERLIPIDEMIA: ICD-10-CM

## 2022-03-04 DIAGNOSIS — Z00.00 ANNUAL PHYSICAL EXAM: ICD-10-CM

## 2022-03-04 DIAGNOSIS — R73.03 PRE-DIABETES: ICD-10-CM

## 2022-03-04 LAB
BASOPHILS # BLD AUTO: 0.03 K/UL (ref 0–0.2)
BASOPHILS NFR BLD: 0.6 % (ref 0–1.9)
CHOLEST SERPL-MCNC: 133 MG/DL (ref 120–199)
CHOLEST/HDLC SERPL: 2.2 {RATIO} (ref 2–5)
DIFFERENTIAL METHOD: ABNORMAL
EOSINOPHIL # BLD AUTO: 0.2 K/UL (ref 0–0.5)
EOSINOPHIL NFR BLD: 3.2 % (ref 0–8)
ERYTHROCYTE [DISTWIDTH] IN BLOOD BY AUTOMATED COUNT: 12.6 % (ref 11.5–14.5)
ESTIMATED AVG GLUCOSE: 128 MG/DL (ref 68–131)
HBA1C MFR BLD: 6.1 % (ref 4–5.6)
HCT VFR BLD AUTO: 41.3 % (ref 37–48.5)
HDLC SERPL-MCNC: 60 MG/DL (ref 40–75)
HDLC SERPL: 45.1 % (ref 20–50)
HGB BLD-MCNC: 13.5 G/DL (ref 12–16)
IMM GRANULOCYTES # BLD AUTO: 0.01 K/UL (ref 0–0.04)
IMM GRANULOCYTES NFR BLD AUTO: 0.2 % (ref 0–0.5)
LDLC SERPL CALC-MCNC: 60.4 MG/DL (ref 63–159)
LYMPHOCYTES # BLD AUTO: 1.8 K/UL (ref 1–4.8)
LYMPHOCYTES NFR BLD: 35.5 % (ref 18–48)
MCH RBC QN AUTO: 30.8 PG (ref 27–31)
MCHC RBC AUTO-ENTMCNC: 32.7 G/DL (ref 32–36)
MCV RBC AUTO: 94 FL (ref 82–98)
MONOCYTES # BLD AUTO: 0.4 K/UL (ref 0.3–1)
MONOCYTES NFR BLD: 7.1 % (ref 4–15)
NEUTROPHILS # BLD AUTO: 2.6 K/UL (ref 1.8–7.7)
NEUTROPHILS NFR BLD: 53.4 % (ref 38–73)
NONHDLC SERPL-MCNC: 73 MG/DL
NRBC BLD-RTO: 0 /100 WBC
PLATELET # BLD AUTO: 215 K/UL (ref 150–450)
PMV BLD AUTO: 8.9 FL (ref 9.2–12.9)
RBC # BLD AUTO: 4.39 M/UL (ref 4–5.4)
TRIGL SERPL-MCNC: 63 MG/DL (ref 30–150)
WBC # BLD AUTO: 4.93 K/UL (ref 3.9–12.7)

## 2022-03-04 PROCEDURE — 36415 COLL VENOUS BLD VENIPUNCTURE: CPT | Performed by: INTERNAL MEDICINE

## 2022-03-04 PROCEDURE — 83036 HEMOGLOBIN GLYCOSYLATED A1C: CPT | Performed by: INTERNAL MEDICINE

## 2022-03-04 PROCEDURE — 80061 LIPID PANEL: CPT | Performed by: INTERNAL MEDICINE

## 2022-03-04 PROCEDURE — 85025 COMPLETE CBC W/AUTO DIFF WBC: CPT | Performed by: INTERNAL MEDICINE

## 2022-03-28 ENCOUNTER — TELEPHONE (OUTPATIENT)
Dept: HEMATOLOGY/ONCOLOGY | Facility: CLINIC | Age: 82
End: 2022-03-28
Payer: MEDICARE

## 2022-03-31 DIAGNOSIS — E78.2 MIXED HYPERLIPIDEMIA: ICD-10-CM

## 2022-03-31 DIAGNOSIS — I10 ESSENTIAL HYPERTENSION: ICD-10-CM

## 2022-03-31 RX ORDER — AMLODIPINE BESYLATE 10 MG/1
10 TABLET ORAL DAILY
Qty: 90 TABLET | Refills: 2 | Status: SHIPPED | OUTPATIENT
Start: 2022-03-31 | End: 2023-04-03 | Stop reason: SDUPTHER

## 2022-03-31 RX ORDER — ATORVASTATIN CALCIUM 40 MG/1
40 TABLET, FILM COATED ORAL DAILY
Qty: 90 TABLET | Refills: 1 | Status: SHIPPED | OUTPATIENT
Start: 2022-03-31 | End: 2022-09-27 | Stop reason: SDUPTHER

## 2022-03-31 RX ORDER — HYDROCHLOROTHIAZIDE 25 MG/1
25 TABLET ORAL DAILY
Qty: 90 TABLET | Refills: 2 | Status: SHIPPED | OUTPATIENT
Start: 2022-03-31 | End: 2023-04-03 | Stop reason: SDUPTHER

## 2022-03-31 RX ORDER — VALSARTAN 320 MG/1
320 TABLET ORAL DAILY
Qty: 90 TABLET | Refills: 2 | Status: SHIPPED | OUTPATIENT
Start: 2022-03-31 | End: 2023-04-03 | Stop reason: SDUPTHER

## 2022-03-31 NOTE — TELEPHONE ENCOUNTER
----- Message from Diana Caraballo sent at 3/31/2022  9:40 AM CDT -----  Regarding: Refill request  Type:  RX Refill Request    Who Called: RAAY MCPHERSON [[id number]]    Refill or New Rx: Refill     RX Name and Strength: valsartan (DIOVAN) 320 MG tablet  atorvastatin (LIPITOR) 40 MG tablet  hydroCHLOROthiazide (HYDRODIURIL) 25 MG tablet  amLODIPine (NORVASC) 10 MG tablet    How is the patient currently taking it? (ex. 1XDay) 1xday     Is this a 30 day or 90 day RX: 90 days     Preferred Pharmacy with phone number: [facility]    Local or Mail Order: local     Ordering Provider: Luis Champion Call Back Number: [phone]    Additional Information: pt is out of these medications

## 2022-03-31 NOTE — TELEPHONE ENCOUNTER
Refill Routing Note   Medication(s) are not appropriate for processing by [facility] for the following reason(s):      - Required vitals are abnormal    [facility] action(s):  Defer  Approve          --->Care Gap information included in message below if applicable.   Medication reconciliation completed: No   Automatic Epic Generated Protocol Data:    Orders Placed This Encounter    atorvastatin (LIPITOR) 40 MG tablet      Requested Prescriptions   Pending Prescriptions Disp Refills    valsartan (DIOVAN) 320 MG tablet 90 tablet 1     Sig: Take 1 tablet (320 mg total) by mouth once daily.        Cardiovascular:  Angiotensin Receptor Blockers Failed - 3/31/2022  9:52 AM        Failed - Last BP in normal range within 360 days     BP Readings from Last 3 Encounters:   02/17/22 (!) 151/63   02/17/22 (!) 156/75   01/06/22 (!) 171/77                 Passed - Patient is at least 18 years old        Passed - Valid encounter within last 15 months     Recent Visits  Date Type Provider Dept   11/24/21 Office Visit Vandana Smith MD [facility] Internal Medicine   05/26/21 Office Visit Vandana Smith MD [facility] Internal Medicine   12/18/20 Office Visit Vandana Smith MD [facility] Internal Medicine   06/10/20 Office Visit Vandana Smith MD [facility] Internal Medicine   Showing recent visits within past 720 days and meeting all other requirements  Future Appointments  No visits were found meeting these conditions.  Showing future appointments within next 150 days and meeting all other requirements        Future Appointments                In 2 weeks MD Diego Ramirez [facility]- Hematology Oncology, Tmi Thapa    In 1 month EILEEN CALLEJAS MAMMO3 [facility] Entry, Tim Thapa    In 1 month EILEEN CALLEJAS MAMMO US1 [facility] Entry, Tim Thapa    In 1 month Carlos Kruger MD [facility], Tim Thapa    In 2 months Vandana Smith MD [facility] - Internal  Medicine, [facility]             Passed - Matches previous order         Previous Authorizing Provider: Vandana Smith MD (valsartan (DIOVAN) 320 MG tablet)  Previous Authorizing Provider: Vandana Smith MD (hydroCHLOROthiazide (HYDRODIURIL) 25 MG tablet)  Previous Authorizing Provider: Vandana Smith MD (atorvastatin (LIPITOR) 40 MG tablet)  Previous Authorizing Provider: Vandana Smith MD (amLODIPine (NORVASC) 10 MG tablet)  Previous Pharmacy: [facility]  Previous Pharmacy: [facility]  Previous Pharmacy: [facility]  Previous Pharmacy: [facility]            Passed - No ED/Hospital visits since last PCP visit     Last PCP Visit: 11/24/2021 Last Admission: 9/3/2019 Last ED Visit: 3/17/2021          Passed - Cr is 1.39 or below and within 360 days       Lab Results   Component Value Date    CREATININE 0.8 10/11/2021    CREATININE 0.9 01/29/2021    CREATININE 1.1 07/20/2020              Passed - K is 5.2 or below and within 360 days       Potassium   Date Value Ref Range Status   10/11/2021 3.8 3.5 - 5.1 mmol/L Final   01/29/2021 4.0 3.5 - 5.1 mmol/L Final   07/20/2020 3.5 3.5 - 5.1 mmol/L Final              Passed - eGFR within 360 days     Lab Results   Component Value Date    EGFRNONAA >60 10/11/2021    EGFRNONAA >60 01/29/2021    EGFRNONAA 48 (A) 07/20/2020                   hydroCHLOROthiazide (HYDRODIURIL) 25 MG tablet 90 tablet 1     Sig: Take 1 tablet (25 mg total) by mouth once daily.        Cardiovascular: Diuretics - Thiazide Failed - 3/31/2022  9:52 AM        Failed - Last BP in normal range within 360 days     BP Readings from Last 3 Encounters:   02/17/22 (!) 151/63   02/17/22 (!) 156/75   01/06/22 (!) 171/77                 Passed - Patient is at least 18 years old        Passed - Valid encounter within last 15 months     Recent Visits  Date Type Provider Dept   11/24/21 Office Visit Vandana Smith MD [facility] Internal Medicine   05/26/21 Office Visit  Vandana Smith MD [facility] Internal Medicine   12/18/20 Office Visit Vandana Smith MD [facility] Internal Medicine   06/10/20 Office Visit Vandana Smith MD [facility] Internal Medicine   Showing recent visits within past 720 days and meeting all other requirements  Future Appointments  No visits were found meeting these conditions.  Showing future appointments within next 150 days and meeting all other requirements        Future Appointments                In 2 weeks Aliza Olvera MD [facility], Tim Thapa    In 1 month [facility] MAMMO3 [facility] Entry, Tim Thapa    In 1 month [facility] MAMMO US1 [facility] Entry, Tim Thapa    In 1 month Carlos Kruger MD [facility] [place] Entry, Tim Thapa    In 2 months Vandana Smith MD [facility] - Internal Medicine, [facility]             Passed - Matches previous order         Previous Authorizing Provider: Vandana Smith MD (valsartan (DIOVAN) 320 MG tablet)  Previous Authorizing Provider: Vandana Smith MD (hydroCHLOROthiazide (HYDRODIURIL) 25 MG tablet)  Previous Authorizing Provider: Vandana Smith MD (atorvastatin (LIPITOR) 40 MG tablet)  Previous Authorizing Provider: Vandana Smith MD (amLODIPine (NORVASC) 10 MG tablet)  Previous Pharmacy: [facility]  Previous Pharmacy: [facility]  Previous Pharmacy: [facility]  Previous Pharmacy: [facility]            Passed - No ED/Hospital visits since last PCP visit     Last PCP Visit: 11/24/2021 Last Admission: 9/3/2019 Last ED Visit: 3/17/2021          Passed - Cr is 1.39 or below and within 360 days       Lab Results   Component Value Date    CREATININE 0.8 10/11/2021    CREATININE 0.9 01/29/2021    CREATININE 1.1 07/20/2020              Passed - K in normal range and within 360 days       Potassium   Date Value Ref Range Status   10/11/2021 3.8 3.5 - 5.1 mmol/L Final    01/29/2021 4.0 3.5 - 5.1 mmol/L Final   07/20/2020 3.5 3.5 - 5.1 mmol/L Final              Passed - Na is between 130 and 148 and within 360 days       Sodium   Date Value Ref Range Status   10/11/2021 142 136 - 145 mmol/L Final   01/29/2021 142 136 - 145 mmol/L Final   07/20/2020 140 136 - 145 mmol/L Final              Passed - eGFR within 360 days     Lab Results   Component Value Date    EGFRNONAA >60 10/11/2021    EGFRNONAA >60 01/29/2021    EGFRNONAA 48 (A) 07/20/2020                   amLODIPine (NORVASC) 10 MG tablet 90 tablet 2     Sig: Take 1 tablet (10 mg total) by mouth once daily.        Cardiovascular:  Calcium Channel Blockers Failed - 3/31/2022 10:00 AM        Failed - Last BP in normal range within 360 days     BP Readings from Last 3 Encounters:   02/17/22 (!) 151/63   02/17/22 (!) 156/75   01/06/22 (!) 171/77                 Passed - Patient is at least 18 years old        Passed - Valid encounter within last 15 months     Recent Visits  Date Type Provider Dept   11/24/21 Office Visit Vandana Smith MD [facility] Internal Medicine   05/26/21 Office Visit Vandana Smith MD [facility] Internal Medicine   12/18/20 Office Visit Vandana Smith MD [facility] Internal Medicine   06/10/20 Office Visit Vandana Smith MD [facility] Internal Medicine   Showing recent visits within past 720 days and meeting all other requirements  Future Appointments  No visits were found meeting these conditions.  Showing future appointments within next 150 days and meeting all other requirements        Future Appointments                In 2 weeks MD Diego Ramirez [facility]- Hematology Oncology, Tim Thapa    In 1 month EILEEN CALLEJAS [facility], Tim Th[facility]    In 1 month EILEEN CALLEJAS MAMMO US1 [facility] Entry, Tim Thapa    In 1 month Carlos Kruger MD [facility] Entry, Tim Thapa    In 2 months Vandana Smith MD [facility] - Internal  Medicine, [facility]             Passed - Matches previous order         Previous Authorizing Provider: Vandana Smith MD (valsartan (DIOVAN) 320 MG tablet)  Previous Authorizing Provider: Vandana Smith MD (hydroCHLOROthiazide (HYDRODIURIL) 25 MG tablet)  Previous Authorizing Provider: Vandana Smith MD (atorvastatin (LIPITOR) 40 MG tablet)  Previous Authorizing Provider: Vandana Smith MD (amLODIPine (NORVASC) 10 MG tablet)  Previous Pharmacy: [facility]  Previous Pharmacy: [facility]  Previous Pharmacy: [facility]  Previous Pharmacy: [facility]            Passed - No ED/Hospital visits since last PCP visit     Last PCP Visit: 11/24/2021 Last Admission: 9/3/2019 Last ED Visit: 3/17/2021            Signed Prescriptions Disp Refills    atorvastatin (LIPITOR) 40 MG tablet 90 tablet 1     Sig: Take 1 tablet (40 mg total) by mouth once daily.        Cardiovascular:  Antilipid - Statins Passed - 3/31/2022 10:00 AM        Passed - Patient is at least 18 years old        Passed - Valid encounter within last 15 months     Recent Visits  Date Type Provider Dept   11/24/21 Office Visit Vandana Smith MD [facility] Internal Medicine   05/26/21 Office Visit Vandana Smith MD [facility] Internal Medicine   12/18/20 Office Visit Vandana Smith MD [facility] Internal Medicine   06/10/20 Office Visit Vandana Smith MD [facility] Internal Medicine   Showing recent visits within past 720 days and meeting all other requirements  Future Appointments  No visits were found meeting these conditions.  Showing future appointments within next 150 days and meeting all other requirements        Future Appointments                In 2 weeks Aliza Olvera MD [facility]- Hematology Oncology, Tim Thapa    In 1 month EILEEN CALLEJAS MAMMO3 [facility], Tim Thapa    In 1 month EILEEN CALLEJAS MAMMO US1 [facility], Tim Thapa    In  1 month [facility] Entry, Tim Thapa    In 2 months Vandana Smith MD [facility], [facility]             Passed - Matches previous order         Previous Authorizing Provider: Vandana Smith MD (valsartan (DIOVAN) 320 MG tablet)  Previous Authorizing Provider: Vandana Smith MD (hydroCHLOROthiazide (HYDRODIURIL) 25 MG tablet)  Previous Authorizing Provider: Vandana Smith MD (atorvastatin (LIPITOR) 40 MG tablet)  Previous Authorizing Provider: Vandana Smith MD (amLODIPine (NORVASC) 10 MG tablet)  Previous Pharmacy: [facility]  Previous Pharmacy: [facility]  Previous Pharmacy: [facility]  Previous Pharmacy: [facility]            Passed - No ED/Hospital visits since last PCP visit     Last PCP Visit: 11/24/2021 Last Admission: 9/3/2019 Last ED Visit: 3/17/2021          Passed - ALT is 131 or below and within 360 days       ALT   Date Value Ref Range Status   10/11/2021 24 10 - 44 U/L Final   01/29/2021 18 10 - 44 U/L Final   07/20/2020 24 10 - 44 U/L Final              Passed - AST is 119 or below and within 360 days       AST   Date Value Ref Range Status   10/11/2021 28 10 - 40 U/L Final   01/29/2021 24 10 - 40 U/L Final   07/20/2020 29 10 - 40 U/L Final              Passed - Total Cholesterol within 360 days       Lab Results   Component Value Date    CHOL 133 03/04/2022    CHOL 149 01/29/2021    CHOL 220 (H) 09/13/2019              Passed - LDL within 360 days     LDL Cholesterol   Date Value Ref Range Status   03/04/2022 60.4 (L) 63.0 - 159.0 mg/dL Final     Comment:     The National Cholesterol Education Program (NCEP) has set the  following guidelines (reference values) for LDL Cholesterol:  Optimal.......................<130 mg/dL  Borderline High...............130-159 mg/dL  High..........................160-189 mg/dL  Very High.....................>190 mg/dL                Passed - HDL within 360  days     HDL   Date Value Ref Range Status   03/04/2022 60 40 - 75 mg/dL Final     Comment:     The National Cholesterol Education Program (NCEP) has set the  following guidelines (reference values) for HDL Cholesterol:  Low...............<40 mg/dL  Optimal...........>60 mg/dL                Passed - Triglycerides within 360 days       Lab Results   Component Value Date    TRIG 63 03/04/2022    TRIG 83 01/29/2021    TRIG 50 09/13/2019                     Appointments  past 12m or future 3m with PCP    Date Provider   Last Visit   11/24/2021 Vandana Smith MD   Next Visit   6/17/2022 Vandana Smith MD   ED visits in past 90 days: 0        Note composed:10:02 AM 03/31/2022

## 2022-03-31 NOTE — TELEPHONE ENCOUNTER
No new care gaps identified.  Powered by [employer]. Reference number: [id number].   3/31/2022 9:53:10 AM CDT

## 2022-04-18 ENCOUNTER — OFFICE VISIT (OUTPATIENT)
Dept: HEMATOLOGY/ONCOLOGY | Facility: CLINIC | Age: 82
End: 2022-04-18
Payer: MEDICARE

## 2022-04-18 VITALS
DIASTOLIC BLOOD PRESSURE: 73 MMHG | HEIGHT: 67 IN | BODY MASS INDEX: 28.36 KG/M2 | WEIGHT: 180.69 LBS | SYSTOLIC BLOOD PRESSURE: 146 MMHG | RESPIRATION RATE: 18 BRPM | HEART RATE: 73 BPM | TEMPERATURE: 98 F | OXYGEN SATURATION: 96 %

## 2022-04-18 DIAGNOSIS — C50.512 MALIGNANT NEOPLASM OF LOWER-OUTER QUADRANT OF LEFT BREAST OF FEMALE, ESTROGEN RECEPTOR POSITIVE: Primary | ICD-10-CM

## 2022-04-18 DIAGNOSIS — Z17.0 MALIGNANT NEOPLASM OF LOWER-OUTER QUADRANT OF LEFT BREAST OF FEMALE, ESTROGEN RECEPTOR POSITIVE: Primary | ICD-10-CM

## 2022-04-18 PROCEDURE — 3078F PR MOST RECENT DIASTOLIC BLOOD PRESSURE < 80 MM HG: ICD-10-PCS | Mod: CPTII,S$GLB,, | Performed by: INTERNAL MEDICINE

## 2022-04-18 PROCEDURE — 99214 PR OFFICE/OUTPT VISIT, EST, LEVL IV, 30-39 MIN: ICD-10-PCS | Mod: S$GLB,,, | Performed by: INTERNAL MEDICINE

## 2022-04-18 PROCEDURE — 3078F DIAST BP <80 MM HG: CPT | Mod: CPTII,S$GLB,, | Performed by: INTERNAL MEDICINE

## 2022-04-18 PROCEDURE — 1126F AMNT PAIN NOTED NONE PRSNT: CPT | Mod: CPTII,S$GLB,, | Performed by: INTERNAL MEDICINE

## 2022-04-18 PROCEDURE — 1159F PR MEDICATION LIST DOCUMENTED IN MEDICAL RECORD: ICD-10-PCS | Mod: CPTII,S$GLB,, | Performed by: INTERNAL MEDICINE

## 2022-04-18 PROCEDURE — 99214 OFFICE O/P EST MOD 30 MIN: CPT | Mod: S$GLB,,, | Performed by: INTERNAL MEDICINE

## 2022-04-18 PROCEDURE — 3077F PR MOST RECENT SYSTOLIC BLOOD PRESSURE >= 140 MM HG: ICD-10-PCS | Mod: CPTII,S$GLB,, | Performed by: INTERNAL MEDICINE

## 2022-04-18 PROCEDURE — 99999 PR PBB SHADOW E&M-EST. PATIENT-LVL III: ICD-10-PCS | Mod: PBBFAC,,, | Performed by: INTERNAL MEDICINE

## 2022-04-18 PROCEDURE — 3077F SYST BP >= 140 MM HG: CPT | Mod: CPTII,S$GLB,, | Performed by: INTERNAL MEDICINE

## 2022-04-18 PROCEDURE — 1126F PR PAIN SEVERITY QUANTIFIED, NO PAIN PRESENT: ICD-10-PCS | Mod: CPTII,S$GLB,, | Performed by: INTERNAL MEDICINE

## 2022-04-18 PROCEDURE — 99999 PR PBB SHADOW E&M-EST. PATIENT-LVL III: CPT | Mod: PBBFAC,,, | Performed by: INTERNAL MEDICINE

## 2022-04-18 PROCEDURE — 1159F MED LIST DOCD IN RCRD: CPT | Mod: CPTII,S$GLB,, | Performed by: INTERNAL MEDICINE

## 2022-04-18 NOTE — PROGRESS NOTES
[facility] Note:      Chief Complaint:   Encounter Diagnosis   Name Primary?    Malignant neoplasm of lower-outer quadrant of left breast of female, estrogen receptor positive Yes       Cancer Staging  Malignant neoplasm of lower-outer quadrant of left breast of female, estrogen receptor positive  Staging form: Breast, AJCC 8th Edition  - Clinical stage from 12/20/2021: cT2, cN0, cM0, G1, ER+, RI: Not Assessed, HER2: Not Assessed - Signed by Aliza Olvera MD on 1/6/2022      HPI:  Karis Briceño is a 81 y.o. female who presents today for follow up of breast cancer after starting on NAET. Patient has met with Dr Kruger. She is tolerating anastrazole well without any issues. Continues to teach. Overall doing well     Oncology History  Patient felt a mass in the left breast in October 2021  Diagnostic mammogram 10/25/21 demonstrated bilateral breast masses with recommendation for biopsy  Bilateral breast biopsy 12/20/21 showed a R breast IDC, grade 1, diffuse staining for estrogen and a L breast fibroadenoma   Patient's pathology was initially sent to [place], therefore no receptors were initially run    Patient has a history of HRT which she took for 10 years. She stopped these >10 years ago.         Patient Active Problem List   Diagnosis    Essential hypertension    Cholelithiasis    Refused influenza vaccine    Overweight (BMI 25.0-29.9)    Pre-diabetes    Mixed hyperlipidemia    Poor dentition    Chronic left shoulder pain    Decreased strength of upper extremity    Decreased functional mobility    Impaired range of motion of cervical spine    Malignant neoplasm of lower-outer quadrant of left breast of female, estrogen receptor positive       Current Outpatient Medications   Medication Instructions    amLODIPine (NORVASC) 10 mg, Oral, Daily    anastrozole (ARIMIDEX) 1 mg, Oral, Daily    aspirin (ECOTRIN) 81 mg, Oral, Daily    atorvastatin  "(LIPITOR) 40 mg, Oral, Daily    cyclobenzaprine (FLEXERIL) 5 mg, Oral, 3 times daily PRN    diclofenac sodium (VOLTAREN) 2 g, Topical (Top), 4 times daily PRN    fish oil-omega-3 fatty acids 300-1,000 mg capsule 1 capsule, Oral, Daily    hydroCHLOROthiazide (HYDRODIURIL) 25 mg, Oral, Daily    LIDOcaine (LIDODERM) 5 % 1 patch, Transdermal, Daily, Remove & Discard patch within 12 hours or as directed by MD Eli freemanrtan (DIOVAN) 320 mg, Oral, Daily       Review of Systems:   Review of Systems   Constitutional: Negative.    HENT: Negative.    Respiratory: Negative.    Cardiovascular: Negative.    Gastrointestinal: Negative.    Musculoskeletal: Negative.    Neurological: Negative.    Endo/Heme/Allergies: Negative.    All other systems reviewed and are negative.      PHYSICAL EXAM:  BP (!) 146/73   Pulse 73   Temp 97.8 °F (36.6 °C) (Oral)   Resp 18   Ht 5' 6.5" (1.689 m)   Wt 81.9 kg (180 lb 10.7 oz)   SpO2 96%   BMI 28.72 kg/m²     General Appearance:    Alert, cooperative, no distress, appears stated age   Lungs:     Clear to auscultation bilaterally, respirations unlabored    Heart:    Regular rate and rhythm, S1 and S2 normal   Breast Exam:    Bilateral breast normal. Unable to palpate L breast mass. No right masses, no tenderness, no skin or nipple abnormalities.  No lymphadenopathy.    Abdomen:     Soft, non-tender, bowel sounds active, no masses, no organomegaly   Extremities:   Extremities normal, atraumatic, no cyanosis or edema   Pulses:   2+ and symmetric all extremities   Skin:   Skin color, texture, turgor normal, no rashes or lesions   Lymph nodes:   Cervical, supraclavicular, and axillary nodes normal           Pertinent Labs & Imaging:  Diagnostic mammogram 10/25/21:   Impression:  Left  Mass: Left breast mass at the anterior 6 o'clock position. Assessment: 4 - Suspicious finding. Biopsy is recommended.   Right  Mass: Right breast 6 mm mass at the 10 o'clock position. Assessment: 4 - " Suspicious finding. Biopsy is recommended.   BI-RADS Category: Overall: 4 - Suspicious    Bilateral breast ultrasound 12/20/21  1. BREAST, LEFT, 06:00, BIOPSY:   -. Invasive ductal carcinoma, see [place] Consultative Report below.   - Size of invasive carcinoma:  3 MM in greatest linear dimension within a single core biopsy fragment.   - [place] Histologic Score: Grade 1 of 3.                     Tubule Formation:  2                     Nuclear Pleomorphism:  2                     Mitotic Activity:  1   - No definitive in-situ component identified.   - No lymphovascular invasion.   - Microcalcifications:  Not identified.   - Breast biomarkers will be performed upon return of materials from [facility].   2. BREAST, RIGHT, 10:00, BIOPSY:   - Benign breast tissue with stromal fibrosis and fibroadenomatoid nodules.   - Microcalcifications:  Not identified.   - Negative for atypia or malignancy.     [place] FINAL DIAGNOSIS:   Interpretation   Breast, core biopsy (12/20/2021):   1. Left breast, 6:00: Invasive ductal carcinoma with prominent cribriform growth pattern, Mclean grade I (of III).   The submitted immunostains for SMA, p63 and CK 5/6 show absence of myoepithelial cell layer in the tumor. The tumor is diffusely positive for ER. These findings support the above diagnosis.   2. Right breast, 10:00: Fibroadenomatoid nodule.          No results found for this or any previous visit (from the past 24 hour(s)).    Assessment & Plan:    1. Malignant neoplasm of lower-outer quadrant of left breast of female, estrogen receptor positive    Patient on NAET with anastrazole. Overall she is tolerating this well without major side effects.   She has follow up breast imaging on 5/19/22 and visit with Dr Kruger   If she chooses not to move forward with surgery, will plan for AI indefinitely. If she has surgery, will plan for 5 yrs of treatment depending on outcome of surgery.     Patient is agreeable to this plan. Follow up in  3 mos with repeat imaging      Med Onc Chart Routing      Follow up with physician 3 months.   Follow up with CHRISTINA    Labs None   Lab interval:     Imaging    L breast ultrasound    Pharmacy appointment    Other referrals            Total time of this visit, including time spent face to face with patient and/or via video/audio, and also in preparing for today's visit for MDM and documentation. (Medical Decision Making, including consideration of possible diagnoses, management options, complex medical record review, review of diagnostic tests and information, consideration and discussion of significant complications based on comorbidities, and discussion with providers involved with the care of the patient) 30 minutes. Greater than 50% was spent face to face with the patient counseling and coordinating care.      Aliza Olvera MD  04/18/2022

## 2022-06-23 ENCOUNTER — OFFICE VISIT (OUTPATIENT)
Dept: SURGERY | Facility: CLINIC | Age: 82
End: 2022-06-23
Payer: MEDICARE

## 2022-06-23 ENCOUNTER — HOSPITAL ENCOUNTER (OUTPATIENT)
Dept: RADIOLOGY | Facility: HOSPITAL | Age: 82
Discharge: HOME | End: 2022-06-23
Attending: SURGERY
Payer: MEDICARE

## 2022-06-23 VITALS
WEIGHT: 183.31 LBS | SYSTOLIC BLOOD PRESSURE: 144 MMHG | BODY MASS INDEX: 29.46 KG/M2 | HEIGHT: 66 IN | OXYGEN SATURATION: 97 % | DIASTOLIC BLOOD PRESSURE: 70 MMHG | HEART RATE: 71 BPM

## 2022-06-23 VITALS — BODY MASS INDEX: 28.93 KG/M2 | HEIGHT: 66 IN | WEIGHT: 180 LBS

## 2022-06-23 DIAGNOSIS — C50.112 MALIGNANT NEOPLASM OF CENTRAL PORTION OF LEFT BREAST IN FEMALE, ESTROGEN RECEPTOR POSITIVE: Primary | ICD-10-CM

## 2022-06-23 DIAGNOSIS — C50.112 MALIGNANT NEOPLASM OF CENTRAL PORTION OF LEFT BREAST IN FEMALE, ESTROGEN RECEPTOR POSITIVE: ICD-10-CM

## 2022-06-23 DIAGNOSIS — Z17.0 MALIGNANT NEOPLASM OF CENTRAL PORTION OF LEFT BREAST IN FEMALE, ESTROGEN RECEPTOR POSITIVE: ICD-10-CM

## 2022-06-23 DIAGNOSIS — Z17.0 MALIGNANT NEOPLASM OF CENTRAL PORTION OF LEFT BREAST IN FEMALE, ESTROGEN RECEPTOR POSITIVE: Primary | ICD-10-CM

## 2022-06-23 PROCEDURE — 1159F PR MEDICATION LIST DOCUMENTED IN MEDICAL RECORD: ICD-10-PCS | Mod: CPTII,S$GLB,, | Performed by: SURGERY

## 2022-06-23 PROCEDURE — 3288F FALL RISK ASSESSMENT DOCD: CPT | Mod: CPTII,S$GLB,, | Performed by: SURGERY

## 2022-06-23 PROCEDURE — 77062 BREAST TOMOSYNTHESIS BI: CPT | Mod: TC

## 2022-06-23 PROCEDURE — 1160F RVW MEDS BY RX/DR IN RCRD: CPT | Mod: CPTII,S$GLB,, | Performed by: SURGERY

## 2022-06-23 PROCEDURE — 99211 PR OFFICE/OUTPT VISIT, EST, LEVL I: ICD-10-PCS | Mod: S$GLB,,, | Performed by: SURGERY

## 2022-06-23 PROCEDURE — 3077F SYST BP >= 140 MM HG: CPT | Mod: CPTII,S$GLB,, | Performed by: SURGERY

## 2022-06-23 PROCEDURE — 1101F PR PT FALLS ASSESS DOC 0-1 FALLS W/OUT INJ PAST YR: ICD-10-PCS | Mod: CPTII,S$GLB,, | Performed by: SURGERY

## 2022-06-23 PROCEDURE — 99999 PR PBB SHADOW E&M-EST. PATIENT-LVL III: ICD-10-PCS | Mod: PBBFAC,,, | Performed by: SURGERY

## 2022-06-23 PROCEDURE — 3288F PR FALLS RISK ASSESSMENT DOCUMENTED: ICD-10-PCS | Mod: CPTII,S$GLB,, | Performed by: SURGERY

## 2022-06-23 PROCEDURE — 1159F MED LIST DOCD IN RCRD: CPT | Mod: CPTII,S$GLB,, | Performed by: SURGERY

## 2022-06-23 PROCEDURE — 77066 MAMMO DIGITAL DIAGNOSTIC BILAT WITH TOMO: ICD-10-PCS | Mod: 26,,, | Performed by: RADIOLOGY

## 2022-06-23 PROCEDURE — 77062 MAMMO DIGITAL DIAGNOSTIC BILAT WITH TOMO: ICD-10-PCS | Mod: 26,,, | Performed by: RADIOLOGY

## 2022-06-23 PROCEDURE — 1126F AMNT PAIN NOTED NONE PRSNT: CPT | Mod: CPTII,S$GLB,, | Performed by: SURGERY

## 2022-06-23 PROCEDURE — 77062 BREAST TOMOSYNTHESIS BI: CPT | Mod: 26,,, | Performed by: RADIOLOGY

## 2022-06-23 PROCEDURE — 99999 PR PBB SHADOW E&M-EST. PATIENT-LVL III: CPT | Mod: PBBFAC,,, | Performed by: SURGERY

## 2022-06-23 PROCEDURE — 1160F PR REVIEW ALL MEDS BY PRESCRIBER/CLIN PHARMACIST DOCUMENTED: ICD-10-PCS | Mod: CPTII,S$GLB,, | Performed by: SURGERY

## 2022-06-23 PROCEDURE — 99211 OFF/OP EST MAY X REQ PHY/QHP: CPT | Mod: S$GLB,,, | Performed by: SURGERY

## 2022-06-23 PROCEDURE — 1126F PR PAIN SEVERITY QUANTIFIED, NO PAIN PRESENT: ICD-10-PCS | Mod: CPTII,S$GLB,, | Performed by: SURGERY

## 2022-06-23 PROCEDURE — 3077F PR MOST RECENT SYSTOLIC BLOOD PRESSURE >= 140 MM HG: ICD-10-PCS | Mod: CPTII,S$GLB,, | Performed by: SURGERY

## 2022-06-23 PROCEDURE — 1101F PT FALLS ASSESS-DOCD LE1/YR: CPT | Mod: CPTII,S$GLB,, | Performed by: SURGERY

## 2022-06-23 PROCEDURE — 3078F PR MOST RECENT DIASTOLIC BLOOD PRESSURE < 80 MM HG: ICD-10-PCS | Mod: CPTII,S$GLB,, | Performed by: SURGERY

## 2022-06-23 PROCEDURE — 77066 DX MAMMO INCL CAD BI: CPT | Mod: 26,,, | Performed by: RADIOLOGY

## 2022-06-23 PROCEDURE — 3078F DIAST BP <80 MM HG: CPT | Mod: CPTII,S$GLB,, | Performed by: SURGERY

## 2022-06-23 NOTE — PROGRESS NOTES
81 year old patient here for follow up of an ER + left breast cancer on an AI (treating non-op)  Imaging today shows a partial response  No palpable findings  Good response to AI therapy  Will have Dr Olvera do her surveillance  I will see as needed  Should see Dr Olvera in 6 months with imaging

## 2022-07-11 ENCOUNTER — TELEPHONE (OUTPATIENT)
Dept: HEMATOLOGY/ONCOLOGY | Facility: CLINIC | Age: 82
End: 2022-07-11
Payer: MEDICARE

## 2022-07-11 NOTE — TELEPHONE ENCOUNTER
"Returned call, questions answered, follow up appt made.           ----- Message from Danny Tellez sent at 7/11/2022  2:05 PM CDT -----  Consult/Advisory:          Name Of Caller: Self      Contact Preference?: [phone]       Provider Name: May      Does patient feel the need to be seen today? No      What is the nature of the call?: Calling to speak w/ nurse about getting her shingles shot while currently taking chemo pills. Also inquiring about getting 2nd booster shot        Additional Notes:  "Thank you for all that you do for our patients"        "

## 2022-07-14 ENCOUNTER — OFFICE VISIT (OUTPATIENT)
Dept: HEMATOLOGY/ONCOLOGY | Facility: CLINIC | Age: 82
End: 2022-07-14
Payer: MEDICARE

## 2022-07-14 VITALS
OXYGEN SATURATION: 97 % | RESPIRATION RATE: 17 BRPM | HEART RATE: 75 BPM | HEIGHT: 66 IN | DIASTOLIC BLOOD PRESSURE: 74 MMHG | WEIGHT: 181 LBS | BODY MASS INDEX: 29.09 KG/M2 | SYSTOLIC BLOOD PRESSURE: 153 MMHG

## 2022-07-14 DIAGNOSIS — Z17.0 MALIGNANT NEOPLASM OF LOWER-OUTER QUADRANT OF LEFT BREAST OF FEMALE, ESTROGEN RECEPTOR POSITIVE: Primary | ICD-10-CM

## 2022-07-14 DIAGNOSIS — C50.512 MALIGNANT NEOPLASM OF LOWER-OUTER QUADRANT OF LEFT BREAST OF FEMALE, ESTROGEN RECEPTOR POSITIVE: Primary | ICD-10-CM

## 2022-07-14 PROCEDURE — 1159F MED LIST DOCD IN RCRD: CPT | Mod: CPTII,S$GLB,, | Performed by: INTERNAL MEDICINE

## 2022-07-14 PROCEDURE — 3078F DIAST BP <80 MM HG: CPT | Mod: CPTII,S$GLB,, | Performed by: INTERNAL MEDICINE

## 2022-07-14 PROCEDURE — 99999 PR PBB SHADOW E&M-EST. PATIENT-LVL III: CPT | Mod: PBBFAC,,, | Performed by: INTERNAL MEDICINE

## 2022-07-14 PROCEDURE — 1160F PR REVIEW ALL MEDS BY PRESCRIBER/CLIN PHARMACIST DOCUMENTED: ICD-10-PCS | Mod: CPTII,S$GLB,, | Performed by: INTERNAL MEDICINE

## 2022-07-14 PROCEDURE — 1126F AMNT PAIN NOTED NONE PRSNT: CPT | Mod: CPTII,S$GLB,, | Performed by: INTERNAL MEDICINE

## 2022-07-14 PROCEDURE — 3077F PR MOST RECENT SYSTOLIC BLOOD PRESSURE >= 140 MM HG: ICD-10-PCS | Mod: CPTII,S$GLB,, | Performed by: INTERNAL MEDICINE

## 2022-07-14 PROCEDURE — 3078F PR MOST RECENT DIASTOLIC BLOOD PRESSURE < 80 MM HG: ICD-10-PCS | Mod: CPTII,S$GLB,, | Performed by: INTERNAL MEDICINE

## 2022-07-14 PROCEDURE — 1159F PR MEDICATION LIST DOCUMENTED IN MEDICAL RECORD: ICD-10-PCS | Mod: CPTII,S$GLB,, | Performed by: INTERNAL MEDICINE

## 2022-07-14 PROCEDURE — 3077F SYST BP >= 140 MM HG: CPT | Mod: CPTII,S$GLB,, | Performed by: INTERNAL MEDICINE

## 2022-07-14 PROCEDURE — 1126F PR PAIN SEVERITY QUANTIFIED, NO PAIN PRESENT: ICD-10-PCS | Mod: CPTII,S$GLB,, | Performed by: INTERNAL MEDICINE

## 2022-07-14 PROCEDURE — 99999 PR PBB SHADOW E&M-EST. PATIENT-LVL III: ICD-10-PCS | Mod: PBBFAC,,, | Performed by: INTERNAL MEDICINE

## 2022-07-14 PROCEDURE — 1160F RVW MEDS BY RX/DR IN RCRD: CPT | Mod: CPTII,S$GLB,, | Performed by: INTERNAL MEDICINE

## 2022-07-14 PROCEDURE — 99214 PR OFFICE/OUTPT VISIT, EST, LEVL IV, 30-39 MIN: ICD-10-PCS | Mod: S$GLB,,, | Performed by: INTERNAL MEDICINE

## 2022-07-14 PROCEDURE — 99214 OFFICE O/P EST MOD 30 MIN: CPT | Mod: S$GLB,,, | Performed by: INTERNAL MEDICINE

## 2022-08-05 ENCOUNTER — TELEPHONE (OUTPATIENT)
Dept: HEMATOLOGY/ONCOLOGY | Facility: CLINIC | Age: 82
End: 2022-08-05
Payer: MEDICARE

## 2022-08-05 NOTE — TELEPHONE ENCOUNTER
Returned call, spoke with pt. Confirmed per clinic note, pt to take Vit D and calcium.  Pt verbalized understanding.            ----- Message from Darwin Escobar sent at 8/5/2022  3:24 PM CDT -----  Regarding: PT'S REQUESTING A CALL BACK REGARDING IF SHE IS SUPPOSED TO BE TAKING VIT D OR C  Contact: pt  Pt's requesting a call back from staff..       Confirmed contact info below:  Contact Name: Karis Briceño  Phone Number: [phone]

## 2022-08-30 ENCOUNTER — TELEPHONE (OUTPATIENT)
Dept: HEMATOLOGY/ONCOLOGY | Facility: CLINIC | Age: 82
End: 2022-08-30
Payer: MEDICARE

## 2022-09-27 DIAGNOSIS — E78.2 MIXED HYPERLIPIDEMIA: ICD-10-CM

## 2022-09-27 RX ORDER — ATORVASTATIN CALCIUM 40 MG/1
40 TABLET, FILM COATED ORAL DAILY
Qty: 90 TABLET | Refills: 0 | Status: SHIPPED | OUTPATIENT
Start: 2022-09-27 | End: 2022-12-28 | Stop reason: SDUPTHER

## 2022-09-27 NOTE — TELEPHONE ENCOUNTER
Care Due:                  Date            Visit Type   Department     Provider  --------------------------------------------------------------------------------                                EP -                              PRIMARY      [facility] INTERNAL  Last Visit: 11-      CARE ([facility])   MEDICINE       Vandana Smith  Next Visit: None Scheduled  None         None Found                                                            Last  Test          Frequency    Reason                     Performed    Due Date  --------------------------------------------------------------------------------    Office Visit  12 months..  amLODIPine, atorvastatin,   11- 11-                             hydroCHLOROthiazide,                             valsartan................    CMP.........  12 months..  atorvastatin,              10-   10-                             hydroCHLOROthiazide,                             valsartan................    [employer] Embedded Care Gaps. Reference number: [id number]. 9/27/2022   12:11:41 PM CDT

## 2022-09-27 NOTE — TELEPHONE ENCOUNTER
Refill Decision Note   Karis Briceño  is requesting a refill authorization.  Brief Assessment and Rationale for Refill:  Approve    -Medication-Related Problems Identified:   Requires labs  Requires appointment  Medication Therapy Plan:       Medication Reconciliation Completed: No   Comments:     Provider Staff:     Action is required for this patient.   Please see care gap opportunities below in Care Due Message.     Thanks!  [facility]     Appointments      Date Provider   Last Visit   11/24/2021 Vandana Smith MD   Next Visit   Visit date not found Vandana Smith MD     Note composed:12:19 PM 09/27/2022           Note composed:12:19 PM 09/27/2022

## 2022-10-03 ENCOUNTER — TELEPHONE (OUTPATIENT)
Dept: INTERNAL MEDICINE | Facility: CLINIC | Age: 82
End: 2022-10-03
Payer: MEDICARE

## 2022-10-03 NOTE — TELEPHONE ENCOUNTER
----- Message from Liane Howells sent at 10/3/2022  3:38 PM CDT -----  .Type:  Sooner Appointment Request    Patient is requesting a sooner appointment.  Patient declined first available appointment listed as well as another facility and provider .  Patient will not accept being placed on the waitlist and is requesting a message be sent to doctor.    Name of Caller:  self     When is the first available appointment? December     Symptoms: recall     Would the patient rather a call back or a response via [facility]?  Call     Best Call Back Number:  [phone]      Additional Information:  requesting oct 11 due to off work that day - nothing is in epic for any provider in [facility] IM

## 2022-10-03 NOTE — TELEPHONE ENCOUNTER
----- Message from Liane Colmenares sent at 10/3/2022  4:30 PM CDT -----  .Type:  Patient Returning Call    Who Called:  self     Who Left Message for Patient: Pee     Does the patient know what this is regarding?: yes     Would the patient rather a call back or a response via [facility]? Call     Best Call Back Number: [phone]

## 2022-10-03 NOTE — TELEPHONE ENCOUNTER
Patient was scheduled for annual appointment with Lili Brothers PA-C on 11/8/2022 per request. Thanks.

## 2022-10-10 ENCOUNTER — TELEPHONE (OUTPATIENT)
Dept: HEMATOLOGY/ONCOLOGY | Facility: CLINIC | Age: 82
End: 2022-10-10
Payer: MEDICARE

## 2022-10-10 ENCOUNTER — HOSPITAL ENCOUNTER (OUTPATIENT)
Dept: RADIOLOGY | Facility: OTHER | Age: 82
Discharge: HOME | End: 2022-10-10
Attending: SURGERY
Payer: MEDICARE

## 2022-10-10 DIAGNOSIS — C50.112 MALIGNANT NEOPLASM OF CENTRAL PORTION OF LEFT BREAST IN FEMALE, ESTROGEN RECEPTOR POSITIVE: ICD-10-CM

## 2022-10-10 DIAGNOSIS — Z17.0 MALIGNANT NEOPLASM OF CENTRAL PORTION OF LEFT BREAST IN FEMALE, ESTROGEN RECEPTOR POSITIVE: ICD-10-CM

## 2022-10-10 PROCEDURE — 76642 ULTRASOUND BREAST LIMITED: CPT | Mod: 26,LT,, | Performed by: RADIOLOGY

## 2022-10-10 PROCEDURE — 76642 US BREAST LEFT LIMITED: ICD-10-PCS | Mod: 26,LT,, | Performed by: RADIOLOGY

## 2022-10-10 PROCEDURE — 76642 ULTRASOUND BREAST LIMITED: CPT | Mod: TC,LT

## 2022-10-10 NOTE — TELEPHONE ENCOUNTER
"----- Message from Candace Garza RN sent at 10/10/2022  3:48 PM CDT -----  Regarding: FW: speak with office  Contact: suzanna    ----- Message -----  From: Cindi Solis  Sent: 10/10/2022   3:46 PM CDT  To: May Abrams Staff  Subject: speak with office                                Consult/Advisory:           Name Of Caller: lio       Contact Preference?:[phone] (home) leave message if no answer.             Does patient feel the need to be seen today? no           What is the nature of the call?: speak with office about appt date            Additional Notes:  "Thank you for all that you do for our patients'"       "

## 2022-11-03 ENCOUNTER — PATIENT OUTREACH (OUTPATIENT)
Dept: ADMINISTRATIVE | Facility: HOSPITAL | Age: 82
End: 2022-11-03
Payer: MEDICARE

## 2022-11-03 ENCOUNTER — PATIENT MESSAGE (OUTPATIENT)
Dept: ADMINISTRATIVE | Facility: HOSPITAL | Age: 82
End: 2022-11-03
Payer: MEDICARE

## 2022-11-03 DIAGNOSIS — Z78.0 POSTMENOPAUSAL: Primary | ICD-10-CM

## 2022-11-08 ENCOUNTER — OFFICE VISIT (OUTPATIENT)
Dept: INTERNAL MEDICINE | Facility: CLINIC | Age: 82
End: 2022-11-08
Payer: MEDICARE

## 2022-11-08 ENCOUNTER — LAB VISIT (OUTPATIENT)
Dept: LAB | Facility: OTHER | Age: 82
End: 2022-11-08
Attending: PHYSICIAN ASSISTANT
Payer: MEDICARE

## 2022-11-08 VITALS
HEIGHT: 66 IN | HEART RATE: 87 BPM | BODY MASS INDEX: 28.77 KG/M2 | SYSTOLIC BLOOD PRESSURE: 122 MMHG | WEIGHT: 179 LBS | DIASTOLIC BLOOD PRESSURE: 62 MMHG | OXYGEN SATURATION: 96 %

## 2022-11-08 DIAGNOSIS — E78.2 MIXED HYPERLIPIDEMIA: ICD-10-CM

## 2022-11-08 DIAGNOSIS — Z00.00 ANNUAL PHYSICAL EXAM: Primary | ICD-10-CM

## 2022-11-08 DIAGNOSIS — Z17.0 MALIGNANT NEOPLASM OF LOWER-OUTER QUADRANT OF LEFT BREAST OF FEMALE, ESTROGEN RECEPTOR POSITIVE: ICD-10-CM

## 2022-11-08 DIAGNOSIS — C50.512 MALIGNANT NEOPLASM OF LOWER-OUTER QUADRANT OF LEFT BREAST OF FEMALE, ESTROGEN RECEPTOR POSITIVE: ICD-10-CM

## 2022-11-08 DIAGNOSIS — R73.03 PRE-DIABETES: ICD-10-CM

## 2022-11-08 DIAGNOSIS — I10 ESSENTIAL HYPERTENSION: ICD-10-CM

## 2022-11-08 LAB
ESTIMATED AVG GLUCOSE: 131 MG/DL (ref 68–131)
HBA1C MFR BLD: 6.2 % (ref 4–5.6)

## 2022-11-08 PROCEDURE — 3288F PR FALLS RISK ASSESSMENT DOCUMENTED: ICD-10-PCS | Mod: CPTII,S$GLB,, | Performed by: PHYSICIAN ASSISTANT

## 2022-11-08 PROCEDURE — 99214 PR OFFICE/OUTPT VISIT, EST, LEVL IV, 30-39 MIN: ICD-10-PCS | Mod: S$GLB,,, | Performed by: PHYSICIAN ASSISTANT

## 2022-11-08 PROCEDURE — 1159F PR MEDICATION LIST DOCUMENTED IN MEDICAL RECORD: ICD-10-PCS | Mod: CPTII,S$GLB,, | Performed by: PHYSICIAN ASSISTANT

## 2022-11-08 PROCEDURE — 83036 HEMOGLOBIN GLYCOSYLATED A1C: CPT | Performed by: PHYSICIAN ASSISTANT

## 2022-11-08 PROCEDURE — 1101F PT FALLS ASSESS-DOCD LE1/YR: CPT | Mod: CPTII,S$GLB,, | Performed by: PHYSICIAN ASSISTANT

## 2022-11-08 PROCEDURE — 3078F DIAST BP <80 MM HG: CPT | Mod: CPTII,S$GLB,, | Performed by: PHYSICIAN ASSISTANT

## 2022-11-08 PROCEDURE — 99214 OFFICE O/P EST MOD 30 MIN: CPT | Mod: S$GLB,,, | Performed by: PHYSICIAN ASSISTANT

## 2022-11-08 PROCEDURE — 3074F PR MOST RECENT SYSTOLIC BLOOD PRESSURE < 130 MM HG: ICD-10-PCS | Mod: CPTII,S$GLB,, | Performed by: PHYSICIAN ASSISTANT

## 2022-11-08 PROCEDURE — 3078F PR MOST RECENT DIASTOLIC BLOOD PRESSURE < 80 MM HG: ICD-10-PCS | Mod: CPTII,S$GLB,, | Performed by: PHYSICIAN ASSISTANT

## 2022-11-08 PROCEDURE — 3288F FALL RISK ASSESSMENT DOCD: CPT | Mod: CPTII,S$GLB,, | Performed by: PHYSICIAN ASSISTANT

## 2022-11-08 PROCEDURE — 3074F SYST BP LT 130 MM HG: CPT | Mod: CPTII,S$GLB,, | Performed by: PHYSICIAN ASSISTANT

## 2022-11-08 PROCEDURE — 1159F MED LIST DOCD IN RCRD: CPT | Mod: CPTII,S$GLB,, | Performed by: PHYSICIAN ASSISTANT

## 2022-11-08 PROCEDURE — 1101F PR PT FALLS ASSESS DOC 0-1 FALLS W/OUT INJ PAST YR: ICD-10-PCS | Mod: CPTII,S$GLB,, | Performed by: PHYSICIAN ASSISTANT

## 2022-11-08 PROCEDURE — 99999 PR PBB SHADOW E&M-EST. PATIENT-LVL IV: ICD-10-PCS | Mod: PBBFAC,,, | Performed by: PHYSICIAN ASSISTANT

## 2022-11-08 PROCEDURE — 1160F RVW MEDS BY RX/DR IN RCRD: CPT | Mod: CPTII,S$GLB,, | Performed by: PHYSICIAN ASSISTANT

## 2022-11-08 PROCEDURE — 36415 COLL VENOUS BLD VENIPUNCTURE: CPT | Performed by: PHYSICIAN ASSISTANT

## 2022-11-08 PROCEDURE — 1160F PR REVIEW ALL MEDS BY PRESCRIBER/CLIN PHARMACIST DOCUMENTED: ICD-10-PCS | Mod: CPTII,S$GLB,, | Performed by: PHYSICIAN ASSISTANT

## 2022-11-08 PROCEDURE — 99999 PR PBB SHADOW E&M-EST. PATIENT-LVL IV: CPT | Mod: PBBFAC,,, | Performed by: PHYSICIAN ASSISTANT

## 2022-11-08 NOTE — PROGRESS NOTES
"Internal Medicine Annual Exam       CHIEF COMPLAINT     The patient, Karis Briceño, who is a 82 y.o. female presents for an annual exam.    HPI     PCP is Vandana Smith MD, patient is new to me.     Her last annual with her PCP was 12/18/2020 - she has been seen since this but for urgent issues and routine follow-ups. She last had annual screening labs done 3/2022    Pre-diabetes  A1C borderline high, stable. Discussed portions. Stop fruit juices. Advised to read nutrition labels.  Consider starting metformin or GLP 1 with repeat A1c in 3 months.     Mixed hyperlipidemia  FLP controlled. Elevated ASCVD.  Continue Lipitor 40, aspirin 81. Discussed diet. Check FLP annually.     CBC WNL.       Today in clinic she is feeling well -has no complaints.     She has been vaccinated for COVID. Not interested in the flu vaccine.   She has an updated  DEXA scan scheduled.   She is not interested in medications for DM (metformin) -she admits that she is doing well with her oral BRCA treatment and has not been watching her diet closely "I'm 80 years old with breast cancer, I'm going to eat what I want"       HTN:    History of med noncompliance and was taking garlic instead.  Stress test negative for ischemia 9/2019.       Pt's BP is  controlled.  Compliant with Norvasc 10, HCTZ 25, valsartan 160.  Pt denies CP, SOB, lightheadedness, dizziness.  No leg cramps, edema. Not checking Bp at home regularly.      No snoring, apnea, daytime fatigue, falling asleep during inappropriate times.     ETOH:  2-3 beers/day after work.     Exercise: Walks.  Wants to start going to the gym, but limited d/t work schedule, COVID. Used to do marathons, CCC, but limited due to sciatica.  Enjoys dancing.  Walks. Wants to get a bike.  Tries to stay active.     Diet:  Eats kale, veggies, fruits.       HLD:    Abdominal ultrasound ordered for cholelithiasis suggesting hepatic steatosis. Compliant with Lipitor 40, aspirin 81.  Taking Omega 3 fish oil.  "     Overweight:     Is frustrated that her weight remains constant.  Gained nearly 10 lbs since 7/2020. Diet, exercise as above.    Wt Readings from Last 5 Encounters:   11/08/22 81.2 kg (179 lb 0.2 oz)   07/14/22 82.1 kg (181 lb)   06/23/22 83.2 kg (183 lb 5 oz)   06/23/22 81.6 kg (180 lb)   04/18/22 81.9 kg (180 lb 10.7 oz)      Decreased GFR:       Not Discussed today:    Pneumonia:  Presented to ER 7/20/2020 for fatigue and decreased appetite. Covid test negative but cxr showed bilateral infiltrates rasing c/f for possible false negative covid result. BNP was wnl, trop normal, tsh wnl. Completed azithro.  Planning on repeat CXR for clearance.        Past Medical History:  Past Medical History:   Diagnosis Date    Gallstone pancreatitis 09/2016    Gallstones     Hyperlipidemia     Hypertension     Malignant neoplasm of lower-outer quadrant of left breast of female, estrogen receptor positive 1/6/2022       Past Surgical History:   Procedure Laterality Date    HYSTERECTOMY          Family History   Problem Relation Age of Onset    Heart attack Father     Heart disease Sister     No Known Problems Mother     Skin cancer Neg Hx         Social History     Socioeconomic History    Marital status: Single   Tobacco Use    Smoking status: Never    Smokeless tobacco: Never   Substance and Sexual Activity    Alcohol use: Yes     Comment: socially    Drug use: No        Social History     Tobacco Use   Smoking Status Never   Smokeless Tobacco Never        Allergies as of 11/08/2022    (No Known Allergies)          Home Medications:  Prior to Admission medications    Medication Sig Start Date End Date Taking? Authorizing Provider   amLODIPine (NORVASC) 10 MG tablet Take 1 tablet (10 mg total) by mouth once daily. 3/31/22  Yes Vandana Smith MD   anastrozole (ARIMIDEX) 1 mg Tab Take 1 tablet (1 mg total) by mouth once daily. 1/6/22 1/6/23 Yes Aliza Olvera MD   atorvastatin (LIPITOR) 40 MG tablet Take 1 tablet (40 mg  "total) by mouth once daily. 9/27/22  Yes Vandana Smith MD   cyclobenzaprine (FLEXERIL) 5 MG tablet Take 1 tablet (5 mg total) by mouth 3 (three) times daily as needed for Muscle spasms. 12/18/20  Yes Vandana Smith MD   fish oil-omega-3 fatty acids 300-1,000 mg capsule Take 1 capsule by mouth once daily.   Yes Historical Provider   hydroCHLOROthiazide (HYDRODIURIL) 25 MG tablet Take 1 tablet (25 mg total) by mouth once daily. 3/31/22  Yes Vandana Smith MD   valsartan (DIOVAN) 320 MG tablet Take 1 tablet (320 mg total) by mouth once daily. 3/31/22  Yes Vandana Smith MD   aspirin (ECOTRIN) 81 MG EC tablet Take 1 tablet (81 mg total) by mouth once daily.  Patient not taking: Reported on 6/23/2022 3/23/20 10/11/21  Vandana Smith MD   diclofenac sodium (VOLTAREN) 1 % Gel Apply 2 g topically 4 (four) times daily as needed.  Patient not taking: Reported on 6/23/2022 12/18/20   Vandana Smith MD   LIDOcaine (LIDODERM) 5 % Place 1 patch onto the skin once daily. Remove & Discard patch within 12 hours or as directed by MD  Patient not taking: Reported on 6/23/2022 6/21/21   Jesus Perales MD       Review of Systems:  Review of Systems    Health Maintainence:   Immunizations:  Health Maintenance         Date Due Completion Date    Shingles Vaccine (1 of 2) Never done ---    Influenza Vaccine (1) 09/01/2022 12/1/2020 (Done)    Override on 12/1/2020: Done    DEXA Scan 11/26/2022 11/26/2019    TETANUS VACCINE 01/01/2025 1/1/2015 (Done)    Override on 1/1/2015: Done    Lipid Panel 03/04/2027 3/4/2022             PHYSICAL EXAM     BP (!) 151/69 (BP Location: Left arm, Patient Position: Sitting)   Pulse 87   Ht 5' 6" (1.676 m)   Wt 81.2 kg (179 lb 0.2 oz)   SpO2 96%   BMI 28.89 kg/m²  Body mass index is 28.89 kg/m².    Physical Exam    LABS     Lab Results   Component Value Date    HGBA1C 6.1 (H) 03/04/2022     CMP  Sodium   Date Value Ref Range Status   10/11/2021 142 136 - 145 mmol/L Final     Potassium   Date Value " Ref Range Status   10/11/2021 3.8 3.5 - 5.1 mmol/L Final     Chloride   Date Value Ref Range Status   10/11/2021 104 95 - 110 mmol/L Final     CO2   Date Value Ref Range Status   10/11/2021 28 23 - 29 mmol/L Final     Glucose   Date Value Ref Range Status   10/11/2021 124 (H) 70 - 110 mg/dL Final     BUN   Date Value Ref Range Status   10/11/2021 23 8 - 23 mg/dL Final     Creatinine   Date Value Ref Range Status   10/11/2021 0.8 0.5 - 1.4 mg/dL Final     Calcium   Date Value Ref Range Status   10/11/2021 9.8 8.7 - 10.5 mg/dL Final     Total Protein   Date Value Ref Range Status   10/11/2021 7.6 6.0 - 8.4 g/dL Final     Albumin   Date Value Ref Range Status   10/11/2021 4.0 3.5 - 5.2 g/dL Final     Total Bilirubin   Date Value Ref Range Status   10/11/2021 1.0 0.1 - 1.0 mg/dL Final     Comment:     For infants and newborns, interpretation of results should be based  on gestational age, weight and in agreement with clinical  observations.    Premature Infant recommended reference ranges:  Up to 24 hours.............<8.0 mg/dL  Up to 48 hours............<12.0 mg/dL  3-5 days..................<15.0 mg/dL  6-29 days.................<15.0 mg/dL       Alkaline Phosphatase   Date Value Ref Range Status   10/11/2021 58 55 - 135 U/L Final     AST   Date Value Ref Range Status   10/11/2021 28 10 - 40 U/L Final     ALT   Date Value Ref Range Status   10/11/2021 24 10 - 44 U/L Final     Anion Gap   Date Value Ref Range Status   10/11/2021 10 8 - 16 mmol/L Final     eGFR if    Date Value Ref Range Status   10/11/2021 >60 >60 mL/min/1.73 m^2 Final     eGFR if non    Date Value Ref Range Status   10/11/2021 >60 >60 mL/min/1.73 m^2 Final     Comment:     Calculation used to obtain the estimated glomerular filtration  rate (eGFR) is the CKD-EPI equation.        Lab Results   Component Value Date    WBC 4.93 03/04/2022    HGB 13.5 03/04/2022    HCT 41.3 03/04/2022    MCV 94 03/04/2022      03/04/2022     Lab Results   Component Value Date    CHOL 133 03/04/2022    CHOL 149 01/29/2021    CHOL 220 (H) 09/13/2019     Lab Results   Component Value Date    HDL 60 03/04/2022    HDL 71 01/29/2021    HDL 60 09/13/2019     Lab Results   Component Value Date    LDLCALC 60.4 (L) 03/04/2022    LDLCALC 61.4 (L) 01/29/2021    LDLCALC 150.0 09/13/2019     Lab Results   Component Value Date    TRIG 63 03/04/2022    TRIG 83 01/29/2021    TRIG 50 09/13/2019     Lab Results   Component Value Date    CHOLHDL 45.1 03/04/2022    CHOLHDL 47.7 01/29/2021    CHOLHDL 27.3 09/13/2019     Lab Results   Component Value Date    TSH 1.259 07/20/2020       ASSESSMENT/PLAN     Karis Briceño is a 82 y.o. female    Karis was seen today for annual exam and hypertension. She is feeling well and has no complaints today. Will check A1C and will encourage healthy diet, graded exercise. HM is up to date apart from vaccines that she would rather not take. She will RTC in 6 months with PCP, sooner if needed.     Diagnoses and all orders for this visit:    Annual physical exam    Pre-diabetes  -     Hemoglobin A1C; Future    Essential hypertension   -continue current regimen of amlodipine 10, HCTZ 25, Valsartan 320.    Mixed hyperlipidemia   -continue lipitor 40    Malignant neoplasm of lower-outer quadrant of left breast of female, estrogen receptor positive   - follows with Dr. May Brothers PA-C  Department of Internal Medicine - [facility]   9:34 AM

## 2022-11-21 ENCOUNTER — OFFICE VISIT (OUTPATIENT)
Dept: HEMATOLOGY/ONCOLOGY | Facility: CLINIC | Age: 82
End: 2022-11-21
Payer: MEDICARE

## 2022-11-21 VITALS
DIASTOLIC BLOOD PRESSURE: 71 MMHG | TEMPERATURE: 98 F | OXYGEN SATURATION: 96 % | BODY MASS INDEX: 28.88 KG/M2 | RESPIRATION RATE: 16 BRPM | HEIGHT: 66 IN | HEART RATE: 81 BPM | SYSTOLIC BLOOD PRESSURE: 134 MMHG | WEIGHT: 179.69 LBS

## 2022-11-21 DIAGNOSIS — Z17.0 MALIGNANT NEOPLASM OF LOWER-OUTER QUADRANT OF LEFT BREAST OF FEMALE, ESTROGEN RECEPTOR POSITIVE: Primary | ICD-10-CM

## 2022-11-21 DIAGNOSIS — Z79.811 AROMATASE INHIBITOR USE: ICD-10-CM

## 2022-11-21 DIAGNOSIS — C50.512 MALIGNANT NEOPLASM OF LOWER-OUTER QUADRANT OF LEFT BREAST OF FEMALE, ESTROGEN RECEPTOR POSITIVE: Primary | ICD-10-CM

## 2022-11-21 PROCEDURE — 99999 PR PBB SHADOW E&M-EST. PATIENT-LVL III: CPT | Mod: PBBFAC,,, | Performed by: INTERNAL MEDICINE

## 2022-11-21 PROCEDURE — 3078F DIAST BP <80 MM HG: CPT | Mod: CPTII,S$GLB,, | Performed by: INTERNAL MEDICINE

## 2022-11-21 PROCEDURE — 99214 PR OFFICE/OUTPT VISIT, EST, LEVL IV, 30-39 MIN: ICD-10-PCS | Mod: S$GLB,,, | Performed by: INTERNAL MEDICINE

## 2022-11-21 PROCEDURE — 1126F PR PAIN SEVERITY QUANTIFIED, NO PAIN PRESENT: ICD-10-PCS | Mod: CPTII,S$GLB,, | Performed by: INTERNAL MEDICINE

## 2022-11-21 PROCEDURE — 1101F PT FALLS ASSESS-DOCD LE1/YR: CPT | Mod: CPTII,S$GLB,, | Performed by: INTERNAL MEDICINE

## 2022-11-21 PROCEDURE — 1159F PR MEDICATION LIST DOCUMENTED IN MEDICAL RECORD: ICD-10-PCS | Mod: CPTII,S$GLB,, | Performed by: INTERNAL MEDICINE

## 2022-11-21 PROCEDURE — 3075F SYST BP GE 130 - 139MM HG: CPT | Mod: CPTII,S$GLB,, | Performed by: INTERNAL MEDICINE

## 2022-11-21 PROCEDURE — 99999 PR PBB SHADOW E&M-EST. PATIENT-LVL III: ICD-10-PCS | Mod: PBBFAC,,, | Performed by: INTERNAL MEDICINE

## 2022-11-21 PROCEDURE — 3288F PR FALLS RISK ASSESSMENT DOCUMENTED: ICD-10-PCS | Mod: CPTII,S$GLB,, | Performed by: INTERNAL MEDICINE

## 2022-11-21 PROCEDURE — 3288F FALL RISK ASSESSMENT DOCD: CPT | Mod: CPTII,S$GLB,, | Performed by: INTERNAL MEDICINE

## 2022-11-21 PROCEDURE — 1101F PR PT FALLS ASSESS DOC 0-1 FALLS W/OUT INJ PAST YR: ICD-10-PCS | Mod: CPTII,S$GLB,, | Performed by: INTERNAL MEDICINE

## 2022-11-21 PROCEDURE — 99214 OFFICE O/P EST MOD 30 MIN: CPT | Mod: S$GLB,,, | Performed by: INTERNAL MEDICINE

## 2022-11-21 PROCEDURE — 1159F MED LIST DOCD IN RCRD: CPT | Mod: CPTII,S$GLB,, | Performed by: INTERNAL MEDICINE

## 2022-11-21 PROCEDURE — 3078F PR MOST RECENT DIASTOLIC BLOOD PRESSURE < 80 MM HG: ICD-10-PCS | Mod: CPTII,S$GLB,, | Performed by: INTERNAL MEDICINE

## 2022-11-21 PROCEDURE — 3075F PR MOST RECENT SYSTOLIC BLOOD PRESS GE 130-139MM HG: ICD-10-PCS | Mod: CPTII,S$GLB,, | Performed by: INTERNAL MEDICINE

## 2022-11-21 PROCEDURE — 1126F AMNT PAIN NOTED NONE PRSNT: CPT | Mod: CPTII,S$GLB,, | Performed by: INTERNAL MEDICINE

## 2022-11-21 NOTE — PROGRESS NOTES
[facility] Note:      Chief Complaint:   Encounter Diagnoses   Name Primary?    Malignant neoplasm of lower-outer quadrant of left breast of female, estrogen receptor positive Yes    Aromatase inhibitor use           Cancer Staging   Malignant neoplasm of lower-outer quadrant of left breast of female, estrogen receptor positive  Staging form: Breast, AJCC 8th Edition  - Clinical stage from 12/20/2021: cT2, cN0, cM0, G1, ER+, AR: Not Assessed, HER2: Not Assessed - Signed by Aliza Olvera MD on 1/6/2022      HPI:  Karis Briceño is a 82 y.o. female who presents today for follow up of breast cancer on NAET. She is tolerating anastrazole well without any issues. Patient has met with Dr Kruger and will continue on Anastrazole.     Oncology History  Patient felt a mass in the left breast in October 2021  Diagnostic mammogram 10/25/21 demonstrated bilateral breast masses with recommendation for biopsy  Bilateral breast biopsy 12/20/21 showed a R breast IDC, grade 1, diffuse staining for estrogen and a L breast fibroadenoma   Patient's pathology was initially sent to [place], therefore no receptors were initially run    Patient has a history of HRT which she took for 10 years. She stopped these >10 years ago.         Patient Active Problem List   Diagnosis    Essential hypertension    Cholelithiasis    Refused influenza vaccine    Overweight (BMI 25.0-29.9)    Pre-diabetes    Mixed hyperlipidemia    Poor dentition    Chronic left shoulder pain    Decreased strength of upper extremity    Decreased functional mobility    Impaired range of motion of cervical spine    Malignant neoplasm of lower-outer quadrant of left breast of female, estrogen receptor positive       Current Outpatient Medications   Medication Instructions    amLODIPine (NORVASC) 10 mg, Oral, Daily    anastrozole (ARIMIDEX) 1 mg, Oral, Daily    aspirin (ECOTRIN) 81 mg, Oral, Daily    atorvastatin (LIPITOR) 40  "mg, Oral, Daily    cyclobenzaprine (FLEXERIL) 5 mg, Oral, 3 times daily PRN    diclofenac sodium (VOLTAREN) 2 g, Topical (Top), 4 times daily PRN    fish oil-omega-3 fatty acids 300-1,000 mg capsule 1 capsule, Oral, Daily    hydroCHLOROthiazide (HYDRODIURIL) 25 mg, Oral, Daily    LIDOcaine (LIDODERM) 5 % 1 patch, Transdermal, Daily, Remove & Discard patch within 12 hours or as directed by MD freemanrtan (DIOVAN) 320 mg, Oral, Daily       Review of Systems:   Review of Systems   Constitutional: Negative.    HENT: Negative.     Respiratory: Negative.     Cardiovascular: Negative.    Gastrointestinal: Negative.    Musculoskeletal: Negative.    Neurological: Negative.    Endo/Heme/Allergies: Negative.    All other systems reviewed and are negative.    PHYSICAL EXAM:  /71   Pulse 81   Temp 98.2 °F (36.8 °C) (Oral)   Resp 16   Ht 5' 6" (1.676 m)   Wt 81.5 kg (179 lb 10.8 oz)   SpO2 96%   BMI 29.00 kg/m²     General Appearance:    Alert, cooperative, no distress, appears stated age   Lungs:     Clear to auscultation bilaterally, respirations unlabored    Heart:    Regular rate and rhythm, S1 and S2 normal   Breast Exam:    Bilateral breast normal. Unable to palpate L breast mass. No right masses, no tenderness, no skin or nipple abnormalities.  No lymphadenopathy.    Abdomen:     Soft, non-tender, bowel sounds active, no masses, no organomegaly   Extremities:   Extremities normal, atraumatic, no cyanosis or edema   Pulses:   2+ and symmetric all extremities   Skin:   Skin color, texture, turgor normal, no rashes or lesions   Lymph nodes:   Cervical, supraclavicular, and axillary nodes normal           Pertinent Labs & Imaging:  Diagnostic mammogram 10/25/21:   Impression:  Left  Mass: Left breast mass at the anterior 6 o'clock position. Assessment: 4 - Suspicious finding. Biopsy is recommended.   Right  Mass: Right breast 6 mm mass at the 10 o'clock position. Assessment: 4 - Suspicious finding. Biopsy is " recommended.   BI-RADS Category: Overall: 4 - Suspicious    Bilateral breast ultrasound 12/20/21  1. BREAST, LEFT, 06:00, BIOPSY:   -. Invasive ductal carcinoma, see [place] Consultative Report below.   - Size of invasive carcinoma:  3 MM in greatest linear dimension within a single core biopsy fragment.   - [place] Histologic Score: Grade 1 of 3.                     Tubule Formation:  2                     Nuclear Pleomorphism:  2                     Mitotic Activity:  1   - No definitive in-situ component identified.   - No lymphovascular invasion.   - Microcalcifications:  Not identified.   - Breast biomarkers will be performed upon return of materials from [facility].   2. BREAST, RIGHT, 10:00, BIOPSY:   - Benign breast tissue with stromal fibrosis and fibroadenomatoid nodules.   - Microcalcifications:  Not identified.   - Negative for atypia or malignancy.     [place] FINAL DIAGNOSIS:   Interpretation   Breast, core biopsy (12/20/2021):   1. Left breast, 6:00: Invasive ductal carcinoma with prominent cribriform growth pattern, [place] grade I (of III).   The submitted immunostains for SMA, p63 and CK 5/6 show absence of myoepithelial cell layer in the tumor. The tumor is diffusely positive for ER. These findings support the above diagnosis.   2. Right breast, 10:00: Fibroadenomatoid nodule.     Ultrasound 10/10/22: R breast 4mm x 5mm x 6mm (decreased from 7 mm x 6 mm x 7 mm in June 2022)    No results found for this or any previous visit (from the past 24 hour(s)).    Assessment & Plan:    1. Malignant neoplasm of lower-outer quadrant of left breast of female, estrogen receptor positive    2. Aromatase inhibitor use    Patient is on with anastrozole, initially as NAET intent. Overall she is tolerating this well without major side effects.   Follow up breast imaging on 10/10/22 showed continued NV to therapy  She met with Dr. Seo and has chosen not to proceed with surgery at this point. Will plan for AI  indefinitely.     We did discuss that one of the possible side effects while on an AI is bone loss or osteoporosis. To help prevent this possible side effect, we advised that she continue taking daily Calcium and Vitamin D supplements. The daily recommended doses are 1000 IU of Vitamin D and 1200mg of Calcium. She can buy these supplements, such as Oscal-D® or Caltrate®, at most local stores. If she has osteopenia or osteoporosis on bone density, we will discuss Prolia in the near future.     Joint pain is a common side effect of an AI. Pain and aching in the bones and joints can range from mild discomfort that goes away by itself, to severe achiness that may require medication. We have suggested using over-the-counter, non-steroidal anti-inflammatory medications like Ibuprofen if she were to experience this side effect.       Patient is agreeable to this plan.       Med Onc Chart Routing      Follow up with physician 4 months.   Follow up with CHRISTINA    Infusion scheduling note    Injection scheduling note    Labs    Imaging    Pharmacy appointment    Other referrals          Total time of this visit, including time spent face to face with patient and/or via video/audio, and also in preparing for today's visit for MDM and documentation. (Medical Decision Making, including consideration of possible diagnoses, management options, complex medical record review, review of diagnostic tests and information, consideration and discussion of significant complications based on comorbidities, and discussion with providers involved with the care of the patient) 30 minutes. Greater than 50% was spent face to face with the patient counseling and coordinating care.      Aliza Olvera MD  11/21/2022

## 2022-12-20 ENCOUNTER — HOSPITAL ENCOUNTER (OUTPATIENT)
Dept: RADIOLOGY | Facility: OTHER | Age: 82
Discharge: HOME | End: 2022-12-20
Attending: INTERNAL MEDICINE
Payer: MEDICARE

## 2022-12-20 DIAGNOSIS — Z78.0 POSTMENOPAUSAL: ICD-10-CM

## 2022-12-20 PROCEDURE — 77080 DXA BONE DENSITY AXIAL: CPT | Mod: 26,,, | Performed by: RADIOLOGY

## 2022-12-20 PROCEDURE — 77080 DXA BONE DENSITY AXIAL: CPT | Mod: TC

## 2022-12-20 PROCEDURE — 77080 DEXA BONE DENSITY SPINE HIP: ICD-10-PCS | Mod: 26,,, | Performed by: RADIOLOGY

## 2022-12-21 ENCOUNTER — TELEPHONE (OUTPATIENT)
Dept: INTERNAL MEDICINE | Facility: CLINIC | Age: 82
End: 2022-12-21
Payer: MEDICARE

## 2022-12-21 NOTE — TELEPHONE ENCOUNTER
----- Message from Vandana Smith MD sent at 12/20/2022  4:57 PM CST -----  Please schedule an in person appointment in 6 months for HTN.  Thanks!              ---------------------  DXA 12/20/2022 with normal BMD.

## 2022-12-28 DIAGNOSIS — E78.2 MIXED HYPERLIPIDEMIA: ICD-10-CM

## 2022-12-28 NOTE — TELEPHONE ENCOUNTER
Care Due:                  Date            Visit Type   Department     Provider  --------------------------------------------------------------------------------                                EP -                              PRIMARY      [facility] INTERNAL  Last Visit: 11-      CARE ([facility])   MEDICINE       Vandana Smith  Next Visit: None Scheduled  None         None Found                                                            Last  Test          Frequency    Reason                     Performed    Due Date  --------------------------------------------------------------------------------    Office Visit  12 months..  amLODIPine, atorvastatin,   11- 11-                             hydroCHLOROthiazide,                             valsartan................    CMP.........  12 months..  atorvastatin,              10-   10-                             hydroCHLOROthiazide,                             valsartan................    Lipid Panel.  12 months..  atorvastatin.............  03- 02-    [facility] Embedded Care Gaps. Reference number: [id number]. 12/28/2022   12:28:18 PM CST

## 2022-12-29 RX ORDER — ATORVASTATIN CALCIUM 40 MG/1
40 TABLET, FILM COATED ORAL DAILY
Qty: 90 TABLET | Refills: 0 | Status: SHIPPED | OUTPATIENT
Start: 2022-12-29 | End: 2023-04-03 | Stop reason: SDUPTHER

## 2022-12-29 NOTE — TELEPHONE ENCOUNTER
Refill Routing Note   Medication(s) are not appropriate for processing by [facility] for the following reason(s):      - Required laboratory values are outdated    [facility] action(s):  Defer Medication-related problems identified:   Requires labs  Requires appointment        Medication reconciliation completed: No     Appointments  past 12m or future 3m with PCP    Date Provider   Last Visit   11/24/2021 Vandana Smith MD   Next Visit   Visit date not found Vandana Smith MD   ED visits in past 90 days: 0        Note composed:8:53 PM 12/28/2022

## 2023-01-12 DIAGNOSIS — C50.512 MALIGNANT NEOPLASM OF LOWER-OUTER QUADRANT OF LEFT BREAST OF FEMALE, ESTROGEN RECEPTOR POSITIVE: ICD-10-CM

## 2023-01-12 DIAGNOSIS — Z17.0 MALIGNANT NEOPLASM OF LOWER-OUTER QUADRANT OF LEFT BREAST OF FEMALE, ESTROGEN RECEPTOR POSITIVE: ICD-10-CM

## 2023-01-12 RX ORDER — ANASTROZOLE 1 MG/1
1 TABLET ORAL DAILY
Qty: 30 TABLET | Refills: 11 | Status: CANCELLED | OUTPATIENT
Start: 2023-01-12 | End: 2024-01-12

## 2023-01-12 RX ORDER — ANASTROZOLE 1 MG/1
1 TABLET ORAL DAILY
Qty: 30 TABLET | Refills: 11 | Status: SHIPPED | OUTPATIENT
Start: 2023-01-12 | End: 2023-07-10 | Stop reason: SDUPTHER

## 2023-02-19 ENCOUNTER — HOSPITAL ENCOUNTER (EMERGENCY)
Facility: OTHER | Age: 83
Discharge: HOME | End: 2023-02-19
Attending: EMERGENCY MEDICINE
Payer: MEDICARE

## 2023-02-19 VITALS
HEIGHT: 66 IN | TEMPERATURE: 98 F | OXYGEN SATURATION: 98 % | SYSTOLIC BLOOD PRESSURE: 185 MMHG | HEART RATE: 78 BPM | RESPIRATION RATE: 18 BRPM | BODY MASS INDEX: 29.25 KG/M2 | DIASTOLIC BLOOD PRESSURE: 88 MMHG | WEIGHT: 182 LBS

## 2023-02-19 DIAGNOSIS — I10 HYPERTENSION, UNSPECIFIED TYPE: ICD-10-CM

## 2023-02-19 DIAGNOSIS — R52 PAIN: ICD-10-CM

## 2023-02-19 DIAGNOSIS — M77.9 BONE SPUR: ICD-10-CM

## 2023-02-19 DIAGNOSIS — S86.819A STRAIN OF CALF MUSCLE, INITIAL ENCOUNTER: Primary | ICD-10-CM

## 2023-02-19 DIAGNOSIS — M79.661 RIGHT CALF PAIN: ICD-10-CM

## 2023-02-19 PROCEDURE — 25000003 PHARM REV CODE 250: Performed by: EMERGENCY MEDICINE

## 2023-02-19 PROCEDURE — 96372 THER/PROPH/DIAG INJ SC/IM: CPT | Performed by: EMERGENCY MEDICINE

## 2023-02-19 PROCEDURE — 63600175 PHARM REV CODE 636 W HCPCS: Performed by: EMERGENCY MEDICINE

## 2023-02-19 PROCEDURE — 99285 EMERGENCY DEPT VISIT HI MDM: CPT | Mod: 25

## 2023-02-19 RX ORDER — OXYCODONE AND ACETAMINOPHEN 5; 325 MG/1; MG/1
1 TABLET ORAL EVERY 6 HOURS PRN
Qty: 5 TABLET | Refills: 0 | Status: SHIPPED | OUTPATIENT
Start: 2023-02-19 | End: 2023-02-22

## 2023-02-19 RX ORDER — OXYCODONE AND ACETAMINOPHEN 5; 325 MG/1; MG/1
1 TABLET ORAL
Status: COMPLETED | OUTPATIENT
Start: 2023-02-19 | End: 2023-02-19

## 2023-02-19 RX ORDER — LIDOCAINE 50 MG/G
1 PATCH TOPICAL DAILY PRN
Qty: 5 PATCH | Refills: 0 | Status: SHIPPED | OUTPATIENT
Start: 2023-02-19

## 2023-02-19 RX ORDER — LIDOCAINE 50 MG/G
1 PATCH TOPICAL
Status: DISCONTINUED | OUTPATIENT
Start: 2023-02-19 | End: 2023-02-19 | Stop reason: HOSPADM

## 2023-02-19 RX ORDER — ACETAMINOPHEN 325 MG/1
650 TABLET ORAL
Status: DISCONTINUED | OUTPATIENT
Start: 2023-02-19 | End: 2023-02-19

## 2023-02-19 RX ORDER — KETOROLAC TROMETHAMINE 30 MG/ML
30 INJECTION, SOLUTION INTRAMUSCULAR; INTRAVENOUS
Status: COMPLETED | OUTPATIENT
Start: 2023-02-19 | End: 2023-02-19

## 2023-02-19 RX ADMIN — KETOROLAC TROMETHAMINE 30 MG: 30 INJECTION, SOLUTION INTRAMUSCULAR; INTRAVENOUS at 03:02

## 2023-02-19 RX ADMIN — LIDOCAINE 1 PATCH: 50 PATCH CUTANEOUS at 04:02

## 2023-02-19 RX ADMIN — OXYCODONE HYDROCHLORIDE AND ACETAMINOPHEN 1 TABLET: 5; 325 TABLET ORAL at 04:02

## 2023-02-19 NOTE — ED PROVIDER NOTES
Encounter Date: 2/19/2023    SCRIBE #1 NOTE: I, Job Clemens, am scribing for, and in the presence of,  Dez Saravia MD. I have scribed the following portions of the note - Other sections scribed: HPI, ROS, PE.     History     Chief Complaint   Patient presents with    leg pain      Pt c.o right lower leg pain onset this AM. Pt states she had to use cane today to try to walk. Pt denies injury. Pt states pain worse when bearing weight.  AAO x 3 nadn skin w.d      Karis Briceño is a 82 y.o. female with a PMHx of HLD, HTN, and Malignant neoplasm of lower-outer quadrant of left breast who presents to the ED for evaluation of right lower extremity pain and discomfort beginning yesterday. Patient reports that she is unable to bear weight on leg. She reports that her pain is local to her lower leg around her calf. She denies any recent injuries or falls. She reports that she is currently taking Hydrochlorothiazide, Valsartan, Calcium, Norvasc, and chemotherapy. No other complaints.    The history is provided by the patient and a relative. No  was used.   Review of patient's allergies indicates:  No Known Allergies  Past Medical History:   Diagnosis Date    Gallstone pancreatitis 09/2016    Gallstones     Hyperlipidemia     Hypertension     Malignant neoplasm of lower-outer quadrant of left breast of female, estrogen receptor positive 1/6/2022     Past Surgical History:   Procedure Laterality Date    HYSTERECTOMY       Family History   Problem Relation Age of Onset    Heart attack Father     Heart disease Sister     No Known Problems Mother     Skin cancer Neg Hx      Social History     Tobacco Use    Smoking status: Never    Smokeless tobacco: Never   Substance Use Topics    Alcohol use: Yes     Comment: socially    Drug use: No     Review of Systems   Musculoskeletal:         Right lower extremity pain and discomfort.   All other systems reviewed and are negative.    Physical Exam     Initial  Vitals [02/19/23 1436]   BP Pulse Resp Temp SpO2   (!) 172/82 80 16 97.9 °F (36.6 °C) 98 %      MAP       --         Physical Exam    Nursing note and vitals reviewed.  Constitutional: She appears well-developed and well-nourished.   HENT:   Head: Normocephalic and atraumatic.   Eyes: Conjunctivae are normal.   Neck: Neck supple.   Musculoskeletal:         General: No edema.      Cervical back: Neck supple.      Comments: No tenderness over right plantar fascia or right knee. DP pulse 2+. Sensation intact to light touch. No edema of the foot. Not hot or warm to touch. No pitting edema. No tenderness over Achilles tendon. Achilles tendon intact. No tenderness over medial or lateral aspect of knee. No tenderness over palpated area of right knee. No palpable mass.     Neurological: She is alert and oriented to person, place, and time.   Skin: Skin is warm and dry.   Psychiatric: She has a normal mood and affect.       ED Course   Procedures  Labs Reviewed - No data to display       Imaging Results              X-Ray Tibia Fibula 2 View Right (Final result)  Result time 02/19/23 16:05:27      Final result by Dolores Bennett MD (02/19/23 16:05:27)                   Impression:      No acute osseous abnormality identified.      Electronically signed by: Dolores Bennett MD  Date:    02/19/2023  Time:    16:05               Narrative:    EXAMINATION:  XR TIBIA FIBULA 2 VIEW RIGHT    CLINICAL HISTORY:  Pain, unspecified    TECHNIQUE:  AP and lateral views of the right tibia and fibula were performed.    COMPARISON:  None.    FINDINGS:  No evidence of acute displaced fracture, dislocation, or osseous destructive process.  Medial and lateral compartment joint spaces of the knee appear fairly well preserved with mild spurring seen.  Prominent superior patellar spurring is visualized.  No abnormal widening of the ankle mortise.  Mild degenerative changes and spurring are seen within the ankle and hindfoot with posterior and  plantar calcaneal spurring also seen.                                       US Lower Extremity Veins Right (Final result)  Result time 02/19/23 15:26:54      Final result by Dolores Bennett MD (02/19/23 15:26:54)                   Impression:      No evidence of right lower extremity deep venous thrombosis.      Electronically signed by: Dolores Bennett MD  Date:    02/19/2023  Time:    15:26               Narrative:    EXAMINATION:  US LOWER EXTREMITY VEINS RIGHT    CLINICAL HISTORY:  Pain in right lower leg    TECHNIQUE:  Duplex and color flow Doppler evaluation of the right lower extremity veins was performed.    COMPARISON:  None    FINDINGS:  No evidence of clot involving the bilateral common femoral veins or right greater saphenous, femoral, popliteal, peroneal, anterior and posterior tibial veins.  All venous structures demonstrate normal respiratory phasicity and augment adequately.  No evidence of soft tissue mass or Baker's cyst.                                       Medications   ketorolac injection 30 mg (30 mg Intramuscular Given 2/19/23 1558)   oxyCODONE-acetaminophen 5-325 mg per tablet 1 tablet (1 tablet Oral Given 2/19/23 1600)     Medical Decision Making:   Independently Interpreted Test(s):   I have ordered and independently interpreted X-rays - see prior notes.  Clinical Tests:   Radiological Study: Ordered and Reviewed        Scribe Attestation:   Scribe #1: I performed the above scribed service and the documentation accurately describes the services I performed. I attest to the accuracy of the note.    Attending Attestation:             Attending ED Notes:   Emergent evaluation of 82-year-old female with complaint of right lower extremity pain and discomfort with ambulation.  Patient denies trauma.  Patient is neurovascularly intact without focal neurologic deficit.  No deformity, crepitus or step-offs.  X-rays are negative for any acute fractures or dislocations and positive for bone  spurring.  Ultrasound is negative for DVT.  Pulses are intact.  No clinical evidence of infection or cellulitis.  The patient is extensively counseled on their diagnosis and treatment including all diagnostic, laboratory and physical exam findings.  The patient is discharged good condition and directed follow-up with their PCP and orthopedics in the next 24-48 hours.          I, Dez Saravia, personally performed the services described in this documentation. All medical record entries made by the scribe were at my direction and in my presence. I have reviewed the chart and agree that the record reflects my personal performance and is accurate and complete.        Clinical Impression:   Final diagnoses:  [M79.661] Right calf pain  [R52] Pain  [M77.9] Bone spur  [S86.819A] Strain of calf muscle, initial encounter (Primary)  [I10] Hypertension, unspecified type        ED Disposition Condition    Discharge Good          ED Prescriptions       Medication Sig Dispense Start Date End Date Auth. Provider    LIDOcaine (LIDODERM) 5 % Place 1 patch onto the skin daily as needed (Muscle pain to right calf). Remove & Discard patch within 12 hours or as directed by MD 5 patch 2023 -- Dez Saravia MD    oxyCODONE-acetaminophen (PERCOCET) 5-325 mg per tablet () Take 1 tablet by mouth every 6 (six) hours as needed for Pain. Patient not taking:  Reported on 2023 5 tablet 2023 Dez Saravia MD          Follow-up Information       Follow up With Specialties Details Why Contact Info    Vandana Smith MD Internal Medicine On 2023  [address] [address]  [phone]      [facility] Orthopedics In 1 week  [facility] [address]  [phone]             Dez Saravia MD  [phone]

## 2023-02-19 NOTE — ED TRIAGE NOTES
Patient presents to the ED for complaints of right lower leg pain, states she was unable to walk this morning she woke up. She reports pain intensifies upon trying to bear weight. Denies any numbness or tingling in right foot or toes. States she took 2 Arthritis BC for pain this morning which provided some relief.   Pt is AAOx3, family at bedside.

## 2023-02-19 NOTE — FIRST PROVIDER EVALUATION
Emergency Department TeleTriage Encounter Note      CHIEF COMPLAINT    Chief Complaint   Patient presents with    leg pain      Pt c.o right lower leg pain onset this AM. Pt states she had to use cane today to try to walk. Pt denies injury. Pt states pain worse when bearing weight.  AAO x 3 nadn skin w.d        VITAL SIGNS   Initial Vitals [02/19/23 1436]   BP Pulse Resp Temp SpO2   (!) 172/82 80 16 97.9 °F (36.6 °C) 98 %      MAP       --            ALLERGIES    Review of patient's allergies indicates:  No Known Allergies    PROVIDER TRIAGE NOTE  Patient presents with pain in the right calf since this morning. No known injury. Denies any pain in the foot. She is having trouble ambulating.       ORDERS  Labs Reviewed - No data to display    ED Orders (720h ago, onward)      None              Virtual Visit Note: The provider triage portion of this emergency department evaluation and documentation was performed via [employer], a HIPAA-compliant telemedicine application, in concert with a tele-presenter in the room. A face to face patient evaluation with one of my colleagues will occur once the patient is placed in an emergency department room.      DISCLAIMER: This note was prepared with [employer] voice recognition transcription software. Garbled syntax, mangled pronouns, and other bizarre constructions may be attributed to that software system.

## 2023-02-20 ENCOUNTER — TELEPHONE (OUTPATIENT)
Dept: INTERNAL MEDICINE | Facility: CLINIC | Age: 83
End: 2023-02-20
Payer: MEDICARE

## 2023-02-20 NOTE — TELEPHONE ENCOUNTER
----- Message from Rosalia Plunkett sent at 2/20/2023  2:52 PM CST -----  Name of Who is Calling: RAYA MCPHERSON [[id number]]          What is the request in detail: Patient requesting a call back to discuss why she is scheduled for 2 mammograms and what the difference is              Can the clinic reply by [facility]: No              What Number to Call Back if not in [facility]: [phone]

## 2023-02-20 NOTE — TELEPHONE ENCOUNTER
Called pt  She said she needs hosp f/u this week with one of our providers       Looks like there is a hosp f/u Friday of this week w/ Dr. Hinkle but unable to book appt  Routing to her to ask if ok to get overridden      Held slot

## 2023-02-20 NOTE — TELEPHONE ENCOUNTER
----- Message from Rosalia Plunkett sent at 2/20/2023  2:54 PM CST -----  Regarding: Sooner Appt Request  Who Is Calling : RAYA MCPHERSON [[id number]]        Reason For The Call: Patient is requesting a sooner appointment.  Patient declined first available and does not want to be added to the waitlist.  Please contact the patient to schedule.        Preferred Contact Method: [phone]        Additional Information: Hospital F/U

## 2023-02-22 NOTE — TELEPHONE ENCOUNTER
Called pt due to nature of complaint to get more info.  Pt states she's having a hard time due to many things going on. We reviewed.   Pt was in ER over the weekend for leg pain and was dx with strain and bone spur. Pt still can not straighten leg and has Ortho appt tomorrow. Pt works and wants to be able to go back to work on Monday.    Pt also has appt's set up tomorrow for US and Mammo - would love to have them at  (where ortho appt is), but we discussed that rescheduling will delay mammo appt. Pt states she will go to both campuses tomorrow if needed. I will send message to Dr. Seo's staff to verify this is correct timing of US and diag mammo as pt is being managed by him for Breast CA.    Also, pt wants to know when her f/u with Dr. Olvera should be as she's unsure. I did not see in notes, so routing message to that office as well.

## 2023-02-23 ENCOUNTER — HOSPITAL ENCOUNTER (OUTPATIENT)
Dept: RADIOLOGY | Facility: OTHER | Age: 83
Discharge: HOME | End: 2023-02-23
Attending: INTERNAL MEDICINE
Payer: MEDICARE

## 2023-02-23 ENCOUNTER — OFFICE VISIT (OUTPATIENT)
Dept: ORTHOPEDICS | Facility: CLINIC | Age: 83
End: 2023-02-23
Payer: MEDICARE

## 2023-02-23 ENCOUNTER — HOSPITAL ENCOUNTER (OUTPATIENT)
Dept: RADIOLOGY | Facility: HOSPITAL | Age: 83
Discharge: HOME | End: 2023-02-23
Attending: PHYSICIAN ASSISTANT
Payer: MEDICARE

## 2023-02-23 ENCOUNTER — OFFICE VISIT (OUTPATIENT)
Dept: INTERNAL MEDICINE | Facility: CLINIC | Age: 83
End: 2023-02-23
Payer: MEDICARE

## 2023-02-23 VITALS — HEIGHT: 66 IN | BODY MASS INDEX: 29.02 KG/M2 | WEIGHT: 180.56 LBS

## 2023-02-23 VITALS
HEIGHT: 66 IN | HEART RATE: 74 BPM | BODY MASS INDEX: 29.02 KG/M2 | OXYGEN SATURATION: 98 % | DIASTOLIC BLOOD PRESSURE: 68 MMHG | SYSTOLIC BLOOD PRESSURE: 144 MMHG | WEIGHT: 180.56 LBS

## 2023-02-23 DIAGNOSIS — M25.561 ACUTE PAIN OF RIGHT KNEE: Primary | ICD-10-CM

## 2023-02-23 DIAGNOSIS — M17.11 PRIMARY OSTEOARTHRITIS OF RIGHT KNEE: ICD-10-CM

## 2023-02-23 DIAGNOSIS — M25.561 ACUTE PAIN OF RIGHT KNEE: ICD-10-CM

## 2023-02-23 DIAGNOSIS — R92.8 ABNORMAL MAMMOGRAM: ICD-10-CM

## 2023-02-23 PROCEDURE — 99213 OFFICE O/P EST LOW 20 MIN: CPT | Mod: S$GLB,,, | Performed by: INTERNAL MEDICINE

## 2023-02-23 PROCEDURE — 1159F PR MEDICATION LIST DOCUMENTED IN MEDICAL RECORD: ICD-10-PCS | Mod: CPTII,S$GLB,, | Performed by: PHYSICIAN ASSISTANT

## 2023-02-23 PROCEDURE — 1125F PR PAIN SEVERITY QUANTIFIED, PAIN PRESENT: ICD-10-PCS | Mod: CPTII,S$GLB,, | Performed by: PHYSICIAN ASSISTANT

## 2023-02-23 PROCEDURE — 3288F PR FALLS RISK ASSESSMENT DOCUMENTED: ICD-10-PCS | Mod: CPTII,S$GLB,, | Performed by: INTERNAL MEDICINE

## 2023-02-23 PROCEDURE — 99999 PR PBB SHADOW E&M-EST. PATIENT-LVL IV: ICD-10-PCS | Mod: PBBFAC,,, | Performed by: INTERNAL MEDICINE

## 2023-02-23 PROCEDURE — 77065 DX MAMMO INCL CAD UNI: CPT | Mod: 26,LT,, | Performed by: RADIOLOGY

## 2023-02-23 PROCEDURE — 1101F PR PT FALLS ASSESS DOC 0-1 FALLS W/OUT INJ PAST YR: ICD-10-PCS | Mod: CPTII,S$GLB,, | Performed by: PHYSICIAN ASSISTANT

## 2023-02-23 PROCEDURE — 76642 US BREAST LEFT LIMITED: ICD-10-PCS | Mod: 26,LT,, | Performed by: RADIOLOGY

## 2023-02-23 PROCEDURE — 3077F SYST BP >= 140 MM HG: CPT | Mod: CPTII,S$GLB,, | Performed by: INTERNAL MEDICINE

## 2023-02-23 PROCEDURE — 3078F DIAST BP <80 MM HG: CPT | Mod: CPTII,S$GLB,, | Performed by: INTERNAL MEDICINE

## 2023-02-23 PROCEDURE — 3078F PR MOST RECENT DIASTOLIC BLOOD PRESSURE < 80 MM HG: ICD-10-PCS | Mod: CPTII,S$GLB,, | Performed by: INTERNAL MEDICINE

## 2023-02-23 PROCEDURE — 3077F PR MOST RECENT SYSTOLIC BLOOD PRESSURE >= 140 MM HG: ICD-10-PCS | Mod: CPTII,S$GLB,, | Performed by: INTERNAL MEDICINE

## 2023-02-23 PROCEDURE — 1101F PR PT FALLS ASSESS DOC 0-1 FALLS W/OUT INJ PAST YR: ICD-10-PCS | Mod: CPTII,S$GLB,, | Performed by: INTERNAL MEDICINE

## 2023-02-23 PROCEDURE — 73560 X-RAY EXAM OF KNEE 1 OR 2: CPT | Mod: 26,LT,, | Performed by: RADIOLOGY

## 2023-02-23 PROCEDURE — 1125F PR PAIN SEVERITY QUANTIFIED, PAIN PRESENT: ICD-10-PCS | Mod: CPTII,S$GLB,, | Performed by: INTERNAL MEDICINE

## 2023-02-23 PROCEDURE — 1159F MED LIST DOCD IN RCRD: CPT | Mod: CPTII,S$GLB,, | Performed by: INTERNAL MEDICINE

## 2023-02-23 PROCEDURE — 99999 PR PBB SHADOW E&M-EST. PATIENT-LVL IV: CPT | Mod: PBBFAC,,, | Performed by: INTERNAL MEDICINE

## 2023-02-23 PROCEDURE — 99213 PR OFFICE/OUTPT VISIT, EST, LEVL III, 20-29 MIN: ICD-10-PCS | Mod: S$GLB,,, | Performed by: INTERNAL MEDICINE

## 2023-02-23 PROCEDURE — 1160F RVW MEDS BY RX/DR IN RCRD: CPT | Mod: CPTII,S$GLB,, | Performed by: PHYSICIAN ASSISTANT

## 2023-02-23 PROCEDURE — 73562 X-RAY EXAM OF KNEE 3: CPT | Mod: 26,RT,, | Performed by: RADIOLOGY

## 2023-02-23 PROCEDURE — 73560 X-RAY EXAM OF KNEE 1 OR 2: CPT | Mod: TC,59,LT

## 2023-02-23 PROCEDURE — 77061 MAMMO DIGITAL DIAGNOSTIC LEFT WITH TOMO: ICD-10-PCS | Mod: 26,LT,, | Performed by: RADIOLOGY

## 2023-02-23 PROCEDURE — 76642 ULTRASOUND BREAST LIMITED: CPT | Mod: 26,LT,, | Performed by: RADIOLOGY

## 2023-02-23 PROCEDURE — 3288F PR FALLS RISK ASSESSMENT DOCUMENTED: ICD-10-PCS | Mod: CPTII,S$GLB,, | Performed by: PHYSICIAN ASSISTANT

## 2023-02-23 PROCEDURE — 99999 PR PBB SHADOW E&M-EST. PATIENT-LVL III: CPT | Mod: PBBFAC,,, | Performed by: PHYSICIAN ASSISTANT

## 2023-02-23 PROCEDURE — 77061 BREAST TOMOSYNTHESIS UNI: CPT | Mod: TC,LT

## 2023-02-23 PROCEDURE — 73560 XR KNEE ORTHO RIGHT: ICD-10-PCS | Mod: 26,LT,, | Performed by: RADIOLOGY

## 2023-02-23 PROCEDURE — 1160F PR REVIEW ALL MEDS BY PRESCRIBER/CLIN PHARMACIST DOCUMENTED: ICD-10-PCS | Mod: CPTII,S$GLB,, | Performed by: INTERNAL MEDICINE

## 2023-02-23 PROCEDURE — 3288F FALL RISK ASSESSMENT DOCD: CPT | Mod: CPTII,S$GLB,, | Performed by: PHYSICIAN ASSISTANT

## 2023-02-23 PROCEDURE — 77065 MAMMO DIGITAL DIAGNOSTIC LEFT WITH TOMO: ICD-10-PCS | Mod: 26,LT,, | Performed by: RADIOLOGY

## 2023-02-23 PROCEDURE — 99203 PR OFFICE/OUTPT VISIT, NEW, LEVL III, 30-44 MIN: ICD-10-PCS | Mod: S$GLB,,, | Performed by: PHYSICIAN ASSISTANT

## 2023-02-23 PROCEDURE — 73562 XR KNEE ORTHO RIGHT: ICD-10-PCS | Mod: 26,RT,, | Performed by: RADIOLOGY

## 2023-02-23 PROCEDURE — 3288F FALL RISK ASSESSMENT DOCD: CPT | Mod: CPTII,S$GLB,, | Performed by: INTERNAL MEDICINE

## 2023-02-23 PROCEDURE — 1125F AMNT PAIN NOTED PAIN PRSNT: CPT | Mod: CPTII,S$GLB,, | Performed by: INTERNAL MEDICINE

## 2023-02-23 PROCEDURE — 1160F RVW MEDS BY RX/DR IN RCRD: CPT | Mod: CPTII,S$GLB,, | Performed by: INTERNAL MEDICINE

## 2023-02-23 PROCEDURE — 76642 ULTRASOUND BREAST LIMITED: CPT | Mod: TC,LT

## 2023-02-23 PROCEDURE — 1159F PR MEDICATION LIST DOCUMENTED IN MEDICAL RECORD: ICD-10-PCS | Mod: CPTII,S$GLB,, | Performed by: INTERNAL MEDICINE

## 2023-02-23 PROCEDURE — 1101F PT FALLS ASSESS-DOCD LE1/YR: CPT | Mod: CPTII,S$GLB,, | Performed by: PHYSICIAN ASSISTANT

## 2023-02-23 PROCEDURE — 1159F MED LIST DOCD IN RCRD: CPT | Mod: CPTII,S$GLB,, | Performed by: PHYSICIAN ASSISTANT

## 2023-02-23 PROCEDURE — 99999 PR PBB SHADOW E&M-EST. PATIENT-LVL III: ICD-10-PCS | Mod: PBBFAC,,, | Performed by: PHYSICIAN ASSISTANT

## 2023-02-23 PROCEDURE — 99203 OFFICE O/P NEW LOW 30 MIN: CPT | Mod: S$GLB,,, | Performed by: PHYSICIAN ASSISTANT

## 2023-02-23 PROCEDURE — 1125F AMNT PAIN NOTED PAIN PRSNT: CPT | Mod: CPTII,S$GLB,, | Performed by: PHYSICIAN ASSISTANT

## 2023-02-23 PROCEDURE — 1101F PT FALLS ASSESS-DOCD LE1/YR: CPT | Mod: CPTII,S$GLB,, | Performed by: INTERNAL MEDICINE

## 2023-02-23 PROCEDURE — 77061 BREAST TOMOSYNTHESIS UNI: CPT | Mod: 26,LT,, | Performed by: RADIOLOGY

## 2023-02-23 PROCEDURE — 1160F PR REVIEW ALL MEDS BY PRESCRIBER/CLIN PHARMACIST DOCUMENTED: ICD-10-PCS | Mod: CPTII,S$GLB,, | Performed by: PHYSICIAN ASSISTANT

## 2023-02-23 RX ORDER — MELOXICAM 7.5 MG/1
7.5 TABLET ORAL DAILY
Qty: 14 TABLET | Refills: 0 | Status: SHIPPED | OUTPATIENT
Start: 2023-02-23 | End: 2023-03-09

## 2023-02-23 NOTE — PROGRESS NOTES
SUBJECTIVE:     Chief Complaint   Patient presents with    Right Knee - Pain    Right Foot - Pain    Right Ankle - Pain    Right Lower Leg - Pain       History of Present Illness:  Karis Briceño is a 82 y.o. year old female here with complaints of constant right knee pain which started about 5 days ago.  There is not a history of trauma.  The pain is located in the global aspect of the knee.  The pain is described as achy. There is not radiation.  There is not catching or locking.  Aggravating factors include standing and walking.  She has noticed some swelling.  There is not numbness or tingling of the lower extremity.  Previous treatments include acetaminophen, rest, and topical creams which have provided minimal relief.  She was seen in the ED and in primary care- had ultrasound and x-rays that were negative for blood clot or other significant abnormality.  There is not a history of previous injury or surgery to the knee.  The patient does use an assistive device.    Review of patient's allergies indicates:  No Known Allergies      Current Outpatient Medications   Medication Sig Dispense Refill    amLODIPine (NORVASC) 10 MG tablet Take 1 tablet (10 mg total) by mouth once daily. 90 tablet 2    anastrozole (ARIMIDEX) 1 mg Tab Take 1 tablet (1 mg total) by mouth once daily. 30 tablet 11    atorvastatin (LIPITOR) 40 MG tablet Take 1 tablet (40 mg total) by mouth once daily. (Patient taking differently: Take 40 mg by mouth once daily. Takes every other day) 90 tablet 0    fish oil-omega-3 fatty acids 300-1,000 mg capsule Take 1 capsule by mouth once daily.      hydroCHLOROthiazide (HYDRODIURIL) 25 MG tablet Take 1 tablet (25 mg total) by mouth once daily. 90 tablet 2    LIDOcaine (LIDODERM) 5 % Place 1 patch onto the skin daily as needed (Muscle pain to right calf). Remove & Discard patch within 12 hours or as directed by MD 5 patch 0    valsartan (DIOVAN) 320 MG tablet Take 1 tablet (320 mg total) by mouth once  "daily. 90 tablet 2    aspirin (ECOTRIN) 81 MG EC tablet Take 1 tablet (81 mg total) by mouth once daily. (Patient not taking: Reported on 6/23/2022) 90 tablet 3    diclofenac sodium (VOLTAREN) 1 % Gel Apply 2 g topically 4 (four) times daily as needed. (Patient not taking: Reported on 6/23/2022) 100 g 11     No current facility-administered medications for this visit.       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  Neurological: Mood & affect are normal.  HEENT: NCAT, sclera nonicteric   Lungs: Respirations are equal and unlabored.   CV: 2+ bilateral upper and lower extremity pulses.   Skin: Intact throughout with no rashes, erythema, or lesions  Extremities: No LE edema, no erythema or warmth of the skin in either lower extremity.    right Knee Exam:  Knee Range of Motion: 5-110   Effusion: none  Condition of skin: intact and no warmth  Location of tenderness: Medial joint line and Lateral joint line   Strength: 5 of 5 quadriceps strength and 5 of 5 hamstring strength  Stability:  stable to testing  Varus /Valgus stress:  normal      Right Hip Examination:  full painless range of motion, without tenderness    IMAGING:    X-rays of the right knee, personally reviewed by me, demonstrate moderate-severe DJD with joint space narrowing, osteophyte formation.  No fracture or " dislocation.     U/S negative for DVT    ASSESSMENT/PLAN:   82 y.o. year old female with right knee osteoarthritis    Plan: We discussed with the patient at length all the different treatment options available for the knee including NSAIDS, acetaminophen, rest, ice, knee strengthening exercise, steroid injections for temporary relief, Viscosupplementation, and knee arthroplasty.     - She was offered CSI today but declined  - Continue rest, ice, topical creams  - Advised to limit NSAIDS- will send in prescription for meloxicam to take x 14 days then stop.  - Follow up if symptoms worsen or fail to improve

## 2023-02-23 NOTE — PROGRESS NOTES
Subjective:       Patient ID: Karis Briceño is a 82 y.o. female.    Chief Complaint: Leg Pain    Pt here for same day visit as her PCP is unavailable today. She c/o pain in R knee with some radiation distally into upper leg that started 4 days ago. No known inciting event/trauma. It hurts when walking so has been sitting a lot. Prior to the onset she was walking independently but now in wheelchair. She went to ED where she had u/s and xray which were unrevealing--no evidence for DVT. No cp/sob. No swelling in leg. She was given toradol in ED which she feels helped. Was also given percocet but caused drowsiness so stopped.   Review of Systems   Constitutional:  Negative for fever.   Respiratory:  Negative for shortness of breath.    Cardiovascular:  Negative for chest pain.   Musculoskeletal:  Positive for leg pain.   Neurological:  Negative for weakness.       Objective:      Physical Exam  Constitutional:       Appearance: Normal appearance.   Cardiovascular:      Rate and Rhythm: Normal rate and regular rhythm.      Heart sounds: Normal heart sounds.   Pulmonary:      Effort: Pulmonary effort is normal.      Breath sounds: Normal breath sounds.   Musculoskeletal:      Right lower leg: No edema.      Left lower leg: No edema.      Comments: Pain in R knee at medial/lateral joint line; reduced ROM due to pain--unable to straighten. Neg homans bilat LE.   Neurological:      Mental Status: She is alert.   Psychiatric:         Mood and Affect: Mood normal.         Behavior: Behavior normal.       Assessment:       Problem List Items Addressed This Visit    None  Visit Diagnoses       Acute pain of right knee    -  Primary            Plan:       She has ortho appt this afternoon so encouraged to keep that  RTC prompts d/w pt and she understood  Voltaren gel prn

## 2023-03-15 ENCOUNTER — OFFICE VISIT (OUTPATIENT)
Dept: ORTHOPEDICS | Facility: CLINIC | Age: 83
End: 2023-03-15
Payer: MEDICARE

## 2023-03-15 VITALS — WEIGHT: 180.88 LBS | HEIGHT: 66 IN | BODY MASS INDEX: 29.07 KG/M2

## 2023-03-15 DIAGNOSIS — M17.11 PRIMARY OSTEOARTHRITIS OF RIGHT KNEE: Primary | ICD-10-CM

## 2023-03-15 PROCEDURE — 1160F PR REVIEW ALL MEDS BY PRESCRIBER/CLIN PHARMACIST DOCUMENTED: ICD-10-PCS | Mod: CPTII,S$GLB,, | Performed by: PHYSICIAN ASSISTANT

## 2023-03-15 PROCEDURE — 3288F FALL RISK ASSESSMENT DOCD: CPT | Mod: CPTII,S$GLB,, | Performed by: PHYSICIAN ASSISTANT

## 2023-03-15 PROCEDURE — 1159F MED LIST DOCD IN RCRD: CPT | Mod: CPTII,S$GLB,, | Performed by: PHYSICIAN ASSISTANT

## 2023-03-15 PROCEDURE — 3288F PR FALLS RISK ASSESSMENT DOCUMENTED: ICD-10-PCS | Mod: CPTII,S$GLB,, | Performed by: PHYSICIAN ASSISTANT

## 2023-03-15 PROCEDURE — 99999 PR PBB SHADOW E&M-EST. PATIENT-LVL III: CPT | Mod: PBBFAC,,, | Performed by: PHYSICIAN ASSISTANT

## 2023-03-15 PROCEDURE — 99213 PR OFFICE/OUTPT VISIT, EST, LEVL III, 20-29 MIN: ICD-10-PCS | Mod: 25,S$GLB,, | Performed by: PHYSICIAN ASSISTANT

## 2023-03-15 PROCEDURE — 1160F RVW MEDS BY RX/DR IN RCRD: CPT | Mod: CPTII,S$GLB,, | Performed by: PHYSICIAN ASSISTANT

## 2023-03-15 PROCEDURE — 1101F PR PT FALLS ASSESS DOC 0-1 FALLS W/OUT INJ PAST YR: ICD-10-PCS | Mod: CPTII,S$GLB,, | Performed by: PHYSICIAN ASSISTANT

## 2023-03-15 PROCEDURE — 99999 PR PBB SHADOW E&M-EST. PATIENT-LVL III: ICD-10-PCS | Mod: PBBFAC,,, | Performed by: PHYSICIAN ASSISTANT

## 2023-03-15 PROCEDURE — 99213 OFFICE O/P EST LOW 20 MIN: CPT | Mod: 25,S$GLB,, | Performed by: PHYSICIAN ASSISTANT

## 2023-03-15 PROCEDURE — 1125F PR PAIN SEVERITY QUANTIFIED, PAIN PRESENT: ICD-10-PCS | Mod: CPTII,S$GLB,, | Performed by: PHYSICIAN ASSISTANT

## 2023-03-15 PROCEDURE — 1159F PR MEDICATION LIST DOCUMENTED IN MEDICAL RECORD: ICD-10-PCS | Mod: CPTII,S$GLB,, | Performed by: PHYSICIAN ASSISTANT

## 2023-03-15 PROCEDURE — 1125F AMNT PAIN NOTED PAIN PRSNT: CPT | Mod: CPTII,S$GLB,, | Performed by: PHYSICIAN ASSISTANT

## 2023-03-15 PROCEDURE — 1101F PT FALLS ASSESS-DOCD LE1/YR: CPT | Mod: CPTII,S$GLB,, | Performed by: PHYSICIAN ASSISTANT

## 2023-03-15 PROCEDURE — 20610 LARGE JOINT ASPIRATION/INJECTION: R KNEE: ICD-10-PCS | Mod: RT,S$GLB,, | Performed by: PHYSICIAN ASSISTANT

## 2023-03-15 PROCEDURE — 20610 DRAIN/INJ JOINT/BURSA W/O US: CPT | Mod: RT,S$GLB,, | Performed by: PHYSICIAN ASSISTANT

## 2023-03-15 RX ORDER — TRIAMCINOLONE ACETONIDE 40 MG/ML
40 INJECTION, SUSPENSION INTRA-ARTICULAR; INTRAMUSCULAR
Status: DISCONTINUED | OUTPATIENT
Start: 2023-03-15 | End: 2023-03-15 | Stop reason: HOSPADM

## 2023-03-15 RX ADMIN — TRIAMCINOLONE ACETONIDE 40 MG: 40 INJECTION, SUSPENSION INTRA-ARTICULAR; INTRAMUSCULAR at 02:03

## 2023-03-15 NOTE — PROGRESS NOTES
"Patient ID: Karis Briceño is a 82 y.o. female.    Chief Complaint: Pain and Follow-up of the Right Knee      HISTORY:  Karis Briceño is a 82 y.o. female who returns to me today for follow up of right knee pain.  She was last seen by me 2/23/2023.  Today she is doing well, but notes she still has pain in her knee.  She is still having difficulty straightening the knee.  She is ambulating with a cane.  She would like to try an injection today.       PMH/PSH/FamHx/SocHx:    Unchanged from prior visit.    ROS:  Constitution: Negative for chills, fever and weakness.   Respiratory: Negative for cough and shortness of breath.   Musculoskeletal: Positive for right knee pain  Psychiatric/Behavioral: The patient is not nervous/anxious.       PHYSICAL EXAM:   Ht 5' 5.98" (1.676 m)   Wt 82.1 kg (180 lb 14.2 oz)   BMI 29.21 kg/m²   Right knee  Skin intact  ROM   Mild effusion  No warmth    ASSESSMENT/PLAN:    Karis was seen today for pain and follow-up.    Diagnoses and all orders for this visit:    Primary osteoarthritis of right knee  -     Large Joint Aspiration/Injection: R knee        Right knee CSI today- see procedure note  Recommend rest, ice, activity modification  Follow up as needed      "

## 2023-03-15 NOTE — PROCEDURES
Large Joint Aspiration/Injection: R knee    Date/Time: 3/15/2023 2:30 PM  Performed by: Mildred Medina PA-C  Authorized by: Mildred Medina PA-C     Consent Done?:  Yes (Verbal)  Indications:  Pain  Prep: patient was prepped and draped in usual sterile fashion    Local anesthetic:  Topical anesthetic    Details:  Needle Size:  22 G  Approach:  Anterolateral  Location:  Knee  Site:  R knee  Medications:  40 mg triamcinolone acetonide 40 mg/mL  Patient tolerance:  Patient tolerated the procedure well with no immediate complications

## 2023-03-30 ENCOUNTER — TELEPHONE (OUTPATIENT)
Dept: ORTHOPEDICS | Facility: CLINIC | Age: 83
End: 2023-03-30
Payer: MEDICARE

## 2023-03-30 RX ORDER — GABAPENTIN 300 MG/1
300 CAPSULE ORAL NIGHTLY
Qty: 30 CAPSULE | Refills: 0 | Status: SHIPPED | OUTPATIENT
Start: 2023-03-30 | End: 2023-06-06 | Stop reason: SDUPTHER

## 2023-03-30 NOTE — TELEPHONE ENCOUNTER
Called patient about knee pain - she has a little improvement in pain from the injection.  Still has pain at night and numbness and tingling.    Will try gabapentin  She will follow up if no improvement- discussed hyaluronic acid injections

## 2023-04-03 DIAGNOSIS — I10 ESSENTIAL HYPERTENSION: ICD-10-CM

## 2023-04-03 DIAGNOSIS — E78.2 MIXED HYPERLIPIDEMIA: ICD-10-CM

## 2023-04-03 RX ORDER — ATORVASTATIN CALCIUM 40 MG/1
40 TABLET, FILM COATED ORAL DAILY
Qty: 90 TABLET | Refills: 0 | Status: SHIPPED | OUTPATIENT
Start: 2023-04-03 | End: 2023-07-03 | Stop reason: SDUPTHER

## 2023-04-03 RX ORDER — VALSARTAN 320 MG/1
320 TABLET ORAL DAILY
Qty: 90 TABLET | Refills: 0 | Status: SHIPPED | OUTPATIENT
Start: 2023-04-03 | End: 2023-07-03 | Stop reason: SDUPTHER

## 2023-04-03 RX ORDER — AMLODIPINE BESYLATE 10 MG/1
10 TABLET ORAL DAILY
Qty: 90 TABLET | Refills: 0 | Status: SHIPPED | OUTPATIENT
Start: 2023-04-03 | End: 2023-07-03 | Stop reason: SDUPTHER

## 2023-04-03 RX ORDER — HYDROCHLOROTHIAZIDE 25 MG/1
25 TABLET ORAL DAILY
Qty: 90 TABLET | Refills: 0 | Status: SHIPPED | OUTPATIENT
Start: 2023-04-03 | End: 2023-07-03 | Stop reason: SDUPTHER

## 2023-04-03 NOTE — TELEPHONE ENCOUNTER
Refill Routing Note   Medication(s) are not appropriate for processing by [facility] for the following reason(s):      Patient not seen by PCP within 15 months  Patient seen in ED/Hospital since LOV with PCP  Required labs outdated  Required vitals abnormal    [facility] action(s):  Defer Labs due          Appointments  past 12m or future 3m with PCP    Date Provider   Last Visit   11/24/2021 Vandana Smith MD   Next Visit   Visit date not found Vandana Smith MD   ED visits in past 90 days: 1        Note composed:10:19 AM 04/03/2023

## 2023-04-03 NOTE — TELEPHONE ENCOUNTER
Refill Encounter    PCP Visits: Recent Visits  No visits were found meeting these conditions.  Showing recent visits within past 360 days and meeting all other requirements  Future Appointments  No visits were found meeting these conditions.  Showing future appointments within next 720 days and meeting all other requirements     Last 3 Blood Pressure:   BP Readings from Last 3 Encounters:   02/23/23 (!) 144/68   02/19/23 (!) 185/88   11/21/22 134/71     Preferred Pharmacy:   [facility]  [address] [facility] [address]  [facility] [address]  Phone: [phone] Fax: [phone]    [id number] - [place], [place] - [address] AT [facility] [address]  [address]  [facility] [address]  Phone: [phone] Fax: [phone]    Requested RX:  Requested Prescriptions     Pending Prescriptions Disp Refills    valsartan (DIOVAN) 320 MG tablet 90 tablet 2     Sig: Take 1 tablet (320 mg total) by mouth once daily.    hydroCHLOROthiazide (HYDRODIURIL) 25 MG tablet 90 tablet 2     Sig: Take 1 tablet (25 mg total) by mouth once daily.    amLODIPine (NORVASC) 10 MG tablet 90 tablet 2     Sig: Take 1 tablet (10 mg total) by mouth once daily.    atorvastatin (LIPITOR) 40 MG tablet 90 tablet 0     Sig: Take 1 tablet (40 mg total) by mouth once daily.      RX Route: Normal

## 2023-04-03 NOTE — TELEPHONE ENCOUNTER
Care Due:                  Date            Visit Type   Department     Provider  --------------------------------------------------------------------------------                                EP -                              PRIMARY      [facility] INTERNAL  Last Visit: 02-      [facility] ([facility]   MEDICINE       Dennis Fischer  Next Visit: None Scheduled  None         None Found                                                            Last  Test          Frequency    Reason                     Performed    Due Date  --------------------------------------------------------------------------------    CMP.........  12 months..  atorvastatin,              10-   10-                             hydroCHLOROthiazide,                             valsartan................    Lipid Panel.  12 months..  atorvastatin.............  03- 02-    [facility] Embedded Care Gaps. Reference number: [id number]. 4/03/2023   9:26:00 AM CDT

## 2023-04-10 ENCOUNTER — OFFICE VISIT (OUTPATIENT)
Dept: HEMATOLOGY/ONCOLOGY | Facility: CLINIC | Age: 83
End: 2023-04-10
Payer: MEDICARE

## 2023-04-10 VITALS
WEIGHT: 185.75 LBS | DIASTOLIC BLOOD PRESSURE: 80 MMHG | TEMPERATURE: 98 F | HEIGHT: 65 IN | RESPIRATION RATE: 18 BRPM | OXYGEN SATURATION: 95 % | SYSTOLIC BLOOD PRESSURE: 154 MMHG | HEART RATE: 91 BPM | BODY MASS INDEX: 30.95 KG/M2

## 2023-04-10 DIAGNOSIS — Z79.811 AROMATASE INHIBITOR USE: ICD-10-CM

## 2023-04-10 DIAGNOSIS — Z17.0 MALIGNANT NEOPLASM OF LOWER-OUTER QUADRANT OF LEFT BREAST OF FEMALE, ESTROGEN RECEPTOR POSITIVE: Primary | ICD-10-CM

## 2023-04-10 DIAGNOSIS — C50.512 MALIGNANT NEOPLASM OF LOWER-OUTER QUADRANT OF LEFT BREAST OF FEMALE, ESTROGEN RECEPTOR POSITIVE: Primary | ICD-10-CM

## 2023-04-10 PROCEDURE — 3079F PR MOST RECENT DIASTOLIC BLOOD PRESSURE 80-89 MM HG: ICD-10-PCS | Mod: CPTII,S$GLB,, | Performed by: INTERNAL MEDICINE

## 2023-04-10 PROCEDURE — 3079F DIAST BP 80-89 MM HG: CPT | Mod: CPTII,S$GLB,, | Performed by: INTERNAL MEDICINE

## 2023-04-10 PROCEDURE — 1126F PR PAIN SEVERITY QUANTIFIED, NO PAIN PRESENT: ICD-10-PCS | Mod: CPTII,S$GLB,, | Performed by: INTERNAL MEDICINE

## 2023-04-10 PROCEDURE — 99214 PR OFFICE/OUTPT VISIT, EST, LEVL IV, 30-39 MIN: ICD-10-PCS | Mod: S$GLB,,, | Performed by: INTERNAL MEDICINE

## 2023-04-10 PROCEDURE — 99999 PR PBB SHADOW E&M-EST. PATIENT-LVL III: CPT | Mod: PBBFAC,,, | Performed by: INTERNAL MEDICINE

## 2023-04-10 PROCEDURE — 3077F SYST BP >= 140 MM HG: CPT | Mod: CPTII,S$GLB,, | Performed by: INTERNAL MEDICINE

## 2023-04-10 PROCEDURE — 99214 OFFICE O/P EST MOD 30 MIN: CPT | Mod: S$GLB,,, | Performed by: INTERNAL MEDICINE

## 2023-04-10 PROCEDURE — 3077F PR MOST RECENT SYSTOLIC BLOOD PRESSURE >= 140 MM HG: ICD-10-PCS | Mod: CPTII,S$GLB,, | Performed by: INTERNAL MEDICINE

## 2023-04-10 PROCEDURE — 99999 PR PBB SHADOW E&M-EST. PATIENT-LVL III: ICD-10-PCS | Mod: PBBFAC,,, | Performed by: INTERNAL MEDICINE

## 2023-04-10 PROCEDURE — 1126F AMNT PAIN NOTED NONE PRSNT: CPT | Mod: CPTII,S$GLB,, | Performed by: INTERNAL MEDICINE

## 2023-04-10 NOTE — PROGRESS NOTES
[facility] Note:      Chief Complaint:   Encounter Diagnoses   Name Primary?    Malignant neoplasm of lower-outer quadrant of left breast of female, estrogen receptor positive Yes    Aromatase inhibitor use             Cancer Staging   Malignant neoplasm of lower-outer quadrant of left breast of female, estrogen receptor positive  Staging form: Breast, AJCC 8th Edition  - Clinical stage from 12/20/2021: cT2, cN0, cM0, G1, ER+, IA: Not Assessed, HER2: Not Assessed - Signed by Aliza Olvera MD on 1/6/2022        HPI:  Karis Briceño is a 82 y.o. female who presents today for follow up of breast cancer on NAET. She is tolerating anastrazole well without any issues. Patient has opted against surgery and will continue on Anastrazole. She is doing well.     Oncology History  Patient felt a mass in the left breast in October 2021  Diagnostic mammogram 10/25/21 demonstrated bilateral breast masses with recommendation for biopsy  Bilateral breast biopsy 12/20/21 showed a R breast IDC, grade 1, diffuse staining for estrogen and a L breast fibroadenoma   Patient's pathology was initially sent to [facility], therefore no receptors were initially run    Patient has a history of HRT which she took for 10 years. She stopped these >10 years ago.         Patient Active Problem List   Diagnosis    Essential hypertension    Cholelithiasis    Refused influenza vaccine    Overweight (BMI 25.0-29.9)    Pre-diabetes    Mixed hyperlipidemia    Poor dentition    Chronic left shoulder pain    Decreased strength of upper extremity    Decreased functional mobility    Impaired range of motion of cervical spine    Malignant neoplasm of lower-outer quadrant of left breast of female, estrogen receptor positive       Current Outpatient Medications   Medication Instructions    amLODIPine (NORVASC) 10 mg, Oral, Daily    anastrozole (ARIMIDEX) 1 mg, Oral, Daily    aspirin (ECOTRIN) 81 mg, Oral, Daily     "atorvastatin (LIPITOR) 40 mg, Oral, Daily    diclofenac sodium (VOLTAREN) 2 g, Topical (Top), 4 times daily PRN    fish oil-omega-3 fatty acids 300-1,000 mg capsule 1 capsule, Oral, Daily    gabapentin (NEURONTIN) 300 MG capsule Take 1 capsule (300 mg total) by mouth every evening. Increase to 3 times per day as needed    hydroCHLOROthiazide (HYDRODIURIL) 25 mg, Oral, Daily    LIDOcaine (LIDODERM) 5 % 1 patch, Transdermal, Daily PRN, Remove & Discard patch within 12 hours or as directed by MD freemanrtan (DIOVAN) 320 mg, Oral, Daily       Review of Systems:   Review of Systems   Constitutional: Negative.    HENT: Negative.     Respiratory: Negative.     Cardiovascular: Negative.    Gastrointestinal: Negative.    Musculoskeletal: Negative.    Neurological: Negative.    Endo/Heme/Allergies: Negative.    All other systems reviewed and are negative.    PHYSICAL EXAM:  BP (!) 154/80 (BP Location: Left arm, Patient Position: Sitting, BP Method: Large (Automatic))   Pulse 91   Temp 97.9 °F (36.6 °C)   Resp 18   Ht 5' 5" (1.651 m)   Wt 84.2 kg (185 lb 11.8 oz)   SpO2 95%   BMI 30.91 kg/m²     General Appearance:    Alert, cooperative, no distress, appears stated age   Lungs:     Clear to auscultation bilaterally, respirations unlabored    Heart:    Regular rate and rhythm, S1 and S2 normal   Breast Exam:    Bilateral breast normal. Unable to palpate L breast mass. No right masses, no tenderness, no skin or nipple abnormalities.  No lymphadenopathy.    Abdomen:     Soft, non-tender, bowel sounds active, no masses, no organomegaly   Extremities:   Extremities normal, atraumatic, no cyanosis or edema   Pulses:   2+ and symmetric all extremities   Skin:   Skin color, texture, turgor normal, no rashes or lesions   Lymph nodes:   Cervical, supraclavicular, and axillary nodes normal           Pertinent Labs & Imaging:  Diagnostic mammogram 10/25/21:   Impression:  Left  Mass: Left breast mass at the anterior 6 o'clock " position. Assessment: 4 - Suspicious finding. Biopsy is recommended.   Right  Mass: Right breast 6 mm mass at the 10 o'clock position. Assessment: 4 - Suspicious finding. Biopsy is recommended.   BI-RADS Category: Overall: 4 - Suspicious    Bilateral breast ultrasound 12/20/21  1. BREAST, LEFT, 06:00, BIOPSY:   -. Invasive ductal carcinoma, see [facility] Consultative Report below.   - Size of invasive carcinoma:  3 MM in greatest linear dimension within a single core biopsy fragment.   - Flow[place] Histologic Score: Grade 1 of 3.                     Tubule Formation:  2                     Nuclear Pleomorphism:  2                     Mitotic Activity:  1   - No definitive in-situ component identified.   - No lymphovascular invasion.   - Microcalcifications:  Not identified.   - Breast biomarkers will be performed upon return of materials from [facility].   2. BREAST, RIGHT, 10:00, BIOPSY:   - Benign breast tissue with stromal fibrosis and fibroadenomatoid nodules.   - Microcalcifications:  Not identified.   - Negative for atypia or malignancy.     [facility] FINAL DIAGNOSIS:   Interpretation   Breast, core biopsy (12/20/2021):   1. Left breast, 6:00: Invasive ductal carcinoma with prominent cribriform growth pattern, Flow[place] grade I (of III).   The submitted immunostains for SMA, p63 and CK 5/6 show absence of myoepithelial cell layer in the tumor. The tumor is diffusely positive for ER. These findings support the above diagnosis.   2. Right breast, 10:00: Fibroadenomatoid nodule.     Ultrasound 10/10/22: R breast 4mm x 5mm x 6mm (decreased from 7 mm x 6 mm x 7 mm in June 2022)    No results found for this or any previous visit (from the past 24 hour(s)).    Assessment & Plan:    1. Malignant neoplasm of lower-outer quadrant of left breast of female, estrogen receptor positive    2. Aromatase inhibitor use      Patient is on with anastrozole, initially as NAET intent. Overall she is tolerating this well without major side  effects.   Follow up breast imaging on 2/23/23 showed continued [place] to therapy  She met with Dr. Seo and has chosen not to proceed with surgery at this point. Will plan for AI indefinitely.     We did discuss that one of the possible side effects while on an AI is bone loss or osteoporosis. To help prevent this possible side effect, we advised that she continue taking daily Calcium and Vitamin D supplements. The daily recommended doses are 1000 IU of Vitamin D and 1200mg of Calcium. She can buy these supplements, such as Oscal-D® or Caltrate®, at most local stores. If she has osteopenia or osteoporosis on bone density, we will discuss Prolia in the near future.     Joint pain is a common side effect of an AI. Pain and aching in the bones and joints can range from mild discomfort that goes away by itself, to severe achiness that may require medication. We have suggested using over-the-counter, non-steroidal anti-inflammatory medications like Ibuprofen if she were to experience this side effect.     Patient is agreeable to this plan.       Med Onc Chart Routing      Follow up with physician 4 months.   Follow up with CHRISTINA    Infusion scheduling note    Injection scheduling note    Labs    Imaging    Pharmacy appointment    Other referrals             Total time of this visit, including time spent face to face with patient and/or via video/audio, and also in preparing for today's visit for MDM and documentation. (Medical Decision Making, including consideration of possible diagnoses, management options, complex medical record review, review of diagnostic tests and information, consideration and discussion of significant complications based on comorbidities, and discussion with providers involved with the care of the patient) 30 minutes. Greater than 50% was spent face to face with the patient counseling and coordinating care.      Aliza Olvera MD  04/10/2023

## 2023-05-30 ENCOUNTER — TELEPHONE (OUTPATIENT)
Dept: HEMATOLOGY/ONCOLOGY | Facility: CLINIC | Age: 83
End: 2023-05-30
Payer: MEDICARE

## 2023-05-30 NOTE — TELEPHONE ENCOUNTER
----- Message from Jesus Feliciano sent at 5/30/2023  9:15 AM CDT -----  Regarding: appt  Contact: @[phone]  Pt calling to get appt states she got letter in mail to sched due for f/u in July ... pls call and adv@[phone]

## 2023-06-01 ENCOUNTER — TELEPHONE (OUTPATIENT)
Dept: ORTHOPEDICS | Facility: CLINIC | Age: 83
End: 2023-06-01
Payer: MEDICARE

## 2023-06-01 RX ORDER — METHYLPREDNISOLONE 4 MG/1
TABLET ORAL
Qty: 21 EACH | Refills: 0 | Status: SHIPPED | OUTPATIENT
Start: 2023-06-01 | End: 2023-06-22

## 2023-06-01 NOTE — TELEPHONE ENCOUNTER
"Patient called with leg pain- "nerve" pain coming down her entire leg. Will send in medrol dose pack- she has follow up scheduled if pain is not improving  "

## 2023-06-06 ENCOUNTER — TELEPHONE (OUTPATIENT)
Dept: ORTHOPEDICS | Facility: CLINIC | Age: 83
End: 2023-06-06
Payer: MEDICARE

## 2023-06-06 ENCOUNTER — HOSPITAL ENCOUNTER (EMERGENCY)
Facility: OTHER | Age: 83
Discharge: HOME | End: 2023-06-06
Attending: EMERGENCY MEDICINE
Payer: MEDICARE

## 2023-06-06 VITALS
HEART RATE: 69 BPM | DIASTOLIC BLOOD PRESSURE: 81 MMHG | SYSTOLIC BLOOD PRESSURE: 163 MMHG | WEIGHT: 180 LBS | HEIGHT: 66 IN | TEMPERATURE: 98 F | RESPIRATION RATE: 18 BRPM | OXYGEN SATURATION: 98 % | BODY MASS INDEX: 28.93 KG/M2

## 2023-06-06 DIAGNOSIS — R20.2 PARESTHESIA OF RIGHT LEG: Primary | ICD-10-CM

## 2023-06-06 PROCEDURE — 99284 EMERGENCY DEPT VISIT MOD MDM: CPT

## 2023-06-06 PROCEDURE — 25000003 PHARM REV CODE 250: Performed by: EMERGENCY MEDICINE

## 2023-06-06 RX ORDER — OXYCODONE AND ACETAMINOPHEN 5; 325 MG/1; MG/1
1 TABLET ORAL EVERY 6 HOURS PRN
Qty: 12 TABLET | Refills: 0 | Status: SHIPPED | OUTPATIENT
Start: 2023-06-06 | End: 2023-07-13

## 2023-06-06 RX ORDER — GABAPENTIN 300 MG/1
300 CAPSULE ORAL 3 TIMES DAILY
Status: DISCONTINUED | OUTPATIENT
Start: 2023-06-06 | End: 2023-06-06 | Stop reason: HOSPADM

## 2023-06-06 RX ORDER — OXYCODONE AND ACETAMINOPHEN 5; 325 MG/1; MG/1
1 TABLET ORAL
Status: COMPLETED | OUTPATIENT
Start: 2023-06-06 | End: 2023-06-06

## 2023-06-06 RX ORDER — GABAPENTIN 300 MG/1
300 CAPSULE ORAL NIGHTLY
Qty: 30 CAPSULE | Refills: 0 | Status: SHIPPED | OUTPATIENT
Start: 2023-06-06 | End: 2023-06-23 | Stop reason: SDUPTHER

## 2023-06-06 RX ADMIN — GABAPENTIN 300 MG: 300 CAPSULE ORAL at 07:06

## 2023-06-06 RX ADMIN — OXYCODONE HYDROCHLORIDE AND ACETAMINOPHEN 1 TABLET: 5; 325 TABLET ORAL at 07:06

## 2023-06-06 NOTE — FIRST PROVIDER EVALUATION
Emergency Department TeleTriage Encounter Note      CHIEF COMPLAINT    Chief Complaint   Patient presents with    Leg Pain     Pt presents to the ED w/ c/o R leg pain. Pt states she received an injection in her R knee x 2 months ago and is now having pain in her R hip down to her R calf. Pt also reporting ambulation issues due to the pain. Pt taking prednisone daily with no relief in s/s. Pt taking chemo pills.        VITAL SIGNS   Initial Vitals [06/06/23 1728]   BP Pulse Resp Temp SpO2   (!) 176/87 77 18 98 °F (36.7 °C) 97 %      MAP       --            ALLERGIES    Review of patient's allergies indicates:  No Known Allergies    PROVIDER TRIAGE NOTE  This is a teletriage evaluation of a 82 y.o. female presenting to the ED with c/o right foot pain and radiates up to her right hip.  Also reports that her foot was cold, Ortho instructed patient to come to the ED to check circulation.   Limited physical exam via telehealth: The patient is awake, alert, answering questions appropriately and is not in respiratory distress.  As the Teletriage provider, I performed an initial assessment and ordered appropriate labs and imaging studies, if any, to facilitate the patient's care once placed in the ED. Once a room is available, care and a full evaluation will be completed by an alternate ED provider.  Any additional orders and the final disposition will be determined by that provider.  All imaging and labs will not be followed-up by the Teletriage Team, including myself.      ORDERS  Labs Reviewed - No data to display    ED Orders (720h ago, onward)      Start Ordered     Status Ordering Provider    Unscheduled 06/06/23 1809  Nursing communication  Once        Comments: Please check DP and PT pulses    Ordered MARK ANTHONY GONZALEZ A            Virtual Visit Note: The provider triage portion of this emergency department evaluation and documentation was performed via [employer], a HIPAA-compliant telemedicine application, in concert  with a tele-presenter in the room. A face to face patient evaluation with one of my colleagues will occur once the patient is placed in an emergency department room.      DISCLAIMER: This note was prepared with "[employer] voice recognition transcription software. Garbled syntax, mangled pronouns, and other bizarre constructions may be attributed to that software system.

## 2023-06-06 NOTE — ED TRIAGE NOTES
Chronic pain from right hip to right knee x 2 months. Currently taking prescribed oral steroids with no relief. Using cane to walk. No hi/o injury. States able to bear weigt but with a lot of pain. Presents awake, alert.

## 2023-06-06 NOTE — TELEPHONE ENCOUNTER
Called patient about message and informed her if her circulation is being cut off she needs to go to the emergency room. She informed me it wasn't that bad she didn't need to go to the ER.     ----- Message from Kathie Covarrubias sent at 6/6/2023  3:33 PM CDT -----  Regarding: leg in pain               Pt is experiencing severe pain in her leg. She feels the circulation is being cut because her foot is cold. She is scheduled to be seen 6-13 but feels the need to be seen sooner. Please give her a call  back to further discuss.         [phone]

## 2023-06-07 NOTE — ED PROVIDER NOTES
"Encounter Date: 6/6/2023    SCRIBE #1 NOTE: I, David Chang, am scribing for, and in the presence of,  Arpan Johnson II, MD. I have scribed the following portions of the note - Other sections scribed: HPI, ROS, PE.     History     Chief Complaint   Patient presents with    Leg Pain     Pt presents to the ED w/ c/o R leg pain. Pt states she received an injection in her R knee x 2 months ago and is now having pain in her R hip down to her R calf. Pt also reporting ambulation issues due to the pain. Pt taking prednisone daily with no relief in s/s. Pt taking chemo pills.      Time seen by provider: 7:19 PM    This is a 82 y.o. female who presents with complaint of right leg pain that began around 2 weeks ago. She reports the pain shoots up her leg and goes to her back, and described the sensation as "pins" ion her leg. She reports being mostly unable to walk, as ambulation with pressure make the pain begin. She denies any back pain. She additionally reports having a Cortizone shot in her right knee recently. She also reports being on a chemo pill. She admits to a history of drinking, though not in the last week. Patient denies any other complaints at this time.    The history is provided by the patient.   Review of patient's allergies indicates:  No Known Allergies  Past Medical History:   Diagnosis Date    Gallstone pancreatitis 09/2016    Gallstones     Hyperlipidemia     Hypertension     Malignant neoplasm of lower-outer quadrant of left breast of female, estrogen receptor positive 1/6/2022     Past Surgical History:   Procedure Laterality Date    HYSTERECTOMY       Family History   Problem Relation Age of Onset    Heart attack Father     Heart disease Sister     No Known Problems Mother     Skin cancer Neg Hx      Social History     Tobacco Use    Smoking status: Never    Smokeless tobacco: Never   Substance Use Topics    Alcohol use: Yes     Comment: socially    Drug use: No     Review of Systems  SEE " HPI.  Physical Exam     Initial Vitals [06/06/23 1728]   BP Pulse Resp Temp SpO2   (!) 176/87 77 18 98 °F (36.7 °C) 97 %      MAP       --         Physical Exam    Constitutional: She appears well-developed and well-nourished. She is not diaphoretic. No distress.   HENT:   Head: Normocephalic and atraumatic.   Right Ear: External ear normal.   Left Ear: External ear normal.   Nose: Nose normal.   Mouth/Throat: Oropharynx is clear and moist.   Eyes: Conjunctivae and EOM are normal. Pupils are equal, round, and reactive to light. No scleral icterus.   No pallor   Neck: Neck supple. No JVD present.   Normal range of motion.  Cardiovascular:            Pulses:       Dorsalis pedis pulses are 2+ on the right side.   Musculoskeletal:         General: No edema. Normal range of motion.      Cervical back: Normal range of motion and neck supple.      Right lower leg: No bony tenderness.      Right foot: Normal range of motion and normal capillary refill.      Left foot: Normal range of motion and normal capillary refill.      Comments: Right foot warm to touch.  Trace edema bilaterally.  Normal ROM and Strength.     Lymphadenopathy:     She has no cervical adenopathy.   Neurological: She is alert and oriented to person, place, and time. She has normal strength. No cranial nerve deficit or sensory deficit.   Skin: Skin is warm and dry. Capillary refill takes less than 2 seconds. No rash noted.   Psychiatric: She has a normal mood and affect. Her behavior is normal. Judgment and thought content normal.       ED Course   Procedures  Labs Reviewed - No data to display       Imaging Results    None          Medications   oxyCODONE-acetaminophen 5-325 mg per tablet 1 tablet (1 tablet Oral Given 6/6/23 1939)     Medical Decision Making:   History:   Old Medical Records: I decided to obtain old medical records.     Patient presents complaining of pain which radiates through the right leg, from hip down to foot, particularly painful  when walking.  Recent evaluation for knee pain but this is improved with injection she received some time ago.  Was also started on Medrol Dosepak but is not feeling much relief from it her symptoms sound like paresthesias, possibly from sciatica or related to chemotherapy.  Chart review shows she is supposed to be on gabapentin but is apparently not taking this.  Will place on brief course of p.r.n. Percocet for pain relief while restarting the Percocet.  She already has follow-up scheduled with Orthopedics.  Daughter at bedside.  Understand plan of care and return precautions.       Scribe Attestation:   Scribe #1: I performed the above scribed service and the documentation accurately describes the services I performed. I attest to the accuracy of the note.       Physician Attestation for Scribe: I, [facility], reviewed documentation as scribed in my presence, which is both accurate and complete.             Clinical Impression:   Final diagnoses:  [R20.2] Paresthesia of right leg (Primary)        ED Disposition Condition    Discharge Stable          ED Prescriptions       Medication Sig Dispense Start Date End Date Auth. Provider    oxyCODONE-acetaminophen (PERCOCET) 5-325 mg per tablet Take 1 tablet by mouth every 6 (six) hours as needed for Pain. 12 tablet 6/6/2023 -- Arpan Johnson II, MD    gabapentin (NEURONTIN) 300 MG capsule Take 1 capsule (300 mg total) by mouth every evening. Increase to 3 times per day as needed 30 capsule 6/6/2023 7/6/2023 Arpan Johnson II, MD          Follow-up Information    None          Arpan Johnson II, MD  06/07/23 0109

## 2023-06-09 NOTE — TELEPHONE ENCOUNTER
-- DO NOT REPLY / DO NOT REPLY ALL --  -- Message is from Engagement Center Operations (ECO) --    ONLY TO BE USED WITHIN A REFILL MEDICATION ENCOUNTER    Med Refill  Is the patient currently having any symptoms?: No/Non-Emergent symptoms    Name of medication requested: See pended med    Has patient contacted the pharmacy? Yes    Is this the first request for the medication in the last 48 hours?: No, review previous medication refill encounter in patient chart to determine what decision was made regarding the previous request and inform patient. If patient wishes to speak with Clinical Team Member, proceed with Med Refill encounter. If patient does not wish to speak with Clinical Team member, close encounter and do not route.    Patient is requesting a medication refill - medication is on active medication list    Patient is currently OUT of the requested medication - sent as HIGH priority  Patient stated renata has not received prescription request from provider. Please advise.    Full name of the provider who ordered the medication: Josephine Kennedy MD  Clinic site name / Account # for provider: [facility]: Pharmacy  [id number] [place] [address]    Patient confirmed the above pharmacy as correct?  Yes      Caller Information       Type Contact Phone/Fax    06/09/2023 12:07 PM CDT Phone (Incoming) Danyell Grissom (Self) [phone] (M)          Alternative phone number: none     Can a detailed message be left?: Yes    Patient is completely out of medication: Verify if patient is currently experiencing symptoms. If patient is symptomatic, proceed with front end triage instead of medication refill. If patient is not symptomatic but is completely out of medication, aguilar as High priority when routing. Inform patient: “Please call back with any questions or concerns and if your condition becomes life threatening, you should seek  ----- Message from Javier Nino sent at 2/21/2020  7:48 AM CST -----  Contact: RAYA MCPHERSON   Name of Who is Calling: RAYA MCPHERSON       What is the request in detail: Patient states her BP meds are out and is verifying if her BP medication should be refilled before her next appt      Can the clinic reply by [facility]: NO      What Number to Call Back [facility] [phone]                                     immediate medical assistance by calling 911 or going to the Emergency Department for evaluation.”    Inform all patients: \"If the clinical team needs to contact you regarding this refill, please be aware the return phone call may come from an unidentified or out of state phone number and your refill request will be addressed as soon as the clinical team reviews your message.\"

## 2023-06-13 ENCOUNTER — OFFICE VISIT (OUTPATIENT)
Dept: ORTHOPEDICS | Facility: CLINIC | Age: 83
End: 2023-06-13
Payer: MEDICARE

## 2023-06-13 ENCOUNTER — HOSPITAL ENCOUNTER (OUTPATIENT)
Dept: RADIOLOGY | Facility: HOSPITAL | Age: 83
Discharge: HOME | End: 2023-06-13
Attending: PHYSICIAN ASSISTANT
Payer: MEDICARE

## 2023-06-13 VITALS — WEIGHT: 179.88 LBS | HEIGHT: 66 IN | BODY MASS INDEX: 28.91 KG/M2

## 2023-06-13 DIAGNOSIS — M25.571 ACUTE RIGHT ANKLE PAIN: Primary | ICD-10-CM

## 2023-06-13 DIAGNOSIS — R52 PAIN: ICD-10-CM

## 2023-06-13 DIAGNOSIS — M54.16 LUMBAR RADICULOPATHY: ICD-10-CM

## 2023-06-13 DIAGNOSIS — M79.604 RIGHT LEG PAIN: ICD-10-CM

## 2023-06-13 PROCEDURE — 73502 X-RAY EXAM HIP UNI 2-3 VIEWS: CPT | Mod: 26,RT,, | Performed by: RADIOLOGY

## 2023-06-13 PROCEDURE — 73610 XR ANKLE COMPLETE 3 VIEW RIGHT: ICD-10-PCS | Mod: 26,RT,, | Performed by: RADIOLOGY

## 2023-06-13 PROCEDURE — 73502 X-RAY EXAM HIP UNI 2-3 VIEWS: CPT | Mod: TC,RT

## 2023-06-13 PROCEDURE — 99999 PR PBB SHADOW E&M-EST. PATIENT-LVL III: ICD-10-PCS | Mod: PBBFAC,,, | Performed by: PHYSICIAN ASSISTANT

## 2023-06-13 PROCEDURE — 3288F FALL RISK ASSESSMENT DOCD: CPT | Mod: CPTII,S$GLB,, | Performed by: PHYSICIAN ASSISTANT

## 2023-06-13 PROCEDURE — 73610 X-RAY EXAM OF ANKLE: CPT | Mod: 26,RT,, | Performed by: RADIOLOGY

## 2023-06-13 PROCEDURE — 1125F PR PAIN SEVERITY QUANTIFIED, PAIN PRESENT: ICD-10-PCS | Mod: CPTII,S$GLB,, | Performed by: PHYSICIAN ASSISTANT

## 2023-06-13 PROCEDURE — 73610 X-RAY EXAM OF ANKLE: CPT | Mod: TC,RT

## 2023-06-13 PROCEDURE — 3288F PR FALLS RISK ASSESSMENT DOCUMENTED: ICD-10-PCS | Mod: CPTII,S$GLB,, | Performed by: PHYSICIAN ASSISTANT

## 2023-06-13 PROCEDURE — 1125F AMNT PAIN NOTED PAIN PRSNT: CPT | Mod: CPTII,S$GLB,, | Performed by: PHYSICIAN ASSISTANT

## 2023-06-13 PROCEDURE — 1159F PR MEDICATION LIST DOCUMENTED IN MEDICAL RECORD: ICD-10-PCS | Mod: CPTII,S$GLB,, | Performed by: PHYSICIAN ASSISTANT

## 2023-06-13 PROCEDURE — 1101F PT FALLS ASSESS-DOCD LE1/YR: CPT | Mod: CPTII,S$GLB,, | Performed by: PHYSICIAN ASSISTANT

## 2023-06-13 PROCEDURE — 99213 PR OFFICE/OUTPT VISIT, EST, LEVL III, 20-29 MIN: ICD-10-PCS | Mod: S$GLB,,, | Performed by: PHYSICIAN ASSISTANT

## 2023-06-13 PROCEDURE — 1101F PR PT FALLS ASSESS DOC 0-1 FALLS W/OUT INJ PAST YR: ICD-10-PCS | Mod: CPTII,S$GLB,, | Performed by: PHYSICIAN ASSISTANT

## 2023-06-13 PROCEDURE — 99213 OFFICE O/P EST LOW 20 MIN: CPT | Mod: S$GLB,,, | Performed by: PHYSICIAN ASSISTANT

## 2023-06-13 PROCEDURE — 1160F RVW MEDS BY RX/DR IN RCRD: CPT | Mod: CPTII,S$GLB,, | Performed by: PHYSICIAN ASSISTANT

## 2023-06-13 PROCEDURE — 73502 XR HIP WITH PELVIS WHEN PERFORMED, 2 OR 3  VIEWS RIGHT: ICD-10-PCS | Mod: 26,RT,, | Performed by: RADIOLOGY

## 2023-06-13 PROCEDURE — 1160F PR REVIEW ALL MEDS BY PRESCRIBER/CLIN PHARMACIST DOCUMENTED: ICD-10-PCS | Mod: CPTII,S$GLB,, | Performed by: PHYSICIAN ASSISTANT

## 2023-06-13 PROCEDURE — 99999 PR PBB SHADOW E&M-EST. PATIENT-LVL III: CPT | Mod: PBBFAC,,, | Performed by: PHYSICIAN ASSISTANT

## 2023-06-13 PROCEDURE — 1159F MED LIST DOCD IN RCRD: CPT | Mod: CPTII,S$GLB,, | Performed by: PHYSICIAN ASSISTANT

## 2023-06-13 NOTE — PROGRESS NOTES
"Patient ID: Karis Briceño is a 82 y.o. female.    Chief Complaint: Pain of the Right Lower Leg, Pain of the Right Hip, and Pain of the Right Ankle      HISTORY:  Karis Briceño is a 82 y.o. female who returns to me today for follow up of right leg pain.  She was last seen by me 3/15/2023- she did have relief with injection in her knee.  Today she is doing well, but notes over the past several weeks she has worsening pain in her entire right leg.  Pain is in her low back, thigh and into her ankle.  She has decreased sensation and paresthesias.  She did not have any new fall or injury.  She has tried medrol dose pack, gapapentin and pain medication without improvement.       PMH/PSH/FamHx/SocHx:    Unchanged from prior visit.    ROS:  Constitution: Negative for chills, fever and weakness.   Respiratory: Negative for cough and shortness of breath.   Musculoskeletal: Positive for right leg pain  Psychiatric/Behavioral: The patient is not nervous/anxious.       PHYSICAL EXAM:   Ht 5' 5.98" (1.676 m)   Wt 81.6 kg (179 lb 14.3 oz)   BMI 29.05 kg/m²   Right hip  Skin intact  No TTP to GTB  No pain with passive hip ROM  - log roll  + SLR  5/5 bilateral LE strength testing    IMAGING: X-rays of the right hip, personally reviewed by me, demonstrate mild DJD.  No fracture or dislocation.     X-rays of the right ankle, personally reviewed by me, demonstrate mild degenerative changes.  No fracture or dislocation.     ASSESSMENT/PLAN:    Karis was seen today for pain, pain and pain.    Diagnoses and all orders for this visit:    Acute right ankle pain  -     X-Ray Hip 2 or 3 views Right (with Pelvis when performed); Future  -     X-Ray Ankle Complete Right; Future    Lumbar radiculopathy  -     Ambulatory referral/consult to Physical/Occupational Therapy; Future    Right leg pain  -     Ambulatory referral/consult to Physical/Occupational Therapy; Future      - Acute right side lumbar radiculopathy. She has had symptoms for " the past 3 weeks  - Discussed continuing gabapentin at night  - Try OTC ibuprofen  - Referral to PT- [facility]   - If pain continues or worsening symptoms, have her follow up with back and spine

## 2023-06-23 ENCOUNTER — TELEPHONE (OUTPATIENT)
Dept: ORTHOPEDICS | Facility: CLINIC | Age: 83
End: 2023-06-23
Payer: MEDICARE

## 2023-06-23 RX ORDER — GABAPENTIN 300 MG/1
CAPSULE ORAL
Qty: 30 CAPSULE | Refills: 0 | Status: SHIPPED | OUTPATIENT
Start: 2023-06-23 | End: 2023-07-10 | Stop reason: SDUPTHER

## 2023-06-23 NOTE — TELEPHONE ENCOUNTER
----- Message from Loyda Nix sent at 6/23/2023 10:33 AM CDT -----  Who Called: Pt    What is the request in detail: Requesting call back to discuss having 1 pill left, pt need to know if she should continue the following Rx    gabapentin (NEURONTIN) 300 MG capsule 30 capsule 0 6/6/2023 7/6/2023 No  Sig - Route: Take 1 capsule (300 mg total) by mouth every evening. Increase to 3 times per day as needed - Oral  Class: Print  Order: [id number]      Can the clinic reply by [facility]? No    Best Call Back Number: [phone]      Additional Information:

## 2023-07-03 DIAGNOSIS — E78.2 MIXED HYPERLIPIDEMIA: ICD-10-CM

## 2023-07-03 DIAGNOSIS — I10 ESSENTIAL HYPERTENSION: ICD-10-CM

## 2023-07-03 RX ORDER — AMLODIPINE BESYLATE 10 MG/1
10 TABLET ORAL DAILY
Qty: 90 TABLET | Refills: 0 | Status: SHIPPED | OUTPATIENT
Start: 2023-07-03 | End: 2023-07-13 | Stop reason: SDUPTHER

## 2023-07-03 RX ORDER — VALSARTAN 320 MG/1
320 TABLET ORAL DAILY
Qty: 90 TABLET | Refills: 0 | Status: SHIPPED | OUTPATIENT
Start: 2023-07-03 | End: 2023-07-13 | Stop reason: SDUPTHER

## 2023-07-03 RX ORDER — ATORVASTATIN CALCIUM 40 MG/1
40 TABLET, FILM COATED ORAL DAILY
Qty: 90 TABLET | Refills: 0 | Status: SHIPPED | OUTPATIENT
Start: 2023-07-03 | End: 2023-07-13 | Stop reason: SDUPTHER

## 2023-07-03 RX ORDER — HYDROCHLOROTHIAZIDE 25 MG/1
25 TABLET ORAL DAILY
Qty: 90 TABLET | Refills: 0 | Status: SHIPPED | OUTPATIENT
Start: 2023-07-03 | End: 2023-10-02 | Stop reason: SDUPTHER

## 2023-07-03 NOTE — TELEPHONE ENCOUNTER
Care Due:                  Date            Visit Type   Department     Provider  --------------------------------------------------------------------------------                                EP -                              PRIMARY      [facility] INTERNAL  Last Visit: 11-      CARE ([facility])   MEDICINE       Vandana Smith  Next Visit: None Scheduled  None         None Found                                                            Last  Test          Frequency    Reason                     Performed    Due Date  --------------------------------------------------------------------------------    Office Visit  12 months..  amLODIPine, atorvastatin,   11- 11-                             hydroCHLOROthiazide,                             valsartan................    CMP.........  12 months..  atorvastatin,              10-   10-                             hydroCHLOROthiazide,                             valsartan................    Lipid Panel.  12 months..  atorvastatin.............  03- 02-    [facility] Embedded Care Due Messages. Reference number: [id number].   7/03/2023 10:37:52 AM CDT

## 2023-07-03 NOTE — TELEPHONE ENCOUNTER
Refill Routing Note   Medication(s) are not appropriate for processing by [facility] for the following reason(s):      Patient not seen by PCP within 15 months    [facility] action(s):  Defer Care Due:  Appointment due  Labs due     Medication Therapy Plan: LOV with Dr. Smith 11/2021. Last seen by Dr. Fischer 2/23/23 for knee pain. Patient reports being out of medication.        Appointments  past 12m or future 3m with PCP    Date Provider   Last Visit   11/24/2021 Vandana Smith MD   Next Visit   Visit date not found Vandana Smith MD   ED visits in past 90 days: 1        Note composed:3:06 PM 07/03/2023

## 2023-07-03 NOTE — TELEPHONE ENCOUNTER
----- Message from Aliza Tomas sent at 7/3/2023  1:54 PM CDT -----  Regarding: refill  Type:  RX Refill Request    Who Called: Edyta  Refill or New Rx:refill  RX Name and Strength:amLODIPine (NORVASC) 10 MG tablet, valsartan (DIOVAN) 320 MG tablet  How is the patient currently taking it? (ex. 1XDay):  Is this a 30 day or 90 day RX:  Preferred Pharmacy with phone number:[facility]  Local or Mail Order:local  Ordering Provider:  Would the patient rather a call back or a response via [facility]? call  Best Call Back Number:[phone]  Additional Information: Please call patient she is out of medication.         Carac Counseling:  I discussed with the patient the risks of Carac including but not limited to erythema, scaling, itching, weeping, crusting, and pain.

## 2023-07-10 ENCOUNTER — OFFICE VISIT (OUTPATIENT)
Dept: HEMATOLOGY/ONCOLOGY | Facility: CLINIC | Age: 83
End: 2023-07-10
Payer: MEDICARE

## 2023-07-10 VITALS
BODY MASS INDEX: 30.02 KG/M2 | RESPIRATION RATE: 18 BRPM | SYSTOLIC BLOOD PRESSURE: 149 MMHG | HEIGHT: 66 IN | WEIGHT: 186.81 LBS | TEMPERATURE: 98 F | OXYGEN SATURATION: 95 % | DIASTOLIC BLOOD PRESSURE: 73 MMHG | HEART RATE: 75 BPM

## 2023-07-10 DIAGNOSIS — Z79.811 AROMATASE INHIBITOR USE: ICD-10-CM

## 2023-07-10 DIAGNOSIS — G57.01 NEUROPATHY, SCIATIC, RIGHT: ICD-10-CM

## 2023-07-10 DIAGNOSIS — C50.512 MALIGNANT NEOPLASM OF LOWER-OUTER QUADRANT OF LEFT BREAST OF FEMALE, ESTROGEN RECEPTOR POSITIVE: Primary | ICD-10-CM

## 2023-07-10 DIAGNOSIS — Z12.31 ENCOUNTER FOR SCREENING MAMMOGRAM FOR MALIGNANT NEOPLASM OF BREAST: ICD-10-CM

## 2023-07-10 DIAGNOSIS — Z17.0 MALIGNANT NEOPLASM OF LOWER-OUTER QUADRANT OF LEFT BREAST OF FEMALE, ESTROGEN RECEPTOR POSITIVE: Primary | ICD-10-CM

## 2023-07-10 PROCEDURE — 3078F PR MOST RECENT DIASTOLIC BLOOD PRESSURE < 80 MM HG: ICD-10-PCS | Mod: CPTII,S$GLB,, | Performed by: INTERNAL MEDICINE

## 2023-07-10 PROCEDURE — 1101F PR PT FALLS ASSESS DOC 0-1 FALLS W/OUT INJ PAST YR: ICD-10-PCS | Mod: CPTII,S$GLB,, | Performed by: INTERNAL MEDICINE

## 2023-07-10 PROCEDURE — 99999 PR PBB SHADOW E&M-EST. PATIENT-LVL IV: CPT | Mod: PBBFAC,,, | Performed by: INTERNAL MEDICINE

## 2023-07-10 PROCEDURE — 3077F SYST BP >= 140 MM HG: CPT | Mod: CPTII,S$GLB,, | Performed by: INTERNAL MEDICINE

## 2023-07-10 PROCEDURE — 3078F DIAST BP <80 MM HG: CPT | Mod: CPTII,S$GLB,, | Performed by: INTERNAL MEDICINE

## 2023-07-10 PROCEDURE — 1159F MED LIST DOCD IN RCRD: CPT | Mod: CPTII,S$GLB,, | Performed by: INTERNAL MEDICINE

## 2023-07-10 PROCEDURE — 99999 PR PBB SHADOW E&M-EST. PATIENT-LVL IV: ICD-10-PCS | Mod: PBBFAC,,, | Performed by: INTERNAL MEDICINE

## 2023-07-10 PROCEDURE — 1159F PR MEDICATION LIST DOCUMENTED IN MEDICAL RECORD: ICD-10-PCS | Mod: CPTII,S$GLB,, | Performed by: INTERNAL MEDICINE

## 2023-07-10 PROCEDURE — 99215 PR OFFICE/OUTPT VISIT, EST, LEVL V, 40-54 MIN: ICD-10-PCS | Mod: S$GLB,,, | Performed by: INTERNAL MEDICINE

## 2023-07-10 PROCEDURE — 1101F PT FALLS ASSESS-DOCD LE1/YR: CPT | Mod: CPTII,S$GLB,, | Performed by: INTERNAL MEDICINE

## 2023-07-10 PROCEDURE — 3288F FALL RISK ASSESSMENT DOCD: CPT | Mod: CPTII,S$GLB,, | Performed by: INTERNAL MEDICINE

## 2023-07-10 PROCEDURE — 3288F PR FALLS RISK ASSESSMENT DOCUMENTED: ICD-10-PCS | Mod: CPTII,S$GLB,, | Performed by: INTERNAL MEDICINE

## 2023-07-10 PROCEDURE — 1126F AMNT PAIN NOTED NONE PRSNT: CPT | Mod: CPTII,S$GLB,, | Performed by: INTERNAL MEDICINE

## 2023-07-10 PROCEDURE — 1126F PR PAIN SEVERITY QUANTIFIED, NO PAIN PRESENT: ICD-10-PCS | Mod: CPTII,S$GLB,, | Performed by: INTERNAL MEDICINE

## 2023-07-10 PROCEDURE — 3077F PR MOST RECENT SYSTOLIC BLOOD PRESSURE >= 140 MM HG: ICD-10-PCS | Mod: CPTII,S$GLB,, | Performed by: INTERNAL MEDICINE

## 2023-07-10 PROCEDURE — 99215 OFFICE O/P EST HI 40 MIN: CPT | Mod: S$GLB,,, | Performed by: INTERNAL MEDICINE

## 2023-07-10 RX ORDER — ANASTROZOLE 1 MG/1
1 TABLET ORAL DAILY
Qty: 30 TABLET | Refills: 11 | Status: SHIPPED | OUTPATIENT
Start: 2023-07-10 | End: 2024-07-09

## 2023-07-10 RX ORDER — GABAPENTIN 300 MG/1
CAPSULE ORAL
Qty: 90 CAPSULE | Refills: 3 | Status: SHIPPED | OUTPATIENT
Start: 2023-07-10 | End: 2023-07-13 | Stop reason: SDUPTHER

## 2023-07-10 NOTE — PROGRESS NOTES
[facility] Note:      Chief Complaint:   Encounter Diagnoses   Name Primary?    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 female who presents today for follow up of breast cancer on endocrine therapy. She is tolerating anastrazole well without any issues. Patient has opted against surgery and will continue on Anastrazole. She is doing well.     Oncology History  Patient felt a mass in the left breast in October 2021  Diagnostic mammogram 10/25/21 demonstrated bilateral breast masses with recommendation for biopsy  Bilateral breast biopsy 12/20/21 showed a R breast IDC, grade 1, diffuse staining for estrogen and a L breast fibroadenoma   Patient's pathology was initially sent to [place], therefore no receptors were initially run    Patient has a history of HRT which she took for 10 years. She stopped these >10 years ago.         Patient Active Problem List   Diagnosis    Essential hypertension    Cholelithiasis    Refused influenza vaccine    Overweight (BMI 25.0-29.9)    Pre-diabetes    Mixed hyperlipidemia    Poor dentition    Chronic left shoulder pain    Decreased strength of upper extremity    Decreased functional mobility    Impaired range of motion of cervical spine    Malignant neoplasm of lower-outer quadrant of left breast of female, estrogen receptor positive       Current Outpatient Medications   Medication Instructions    amLODIPine  "(NORVASC) 10 mg, Oral, Daily    anastrozole (ARIMIDEX) 1 mg, Oral, Daily    aspirin (ECOTRIN) 81 mg, Oral, Daily    atorvastatin (LIPITOR) 40 mg, Oral, Daily    diclofenac sodium (VOLTAREN) 2 g, Topical (Top), 4 times daily PRN    fish oil-omega-3 fatty acids 300-1,000 mg capsule 1 capsule, Oral, Daily    gabapentin (NEURONTIN) 300 MG capsule Take 1 capsule (300mg) in the morning, and 2 capsules (600mg) at bedtime    hydroCHLOROthiazide (HYDRODIURIL) 25 mg, Oral, Daily    LIDOcaine (LIDODERM) 5 % 1 patch, Transdermal, Daily PRN, Remove & Discard patch within 12 hours or as directed by MD    oxyCODONE-acetaminophen (PERCOCET) 5-325 mg per tablet 1 tablet, Oral, Every 6 hours PRN    valsartan (DIOVAN) 320 mg, Oral, Daily       Review of Systems:   Review of Systems   Constitutional: Negative.    HENT: Negative.     Respiratory: Negative.     Cardiovascular: Negative.    Gastrointestinal: Negative.    Musculoskeletal: Negative.    Neurological: Negative.    Endo/Heme/Allergies: Negative.    All other systems reviewed and are negative.    PHYSICAL EXAM:  BP (!) 149/73   Pulse 75   Temp 97.8 °F (36.6 °C) (Oral)   Resp 18   Ht 5' 5.98" (1.676 m)   Wt 84.8 kg (186 lb 13.4 oz)   SpO2 95%   BMI 30.18 kg/m²     General Appearance:    Alert, cooperative, no distress, appears stated age   Lungs:     Clear to auscultation bilaterally, respirations unlabored    Heart:    Regular rate and rhythm, S1 and S2 normal   Breast Exam:    Deferred    Abdomen:     Soft, non-tender, bowel sounds active, no masses, no organomegaly   Extremities:   Extremities normal, atraumatic, no cyanosis or edema   Pulses:   2+ and symmetric all extremities   Skin:   Skin color, texture, turgor normal, no rashes or lesions   Lymph nodes:   Cervical, supraclavicular, and axillary nodes normal           Pertinent Labs & Imaging:  Diagnostic mammogram 10/25/21:   Impression:  Left  Mass: Left breast mass at the anterior 6 o'clock position. Assessment: " 4 - Suspicious finding. Biopsy is recommended.   Right  Mass: Right breast 6 mm mass at the 10 o'clock position. Assessment: 4 - Suspicious finding. Biopsy is recommended.   BI-RADS Category: Overall: 4 - Suspicious    Bilateral breast ultrasound 12/20/21  1. BREAST, LEFT, 06:00, BIOPSY:   -. Invasive ductal carcinoma, see [place] Consultative Report below.   - Size of invasive carcinoma:  3 MM in greatest linear dimension within a single core biopsy fragment.   - [place] Histologic Score: Grade 1 of 3.                     Tubule Formation:  2                     Nuclear Pleomorphism:  2                     Mitotic Activity:  1   - No definitive in-situ component identified.   - No lymphovascular invasion.   - Microcalcifications:  Not identified.   - Breast biomarkers will be performed upon return of materials from [facility].   2. BREAST, RIGHT, 10:00, BIOPSY:   - Benign breast tissue with stromal fibrosis and fibroadenomatoid nodules.   - Microcalcifications:  Not identified.   - Negative for atypia or malignancy.     [place] FINAL DIAGNOSIS:   Interpretation   Breast, core biopsy (12/20/2021):   1. Left breast, 6:00: Invasive ductal carcinoma with prominent cribriform growth pattern, Aminta grade I (of III).   The submitted immunostains for SMA, p63 and CK 5/6 show absence of myoepithelial cell layer in the tumor. The tumor is diffusely positive for ER. These findings support the above diagnosis.   2. Right breast, 10:00: Fibroadenomatoid nodule.     Ultrasound 10/10/22: R breast 4mm x 5mm x 6mm (decreased from 7 mm x 6 mm x 7 mm in June 2022)    No results found for this or any previous visit (from the past 24 hour(s)).    Assessment & Plan:    1. Malignant neoplasm of lower-outer quadrant of left breast of female, estrogen receptor positive  - gabapentin (NEURONTIN) 300 MG capsule; Take 1 capsule (300mg) in the morning, and 2 capsules (600mg) at bedtime  Dispense: 90 capsule; Refill: 3  - anastrozole  (ARIMIDEX) 1 mg Tab; Take 1 tablet (1 mg total) by mouth once daily.  Dispense: 30 tablet; Refill: 11  - Mammo Digital Screening Bilat; Future  - US Breast Left Limited; Future    2. Aromatase inhibitor use  - gabapentin (NEURONTIN) 300 MG capsule; Take 1 capsule (300mg) in the morning, and 2 capsules (600mg) at bedtime  Dispense: 90 capsule; Refill: 3  - Mammo Digital Screening Bilat; Future  - US Breast Left Limited; Future    3. Encounter for screening mammogram for malignant neoplasm of breast  - Mammo Digital Screening Bilat; Future    4. Neuropathy, sciatic, right      Patient is on with anastrozole, initially as NAET intent, but has deferred surgery Overall she is tolerating this well without major side effects.   Follow up breast imaging on 2/23/23 showed continued OR to therapy. Will plan to repeat in August 2023  Will plan for AI indefinitely.   Will plan for gabapentin 300mg QAM/ 600mg QPM for sciatic pain     We did discuss that one of the possible side effects while on an AI is bone loss or osteoporosis. To help prevent this possible side effect, we advised that she continue taking daily Calcium and Vitamin D supplements. The daily recommended doses are 1000 IU of Vitamin D and 1200mg of Calcium. She can buy these supplements, such as Oscal-D® or Caltrate®, at most local stores. If she has osteopenia or osteoporosis on bone density, we will discuss Prolia in the near future.     Joint pain is a common side effect of an AI. Pain and aching in the bones and joints can range from mild discomfort that goes away by itself, to severe achiness that may require medication. We have suggested using over-the-counter, non-steroidal anti-inflammatory medications like Ibuprofen if she were to experience this side effect.     Patient is agreeable to this plan.       Med Onc Chart Routing      Follow up with physician 4 months.   Follow up with CHRISTINA    Infusion scheduling note    Injection scheduling note    Labs None    Scheduling:  Preferred lab:  Lab interval:     Imaging Mammogram   mammo and ultrasound in August   Pharmacy appointment    Other referrals            MDM includes  :    - Acute or chronic illness or injury that poses a threat to life or bodily function  - Review of prior external notes from unique source (ortho)  - Independent review and explanation of 2 results from unique tests  - Discussion of management and ordering 2 unique tests  - Extensive discussion of treatment and management  - Prescription drug management  - Drug therapy requiring intensive monitoring for toxicity        Aliza Olvera MD  07/10/2023

## 2023-07-13 ENCOUNTER — OFFICE VISIT (OUTPATIENT)
Dept: INTERNAL MEDICINE | Facility: CLINIC | Age: 83
End: 2023-07-13
Payer: MEDICARE

## 2023-07-13 VITALS
WEIGHT: 187.81 LBS | HEART RATE: 73 BPM | BODY MASS INDEX: 31.29 KG/M2 | DIASTOLIC BLOOD PRESSURE: 68 MMHG | HEIGHT: 65 IN | SYSTOLIC BLOOD PRESSURE: 132 MMHG | OXYGEN SATURATION: 95 %

## 2023-07-13 DIAGNOSIS — I10 ESSENTIAL HYPERTENSION: ICD-10-CM

## 2023-07-13 DIAGNOSIS — Z79.811 AROMATASE INHIBITOR USE: ICD-10-CM

## 2023-07-13 DIAGNOSIS — Z17.0 MALIGNANT NEOPLASM OF LOWER-OUTER QUADRANT OF LEFT BREAST OF FEMALE, ESTROGEN RECEPTOR POSITIVE: ICD-10-CM

## 2023-07-13 DIAGNOSIS — M25.561 RIGHT KNEE PAIN, UNSPECIFIED CHRONICITY: ICD-10-CM

## 2023-07-13 DIAGNOSIS — E78.2 MIXED HYPERLIPIDEMIA: Primary | ICD-10-CM

## 2023-07-13 DIAGNOSIS — C50.512 MALIGNANT NEOPLASM OF LOWER-OUTER QUADRANT OF LEFT BREAST OF FEMALE, ESTROGEN RECEPTOR POSITIVE: ICD-10-CM

## 2023-07-13 PROCEDURE — 99999 PR PBB SHADOW E&M-EST. PATIENT-LVL III: CPT | Mod: PBBFAC,,, | Performed by: PHYSICIAN ASSISTANT

## 2023-07-13 PROCEDURE — 1126F PR PAIN SEVERITY QUANTIFIED, NO PAIN PRESENT: ICD-10-PCS | Mod: CPTII,S$GLB,, | Performed by: PHYSICIAN ASSISTANT

## 2023-07-13 PROCEDURE — 99214 OFFICE O/P EST MOD 30 MIN: CPT | Mod: S$GLB,,, | Performed by: PHYSICIAN ASSISTANT

## 2023-07-13 PROCEDURE — 1159F MED LIST DOCD IN RCRD: CPT | Mod: CPTII,S$GLB,, | Performed by: PHYSICIAN ASSISTANT

## 2023-07-13 PROCEDURE — 99214 PR OFFICE/OUTPT VISIT, EST, LEVL IV, 30-39 MIN: ICD-10-PCS | Mod: S$GLB,,, | Performed by: PHYSICIAN ASSISTANT

## 2023-07-13 PROCEDURE — 3075F PR MOST RECENT SYSTOLIC BLOOD PRESS GE 130-139MM HG: ICD-10-PCS | Mod: CPTII,S$GLB,, | Performed by: PHYSICIAN ASSISTANT

## 2023-07-13 PROCEDURE — 3078F DIAST BP <80 MM HG: CPT | Mod: CPTII,S$GLB,, | Performed by: PHYSICIAN ASSISTANT

## 2023-07-13 PROCEDURE — 1126F AMNT PAIN NOTED NONE PRSNT: CPT | Mod: CPTII,S$GLB,, | Performed by: PHYSICIAN ASSISTANT

## 2023-07-13 PROCEDURE — 99999 PR PBB SHADOW E&M-EST. PATIENT-LVL III: ICD-10-PCS | Mod: PBBFAC,,, | Performed by: PHYSICIAN ASSISTANT

## 2023-07-13 PROCEDURE — 1159F PR MEDICATION LIST DOCUMENTED IN MEDICAL RECORD: ICD-10-PCS | Mod: CPTII,S$GLB,, | Performed by: PHYSICIAN ASSISTANT

## 2023-07-13 PROCEDURE — 3075F SYST BP GE 130 - 139MM HG: CPT | Mod: CPTII,S$GLB,, | Performed by: PHYSICIAN ASSISTANT

## 2023-07-13 PROCEDURE — 3078F PR MOST RECENT DIASTOLIC BLOOD PRESSURE < 80 MM HG: ICD-10-PCS | Mod: CPTII,S$GLB,, | Performed by: PHYSICIAN ASSISTANT

## 2023-07-13 RX ORDER — VALSARTAN 320 MG/1
320 TABLET ORAL DAILY
Qty: 90 TABLET | Refills: 3 | Status: SHIPPED | OUTPATIENT
Start: 2023-07-13

## 2023-07-13 RX ORDER — AMLODIPINE BESYLATE 10 MG/1
10 TABLET ORAL DAILY
Qty: 90 TABLET | Refills: 3 | Status: SHIPPED | OUTPATIENT
Start: 2023-07-13

## 2023-07-13 RX ORDER — GABAPENTIN 300 MG/1
CAPSULE ORAL
Qty: 90 CAPSULE | Refills: 3 | Status: CANCELLED | OUTPATIENT
Start: 2023-07-13

## 2023-07-13 RX ORDER — ATORVASTATIN CALCIUM 40 MG/1
40 TABLET, FILM COATED ORAL DAILY
Qty: 90 TABLET | Refills: 3 | Status: SHIPPED | OUTPATIENT
Start: 2023-07-13

## 2023-07-13 RX ORDER — METHOCARBAMOL 500 MG/1
500 TABLET, FILM COATED ORAL NIGHTLY
Qty: 10 TABLET | Refills: 0 | Status: SHIPPED | OUTPATIENT
Start: 2023-07-13 | End: 2023-07-23

## 2023-07-13 RX ORDER — GABAPENTIN 300 MG/1
CAPSULE ORAL
Qty: 90 CAPSULE | Refills: 3 | Status: SHIPPED | OUTPATIENT
Start: 2023-07-13

## 2023-07-13 NOTE — PROGRESS NOTES
"INTERNAL MEDICINE PROGRESS NOTE    CHIEF COMPLAINT     Chief Complaint   Patient presents with    Medication Refill       HPI     Karis Briceño is a 82 y.o. female who presents for a follow-up visit today.    PCP is Vandana Smith MD, patient is known to me.     Patient presents for medication refill. Today in clinic she is feeling "like I am falling apart".     She will need to establish care with a new PCP as her previous is no longer with [facility]. She is scheduled to meet with a new PCP in October of this year.    Seeing BRIAN Medina for right knee pain and sciatica -taking gabapentin, knee is feeling better. Going to PT three times a week.      Needs refills on cholesterol and BP meds.   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 Authorizing Provider   amLODIPine (NORVASC) 10 MG tablet Take 1 tablet (10 mg total) by mouth once daily. 7/3/23  Yes Dajuan Lozoya, DO   anastrozole (ARIMIDEX) 1 mg Tab Take 1 tablet (1 mg total) by mouth once daily. 7/10/23 7/9/24 Yes Aliza Olvera MD   atorvastatin (LIPITOR) 40 MG tablet Take 1 tablet (40 mg total) by mouth once daily. 7/3/23  Yes Dajuan Lozoya, DO   diclofenac sodium (VOLTAREN) 1 % Gel Apply 2 g topically 4 (four) times daily as needed. 12/18/20  Yes Vandana Smith MD   fish oil-omega-3 fatty acids 300-1,000 mg capsule Take 1 capsule by mouth once daily.   Yes Historical Provider   gabapentin (NEURONTIN) 300 MG capsule Take 1 capsule (300mg) in the morning, and 2 capsules (600mg) at bedtime 7/10/23  Yes Aliza Olvera MD   hydroCHLOROthiazide (HYDRODIURIL) 25 MG tablet Take 1 tablet (25 mg total) by mouth once daily. " 7/3/23  Yes Dajuan Lozoya, DO   LIDOcaine (LIDODERM) 5 % Place 1 patch onto the skin daily as needed (Muscle pain to right calf). Remove & Discard patch within 12 hours or as directed by MD 2/19/23  Yes Dez Saravia MD   oxyCODONE-acetaminophen (PERCOCET) 5-325 mg per tablet Take 1 tablet by mouth every 6 (six) hours as needed for Pain. 6/6/23  Yes Arpan Johnson II, MD   valsartan (DIOVAN) 320 MG tablet Take 1 tablet (320 mg total) by mouth once daily. 7/3/23  Yes Dajuan Lozoya DO   aspirin (ECOTRIN) 81 MG EC tablet Take 1 tablet (81 mg total) by mouth once daily.  Patient not taking: Reported on 6/23/2022 3/23/20 10/11/21  Vandana Smith MD       Review of Systems:  Review of Systems   Constitutional:  Negative for chills and fever.   HENT:  Negative for sore throat and trouble swallowing.    Eyes:  Negative for visual disturbance.   Respiratory:  Negative for cough and shortness of breath.    Cardiovascular:  Negative for chest pain.   Gastrointestinal:  Negative for abdominal pain, constipation, diarrhea, nausea and vomiting.   Genitourinary:  Negative for dysuria and flank pain.   Musculoskeletal:  Negative for back pain, neck pain and neck stiffness.        Knee pain (R)   Skin:  Negative for rash.   Neurological:  Negative for dizziness, syncope, weakness and headaches.   Psychiatric/Behavioral:  Negative for confusion.        Health Maintainence:   Immunizations:  Health Maintenance         Date Due Completion Date    Shingles Vaccine (1 of 2) Never done ---    COVID-19 Vaccine (5 - Pfizer series) 03/06/2023 11/6/2022    Influenza Vaccine (1) 09/01/2023 12/1/2020 (Done)    Override on 12/1/2020: Done    Hemoglobin A1c (Prediabetes) 11/08/2023 11/8/2022    TETANUS VACCINE 01/01/2025 1/1/2015 (Done)    Override on 1/1/2015: Done    DEXA Scan 12/20/2025 12/20/2022    Lipid Panel 03/04/2027 3/4/2022             PHYSICAL EXAM     BP (!) 142/72 (BP Location: Left arm, Patient Position: Sitting, BP  "Method: Large (Manual))   Pulse 73   Ht 5' 5" (1.651 m)   Wt 85.2 kg (187 lb 13.3 oz)   SpO2 95%   BMI 31.26 kg/m²     Physical Exam  Vitals and nursing note reviewed.   Constitutional:       Appearance: Normal appearance.      Comments: Elderly female in NAD or apparent pain. She makes good eye contact, speaks in clear full sentences and ambulates with ease.    HENT:      Head: Normocephalic and atraumatic.      Nose: Nose normal.      Mouth/Throat:      Pharynx: Oropharynx is clear.   Eyes:      Conjunctiva/sclera: Conjunctivae normal.   Cardiovascular:      Rate and Rhythm: Normal rate and regular rhythm.      Pulses: Normal pulses.   Pulmonary:      Effort: No respiratory distress.   Musculoskeletal:         General: Normal range of motion.      Cervical back: No rigidity.   Skin:     General: Skin is warm and dry.      Capillary Refill: Capillary refill takes less than 2 seconds.      Findings: No rash.   Neurological:      General: No focal deficit present.      Mental Status: She is alert.      Gait: Gait normal.   Psychiatric:         Mood and Affect: Mood normal.         LABS     Lab Results   Component Value Date    HGBA1C 6.2 (H) 11/08/2022     CMP  Sodium   Date Value Ref Range Status   10/11/2021 142 136 - 145 mmol/L Final     Potassium   Date Value Ref Range Status   10/11/2021 3.8 3.5 - 5.1 mmol/L Final     Chloride   Date Value Ref Range Status   10/11/2021 104 95 - 110 mmol/L Final     CO2   Date Value Ref Range Status   10/11/2021 28 23 - 29 mmol/L Final     Glucose   Date Value Ref Range Status   10/11/2021 124 (H) 70 - 110 mg/dL Final     BUN   Date Value Ref Range Status   10/11/2021 23 8 - 23 mg/dL Final     Creatinine   Date Value Ref Range Status   10/11/2021 0.8 0.5 - 1.4 mg/dL Final     Calcium   Date Value Ref Range Status   10/11/2021 9.8 8.7 - 10.5 mg/dL Final     Total Protein   Date Value Ref Range Status   10/11/2021 7.6 6.0 - 8.4 g/dL Final     Albumin   Date Value Ref Range " Status   10/11/2021 4.0 3.5 - 5.2 g/dL Final     Total Bilirubin   Date Value Ref Range Status   10/11/2021 1.0 0.1 - 1.0 mg/dL Final     Comment:     For infants and newborns, interpretation of results should be based  on gestational age, weight and in agreement with clinical  observations.    Premature Infant recommended reference ranges:  Up to 24 hours.............<8.0 mg/dL  Up to 48 hours............<12.0 mg/dL  3-5 days..................<15.0 mg/dL  6-29 days.................<15.0 mg/dL       Alkaline Phosphatase   Date Value Ref Range Status   10/11/2021 58 55 - 135 U/L Final     AST   Date Value Ref Range Status   10/11/2021 28 10 - 40 U/L Final     ALT   Date Value Ref Range Status   10/11/2021 24 10 - 44 U/L Final     Anion Gap   Date Value Ref Range Status   10/11/2021 10 8 - 16 mmol/L Final     eGFR if    Date Value Ref Range Status   10/11/2021 >60 >60 mL/min/1.73 m^2 Final     eGFR if non    Date Value Ref Range Status   10/11/2021 >60 >60 mL/min/1.73 m^2 Final     Comment:     Calculation used to obtain the estimated glomerular filtration  rate (eGFR) is the CKD-EPI equation.        Lab Results   Component Value Date    WBC 4.93 03/04/2022    HGB 13.5 03/04/2022    HCT 41.3 03/04/2022    MCV 94 03/04/2022     03/04/2022     Lab Results   Component Value Date    CHOL 133 03/04/2022    CHOL 149 01/29/2021    CHOL 220 (H) 09/13/2019     Lab Results   Component Value Date    HDL 60 03/04/2022    HDL 71 01/29/2021    HDL 60 09/13/2019     Lab Results   Component Value Date    LDLCALC 60.4 (L) 03/04/2022    LDLCALC 61.4 (L) 01/29/2021    LDLCALC 150.0 09/13/2019     Lab Results   Component Value Date    TRIG 63 03/04/2022    TRIG 83 01/29/2021    TRIG 50 09/13/2019     Lab Results   Component Value Date    CHOLHDL 45.1 03/04/2022    CHOLHDL 47.7 01/29/2021    CHOLHDL 27.3 09/13/2019     Lab Results   Component Value Date    TSH 1.259 07/20/2020       ASSESSMENT/PLAN      Karis Briceño is a 82 y.o. female     Karis was seen today for medication refill.    Diagnoses and all orders for this visit:    Mixed hyperlipidemia  Comments:  FLP much improved. Elevated ASCVD.  Continue Lipitor 40, aspirin 81. Will work on diet.  Check FLP annually.  Orders:  -     atorvastatin (LIPITOR) 40 MG tablet; Take 1 tablet (40 mg total) by mouth once daily.    Essential hypertension  Comments:  Controlled. Cont Norvasc 10, HCTZ 25, valsartan 320. Encouraged restarting exercise and healthy diet. Discussed ETOH in moderation. Monitor BP at school.  Orders:  -     amLODIPine (NORVASC) 10 MG tablet; Take 1 tablet (10 mg total) by mouth once daily.  -     valsartan (DIOVAN) 320 MG tablet; Take 1 tablet (320 mg total) by mouth once daily.    Right knee pain, unspecified chronicity    Other orders  -     methocarbamoL (ROBAXIN) 500 MG Tab; Take 1 tablet (500 mg total) by mouth every evening. for 10 doses            Lili Brothers PA-C   Department of Internal Medicine - [facility]   8:23 AM

## 2023-07-25 DIAGNOSIS — M51.36 DDD (DEGENERATIVE DISC DISEASE), LUMBAR: Primary | ICD-10-CM

## 2023-08-25 ENCOUNTER — HOSPITAL ENCOUNTER (OUTPATIENT)
Dept: RADIOLOGY | Facility: OTHER | Age: 83
Discharge: HOME | End: 2023-08-25
Attending: INTERNAL MEDICINE
Payer: MEDICARE

## 2023-08-25 DIAGNOSIS — Z79.811 AROMATASE INHIBITOR USE: ICD-10-CM

## 2023-08-25 DIAGNOSIS — C50.512 MALIGNANT NEOPLASM OF LOWER-OUTER QUADRANT OF LEFT BREAST OF FEMALE, ESTROGEN RECEPTOR POSITIVE: ICD-10-CM

## 2023-08-25 DIAGNOSIS — Z12.31 ENCOUNTER FOR SCREENING MAMMOGRAM FOR MALIGNANT NEOPLASM OF BREAST: ICD-10-CM

## 2023-08-25 DIAGNOSIS — Z17.0 MALIGNANT NEOPLASM OF LOWER-OUTER QUADRANT OF LEFT BREAST OF FEMALE, ESTROGEN RECEPTOR POSITIVE: ICD-10-CM

## 2023-08-25 PROCEDURE — 77066 MAMMO DIGITAL DIAGNOSTIC BILAT WITH TOMO: ICD-10-PCS | Mod: 26,,, | Performed by: RADIOLOGY

## 2023-08-25 PROCEDURE — 77066 DX MAMMO INCL CAD BI: CPT | Mod: 26,,, | Performed by: RADIOLOGY

## 2023-08-25 PROCEDURE — 76642 US BREAST LEFT LIMITED: ICD-10-PCS | Mod: 26,LT,, | Performed by: RADIOLOGY

## 2023-08-25 PROCEDURE — 77062 MAMMO DIGITAL DIAGNOSTIC BILAT WITH TOMO: ICD-10-PCS | Mod: 26,,, | Performed by: RADIOLOGY

## 2023-08-25 PROCEDURE — 77062 BREAST TOMOSYNTHESIS BI: CPT | Mod: 26,,, | Performed by: RADIOLOGY

## 2023-08-25 PROCEDURE — 76642 ULTRASOUND BREAST LIMITED: CPT | Mod: 26,LT,, | Performed by: RADIOLOGY

## 2023-08-25 PROCEDURE — 76642 ULTRASOUND BREAST LIMITED: CPT | Mod: TC,LT

## 2023-08-25 PROCEDURE — 77062 BREAST TOMOSYNTHESIS BI: CPT | Mod: TC

## 2023-09-25 ENCOUNTER — TELEPHONE (OUTPATIENT)
Dept: ORTHOPEDICS | Facility: CLINIC | Age: 83
End: 2023-09-25
Payer: MEDICARE

## 2023-09-25 NOTE — TELEPHONE ENCOUNTER
----- Message from Janet Murry PA-C sent at 9/25/2023 10:02 AM CDT -----  Regarding: FW: Reschedule  Contact: [phone]    ----- Message -----  From: Kenton Tripathi  Sent: 9/25/2023  10:00 AM CDT  To: Jeanmarie Gonzales Staff  Subject: Reschedule                                       Pt is calling in ref to rescheduling her 9/25 appt. At 3. She says she has a work meeting and will not be able to make appt. Xray is rescheduled for 9/26 and she would like to reschedule with provider later in the day.Patient Requesting Call Back @ [phone]

## 2023-09-26 ENCOUNTER — HOSPITAL ENCOUNTER (OUTPATIENT)
Dept: RADIOLOGY | Facility: OTHER | Age: 83
Discharge: HOME | End: 2023-09-26
Attending: ORTHOPAEDIC SURGERY
Payer: MEDICARE

## 2023-09-26 DIAGNOSIS — M51.36 DDD (DEGENERATIVE DISC DISEASE), LUMBAR: ICD-10-CM

## 2023-09-26 PROCEDURE — 72110 XR LUMBAR SPINE AP AND LAT WITH FLEX/EXT: ICD-10-PCS | Mod: 26,,, | Performed by: RADIOLOGY

## 2023-09-26 PROCEDURE — 72110 X-RAY EXAM L-2 SPINE 4/>VWS: CPT | Mod: TC,FY

## 2023-09-26 PROCEDURE — 72110 X-RAY EXAM L-2 SPINE 4/>VWS: CPT | Mod: 26,,, | Performed by: RADIOLOGY

## 2023-09-28 ENCOUNTER — OFFICE VISIT (OUTPATIENT)
Dept: ORTHOPEDICS | Facility: CLINIC | Age: 83
End: 2023-09-28
Payer: MEDICARE

## 2023-09-28 VITALS — WEIGHT: 182.19 LBS | BODY MASS INDEX: 28.6 KG/M2 | HEIGHT: 67 IN

## 2023-09-28 DIAGNOSIS — M54.16 LUMBAR RADICULOPATHY, CHRONIC: Primary | ICD-10-CM

## 2023-09-28 PROCEDURE — 1159F MED LIST DOCD IN RCRD: CPT | Mod: CPTII,S$GLB,, | Performed by: ORTHOPAEDIC SURGERY

## 2023-09-28 PROCEDURE — 1159F PR MEDICATION LIST DOCUMENTED IN MEDICAL RECORD: ICD-10-PCS | Mod: CPTII,S$GLB,, | Performed by: ORTHOPAEDIC SURGERY

## 2023-09-28 PROCEDURE — 3288F FALL RISK ASSESSMENT DOCD: CPT | Mod: CPTII,S$GLB,, | Performed by: ORTHOPAEDIC SURGERY

## 2023-09-28 PROCEDURE — 99999 PR PBB SHADOW E&M-EST. PATIENT-LVL III: ICD-10-PCS | Mod: PBBFAC,,, | Performed by: ORTHOPAEDIC SURGERY

## 2023-09-28 PROCEDURE — 99999 PR PBB SHADOW E&M-EST. PATIENT-LVL III: CPT | Mod: PBBFAC,,, | Performed by: ORTHOPAEDIC SURGERY

## 2023-09-28 PROCEDURE — 1125F PR PAIN SEVERITY QUANTIFIED, PAIN PRESENT: ICD-10-PCS | Mod: CPTII,S$GLB,, | Performed by: ORTHOPAEDIC SURGERY

## 2023-09-28 PROCEDURE — 99214 PR OFFICE/OUTPT VISIT, EST, LEVL IV, 30-39 MIN: ICD-10-PCS | Mod: S$GLB,,, | Performed by: ORTHOPAEDIC SURGERY

## 2023-09-28 PROCEDURE — 99214 OFFICE O/P EST MOD 30 MIN: CPT | Mod: S$GLB,,, | Performed by: ORTHOPAEDIC SURGERY

## 2023-09-28 PROCEDURE — 1125F AMNT PAIN NOTED PAIN PRSNT: CPT | Mod: CPTII,S$GLB,, | Performed by: ORTHOPAEDIC SURGERY

## 2023-09-28 PROCEDURE — 3288F PR FALLS RISK ASSESSMENT DOCUMENTED: ICD-10-PCS | Mod: CPTII,S$GLB,, | Performed by: ORTHOPAEDIC SURGERY

## 2023-09-28 PROCEDURE — 1101F PR PT FALLS ASSESS DOC 0-1 FALLS W/OUT INJ PAST YR: ICD-10-PCS | Mod: CPTII,S$GLB,, | Performed by: ORTHOPAEDIC SURGERY

## 2023-09-28 PROCEDURE — 1101F PT FALLS ASSESS-DOCD LE1/YR: CPT | Mod: CPTII,S$GLB,, | Performed by: ORTHOPAEDIC SURGERY

## 2023-09-28 NOTE — PROGRESS NOTES
DATE: 9/28/2023  PATIENT: Karis Briceño    Supervising Physician: Yeison Briseno M.D.    CHIEF COMPLAINT: right leg pain    HISTORY:  Karis Briceño is a 83 y.o. female with breast cx (on oral chemo) here for initial evaluation of low back and right leg pain (Back - 8, Leg - 8).  The pain in the right buttock and behind the right leg is what bothers her most.  The pain has been present for months without injury. The patient describes the pain as shooting.  The pain is worse with walking and improved by leaning on the shopping cart. There is positive associated numbness and tingling. There is mild subjective weakness. Prior treatments have included PT and gabapentin for 8 weeks in the last 6 months, but no ESIs or surgery.    The patient denies myelopathic symptoms such as handwriting changes or difficulty with buttons/coins/keys. Denies perineal paresthesias, bowel/bladder dysfunction.    PAST MEDICAL/SURGICAL HISTORY:  Past Medical History:   Diagnosis Date    Gallstone pancreatitis 09/2016    Gallstones     Hyperlipidemia     Hypertension     Malignant neoplasm of lower-outer quadrant of left breast of female, estrogen receptor positive 1/6/2022     Past Surgical History:   Procedure Laterality Date    HYSTERECTOMY         Medications:   Current Outpatient Medications on File Prior to Visit   Medication Sig Dispense Refill    amLODIPine (NORVASC) 10 MG tablet Take 1 tablet (10 mg total) by mouth once daily. 90 tablet 3    anastrozole (ARIMIDEX) 1 mg Tab Take 1 tablet (1 mg total) by mouth once daily. 30 tablet 11    aspirin (ECOTRIN) 81 MG EC tablet Take 1 tablet (81 mg total) by mouth once daily. (Patient not taking: Reported on 6/23/2022) 90 tablet 3    atorvastatin (LIPITOR) 40 MG tablet Take 1 tablet (40 mg total) by mouth once daily. 90 tablet 3    diclofenac sodium (VOLTAREN) 1 % Gel Apply 2 g topically 4 (four) times daily as needed. 100 g 11    fish oil-omega-3 fatty acids 300-1,000 mg capsule Take 1 capsule  by mouth once daily.      gabapentin (NEURONTIN) 300 MG capsule Take 1 capsule (300mg) in the morning, and 2 capsules (600mg) at bedtime 90 capsule 3    hydroCHLOROthiazide (HYDRODIURIL) 25 MG tablet Take 1 tablet (25 mg total) by mouth once daily. 90 tablet 0    LIDOcaine (LIDODERM) 5 % Place 1 patch onto the skin daily as needed (Muscle pain to right calf). Remove & Discard patch within 12 hours or as directed by MD 5 patch 0    valsartan (DIOVAN) 320 MG tablet Take 1 tablet (320 mg total) by mouth once daily. 90 tablet 3     No current facility-administered medications on file prior to visit.       Social History:   Social History     Socioeconomic History    Marital status: Single   Tobacco Use    Smoking status: Never    Smokeless tobacco: Never   Substance and Sexual Activity    Alcohol use: Yes     Comment: socially    Drug use: No    Sexual activity: Not Currently       REVIEW OF SYSTEMS:  Constitution: Negative. Negative for chills, fever and night sweats.   Cardiovascular: Negative for chest pain and syncope.   Respiratory: Negative for cough and shortness of breath.   Gastrointestinal: See HPI. Negative for nausea/vomiting. Negative for abdominal pain.  Genitourinary: See HPI. Negative for discoloration or dysuria.  Skin: Negative for dry skin, itching and rash.   Hematologic/Lymphatic: Negative for bleeding problem. Does not bruise/bleed easily.   Musculoskeletal: Negative for falls and muscle weakness.   Neurological: See HPI. No seizures.   Endocrine: Negative for polydipsia, polyphagia and polyuria.   Allergic/Immunologic: Negative for hives and persistent infections.     EXAM:  There were no vitals taken for this visit.    General: The patient is a very pleasant 83 y.o. female in no apparent distress, the patient is oriented to person, place and time.  Psych: Normal mood and affect  HEENT: Vision grossly intact, hearing intact to the spoken word.  Lungs: Respirations unlabored.  Gait: Antalgic  station and gait, no difficulty with toe or heel walk.   Skin: Dorsal lumbar skin negative for rashes, lesions, hairy patches and surgical scars. There is negative lumbar tenderness to palpation.  Range of motion: Lumbar range of motion is acceptable.  Spinal Balance: Global saggital and coronal spinal balance acceptable, not significant for scoliosis and kyphosis.  Musculoskeletal: No pain with the range of motion of the bilateral hips. No trochanteric tenderness to palpation.  Vascular: Bilateral lower extremities warm and well perfused, dorsalis pedis pulses 2+ bilaterally.  Neurological: Normal strength and tone in all major motor groups in the bilateral lower extremities. Normal sensation to light touch in the L2-S1 dermatomes bilaterally.  Deep tendon reflexes symmetric 2+ in the bilateral lower extremities.  Negative Babinski bilaterally. Straight leg raise negative bilaterally.    IMAGING:      Today I personally reviewed AP, Lat and Flex/Ex  upright L-spine films that demonstrate moderate degenerative changes without instability.      There is no height or weight on file to calculate BMI.    Hemoglobin A1C   Date Value Ref Range Status   11/08/2022 6.2 (H) 4.0 - 5.6 % Final     Comment:     ADA Screening Guidelines:  5.7-6.4%  Consistent with prediabetes  >or=6.5%  Consistent with diabetes    High levels of fetal hemoglobin interfere with the HbA1C  assay. Heterozygous hemoglobin variants (HbS, HgC, etc)do  not significantly interfere with this assay.   However, presence of multiple variants may affect accuracy.     03/04/2022 6.1 (H) 4.0 - 5.6 % Final     Comment:     ADA Screening Guidelines:  5.7-6.4%  Consistent with prediabetes  >or=6.5%  Consistent with diabetes    High levels of fetal hemoglobin interfere with the HbA1C  assay. Heterozygous hemoglobin variants (HbS, HgC, etc)do  not significantly interfere with this assay.   However, presence of multiple variants may affect accuracy.     10/11/2021  6.2 (H) 4.0 - 5.6 % Final     Comment:     ADA Screening Guidelines:  5.7-6.4%  Consistent with prediabetes  >or=6.5%  Consistent with diabetes    High levels of fetal hemoglobin interfere with the HbA1C  assay. Heterozygous hemoglobin variants (HbS, HgC, etc)do  not significantly interfere with this assay.   However, presence of multiple variants may affect accuracy.             ASSESSMENT/PLAN:    There are no diagnoses linked to this encounter.    Today we discussed at length all of the different treatment options including anti-inflammatories, acetaminophen, rest, ice, heat, physical therapy including strengthening and stretching exercises, home exercises, ROM, aerobic conditioning, aqua therapy, other modalities including ultrasound, massage, and dry needling, epidural steroid injections and finally surgical intervention.      Pt presents with chronic right lumbar radiculopathy. Failure of conservative rx. Will obtain CT lumbar to further evaluate and call with results. Will message oncology about possible ESIs.

## 2023-10-02 DIAGNOSIS — I10 ESSENTIAL HYPERTENSION: ICD-10-CM

## 2023-10-02 RX ORDER — HYDROCHLOROTHIAZIDE 25 MG/1
25 TABLET ORAL DAILY
Qty: 90 TABLET | Refills: 0 | Status: SHIPPED | OUTPATIENT
Start: 2023-10-02 | End: 2023-10-10 | Stop reason: SDUPTHER

## 2023-10-03 ENCOUNTER — HOSPITAL ENCOUNTER (OUTPATIENT)
Dept: RADIOLOGY | Facility: HOSPITAL | Age: 83
Discharge: HOME | End: 2023-10-03
Attending: ORTHOPAEDIC SURGERY
Payer: MEDICARE

## 2023-10-03 DIAGNOSIS — M54.16 LUMBAR RADICULOPATHY, CHRONIC: ICD-10-CM

## 2023-10-03 PROCEDURE — 72131 CT LUMBAR SPINE W/O DYE: CPT | Mod: TC

## 2023-10-03 PROCEDURE — 72131 CT LUMBAR SPINE W/O DYE: CPT | Mod: 26,,, | Performed by: RADIOLOGY

## 2023-10-03 PROCEDURE — 72131 CT LUMBAR SPINE WITHOUT CONTRAST: ICD-10-PCS | Mod: 26,,, | Performed by: RADIOLOGY

## 2023-10-05 ENCOUNTER — OFFICE VISIT (OUTPATIENT)
Dept: ORTHOPEDICS | Facility: CLINIC | Age: 83
End: 2023-10-05
Payer: MEDICARE

## 2023-10-05 DIAGNOSIS — N28.89 LEFT KIDNEY MASS: Primary | ICD-10-CM

## 2023-10-05 DIAGNOSIS — M54.16 LUMBAR RADICULOPATHY, CHRONIC: ICD-10-CM

## 2023-10-05 PROCEDURE — 99499 UNLISTED E&M SERVICE: CPT | Mod: 95,,, | Performed by: ORTHOPAEDIC SURGERY

## 2023-10-05 PROCEDURE — 99499 NO LOS: ICD-10-PCS | Mod: 95,,, | Performed by: ORTHOPAEDIC SURGERY

## 2023-10-05 NOTE — PROGRESS NOTES
Established Patient - Audio Only Telehealth Visit     The patient location is: home  The chief complaint leading to consultation is: MRI results  Visit type: Virtual visit with audio only (telephone)  Total time spent with patient: 10 min       The reason for the audio only service rather than synchronous audio and video virtual visit was related to technical difficulties or patient preference/necessity.     Each patient to whom I provide medical services by telemedicine is:  (1) informed of the relationship between the physician and patient and the respective role of any other health care provider with respect to management of the patient; and (2) notified that they may decline to receive medical services by telemedicine and may withdraw from such care at any time. Patient verbally consented to receive this service via voice-only telephone call.    DATE: 10/5/2023  PATIENT: Karis Briceño    Attending Physician: Yeison Briseno MD    HISTORY:  Karis Briceño is a 83 y.o. female who returns to me today for follow up.  She was last seen by me 9/28/2023.  Today she is doing well but notes she continues to have right leg pain. Prior treatments have included PT and gabapentin for 8 weeks in the last 6 months, but no ESIs or surgery.    The Patient denies myelopathic symptoms such as handwriting changes or difficulty with buttons/coins/keys. Denies perineal paresthesias, bowel/bladder dysfunction.    PMH/PSH/FamHx/SocHx:  Unchanged from prior visit    ROS:  REVIEW OF SYSTEMS:  Constitution: Negative. Negative for chills, fever and night sweats.   HENT: Negative for congestion and headaches.    Eyes: Negative for blurred vision, left vision loss and right vision loss.   Cardiovascular: Negative for chest pain and syncope.   Respiratory: Negative for cough and shortness of breath.    Endocrine: Negative for polydipsia, polyphagia and polyuria.   Hematologic/Lymphatic: Negative for bleeding problem. Does not bruise/bleed  easily.   Skin: Negative for dry skin, itching and rash.   Musculoskeletal: Negative for falls and muscle weakness.   Gastrointestinal: Negative for abdominal pain and bowel incontinence.   Allergic/Immunologic: Negative for hives and persistent infections.  Genitourinary: Negative for urinary retention/incontinence and nocturia.   Neurological: negative for disturbances in coordination, no myelopathic symptoms such as handwriting changes or difficulty with buttons, coins, keys or small objects. No loss of balance and seizures.   Psychiatric/Behavioral: Negative for depression. The patient does not have insomnia.   Denies perineal paresthesias, bowel or bladder incontinence    EXAM:  There were no vitals taken for this visit.    My physical examination was notable for the following findings:     Musculoskeletal and neuro exam stable      IMAGING:    Today I personally re- reviewed AP, Lat and Flex/Ex  upright L-spine that demonstrate moderate degenerative changes without instability.     CT lumbar demonstrates advanced lumbar spondylosis most severe from L2-L3 through L5-S1.  0.9 cm left renal cortical lesion that does not meet CT criteria for a simple cyst.  Recommend further characterization with a dedicated renal ultrasound.       There is no height or weight on file to calculate BMI.    Hemoglobin A1C   Date Value Ref Range Status   11/08/2022 6.2 (H) 4.0 - 5.6 % Final     Comment:     ADA Screening Guidelines:  5.7-6.4%  Consistent with prediabetes  >or=6.5%  Consistent with diabetes    High levels of fetal hemoglobin interfere with the HbA1C  assay. Heterozygous hemoglobin variants (HbS, HgC, etc)do  not significantly interfere with this assay.   However, presence of multiple variants may affect accuracy.     03/04/2022 6.1 (H) 4.0 - 5.6 % Final     Comment:     ADA Screening Guidelines:  5.7-6.4%  Consistent with prediabetes  >or=6.5%  Consistent with diabetes    High levels of fetal hemoglobin interfere with  the HbA1C  assay. Heterozygous hemoglobin variants (HbS, HgC, etc)do  not significantly interfere with this assay.   However, presence of multiple variants may affect accuracy.     10/11/2021 6.2 (H) 4.0 - 5.6 % Final     Comment:     ADA Screening Guidelines:  5.7-6.4%  Consistent with prediabetes  >or=6.5%  Consistent with diabetes    High levels of fetal hemoglobin interfere with the HbA1C  assay. Heterozygous hemoglobin variants (HbS, HgC, etc)do  not significantly interfere with this assay.   However, presence of multiple variants may affect accuracy.           ASSESSMENT/PLAN:    There are no diagnoses linked to this encounter.    Today we discussed at length all of the different treatment options including anti-inflammatories, acetaminophen, rest, ice, heat, physical therapy including strengthening and stretching exercises, home exercises, ROM, aerobic conditioning, aqua therapy, other modalities including ultrasound, massage, and dry needling, epidural steroid injections and finally surgical intervention.      Pt presents with chronic right lumbar radiculopathy. Failure of conservative rx. Will order right L4-5 L5-S1 TFESI with pain management. Pt will fu if pain persists. Will order US to further evaluate left kidney lesion.                       This service was not originating from a related E/M service provided within the previous 7 days nor will  to an E/M service or procedure within the next 24 hours or my soonest available appointment.  Prevailing standard of care was able to be met in this audio-only visit.

## 2023-10-06 ENCOUNTER — PATIENT MESSAGE (OUTPATIENT)
Dept: PAIN MEDICINE | Facility: OTHER | Age: 83
End: 2023-10-06
Payer: MEDICARE

## 2023-10-06 DIAGNOSIS — M54.16 LUMBAR RADICULOPATHY, CHRONIC: Primary | ICD-10-CM

## 2023-10-10 ENCOUNTER — OFFICE VISIT (OUTPATIENT)
Dept: INTERNAL MEDICINE | Facility: CLINIC | Age: 83
End: 2023-10-10
Payer: MEDICARE

## 2023-10-10 ENCOUNTER — HOSPITAL ENCOUNTER (OUTPATIENT)
Dept: RADIOLOGY | Facility: OTHER | Age: 83
Discharge: HOME | End: 2023-10-10
Attending: ORTHOPAEDIC SURGERY
Payer: MEDICARE

## 2023-10-10 VITALS
WEIGHT: 183.88 LBS | SYSTOLIC BLOOD PRESSURE: 146 MMHG | DIASTOLIC BLOOD PRESSURE: 77 MMHG | OXYGEN SATURATION: 99 % | HEART RATE: 78 BPM | BODY MASS INDEX: 29.23 KG/M2

## 2023-10-10 DIAGNOSIS — C50.512 MALIGNANT NEOPLASM OF LOWER-OUTER QUADRANT OF LEFT BREAST OF FEMALE, ESTROGEN RECEPTOR POSITIVE: ICD-10-CM

## 2023-10-10 DIAGNOSIS — E78.2 MIXED HYPERLIPIDEMIA: ICD-10-CM

## 2023-10-10 DIAGNOSIS — M54.41 CHRONIC RIGHT-SIDED LOW BACK PAIN WITH RIGHT-SIDED SCIATICA: ICD-10-CM

## 2023-10-10 DIAGNOSIS — I10 ESSENTIAL HYPERTENSION: ICD-10-CM

## 2023-10-10 DIAGNOSIS — Z23 NEED FOR VACCINATION: ICD-10-CM

## 2023-10-10 DIAGNOSIS — E55.9 VITAMIN D DEFICIENCY: ICD-10-CM

## 2023-10-10 DIAGNOSIS — R73.03 PREDIABETES: ICD-10-CM

## 2023-10-10 DIAGNOSIS — G89.29 CHRONIC RIGHT-SIDED LOW BACK PAIN WITH RIGHT-SIDED SCIATICA: ICD-10-CM

## 2023-10-10 DIAGNOSIS — Z00.00 HEALTH MAINTENANCE EXAMINATION: Primary | ICD-10-CM

## 2023-10-10 DIAGNOSIS — E53.8 VITAMIN B12 DEFICIENCY: ICD-10-CM

## 2023-10-10 DIAGNOSIS — Z17.0 MALIGNANT NEOPLASM OF LOWER-OUTER QUADRANT OF LEFT BREAST OF FEMALE, ESTROGEN RECEPTOR POSITIVE: ICD-10-CM

## 2023-10-10 DIAGNOSIS — N28.89 LEFT KIDNEY MASS: ICD-10-CM

## 2023-10-10 PROCEDURE — 99999 PR PBB SHADOW E&M-EST. PATIENT-LVL IV: ICD-10-PCS | Mod: PBBFAC,,, | Performed by: STUDENT IN AN ORGANIZED HEALTH CARE EDUCATION/TRAINING PROGRAM

## 2023-10-10 PROCEDURE — 3078F DIAST BP <80 MM HG: CPT | Mod: CPTII,S$GLB,, | Performed by: STUDENT IN AN ORGANIZED HEALTH CARE EDUCATION/TRAINING PROGRAM

## 2023-10-10 PROCEDURE — 1101F PR PT FALLS ASSESS DOC 0-1 FALLS W/OUT INJ PAST YR: ICD-10-PCS | Mod: CPTII,S$GLB,, | Performed by: STUDENT IN AN ORGANIZED HEALTH CARE EDUCATION/TRAINING PROGRAM

## 2023-10-10 PROCEDURE — 99999 PR PBB SHADOW E&M-EST. PATIENT-LVL IV: CPT | Mod: PBBFAC,,, | Performed by: STUDENT IN AN ORGANIZED HEALTH CARE EDUCATION/TRAINING PROGRAM

## 2023-10-10 PROCEDURE — 1101F PT FALLS ASSESS-DOCD LE1/YR: CPT | Mod: CPTII,S$GLB,, | Performed by: STUDENT IN AN ORGANIZED HEALTH CARE EDUCATION/TRAINING PROGRAM

## 2023-10-10 PROCEDURE — 3077F SYST BP >= 140 MM HG: CPT | Mod: CPTII,S$GLB,, | Performed by: STUDENT IN AN ORGANIZED HEALTH CARE EDUCATION/TRAINING PROGRAM

## 2023-10-10 PROCEDURE — 76770 US EXAM ABDO BACK WALL COMP: CPT | Mod: 26,,, | Performed by: STUDENT IN AN ORGANIZED HEALTH CARE EDUCATION/TRAINING PROGRAM

## 2023-10-10 PROCEDURE — 76770 US EXAM ABDO BACK WALL COMP: CPT | Mod: TC

## 2023-10-10 PROCEDURE — 3288F PR FALLS RISK ASSESSMENT DOCUMENTED: ICD-10-PCS | Mod: CPTII,S$GLB,, | Performed by: STUDENT IN AN ORGANIZED HEALTH CARE EDUCATION/TRAINING PROGRAM

## 2023-10-10 PROCEDURE — 3077F PR MOST RECENT SYSTOLIC BLOOD PRESSURE >= 140 MM HG: ICD-10-PCS | Mod: CPTII,S$GLB,, | Performed by: STUDENT IN AN ORGANIZED HEALTH CARE EDUCATION/TRAINING PROGRAM

## 2023-10-10 PROCEDURE — 99215 PR OFFICE/OUTPT VISIT, EST, LEVL V, 40-54 MIN: ICD-10-PCS | Mod: S$GLB,,, | Performed by: STUDENT IN AN ORGANIZED HEALTH CARE EDUCATION/TRAINING PROGRAM

## 2023-10-10 PROCEDURE — 3288F FALL RISK ASSESSMENT DOCD: CPT | Mod: CPTII,S$GLB,, | Performed by: STUDENT IN AN ORGANIZED HEALTH CARE EDUCATION/TRAINING PROGRAM

## 2023-10-10 PROCEDURE — 99215 OFFICE O/P EST HI 40 MIN: CPT | Mod: S$GLB,,, | Performed by: STUDENT IN AN ORGANIZED HEALTH CARE EDUCATION/TRAINING PROGRAM

## 2023-10-10 PROCEDURE — 76770 US KIDNEY: ICD-10-PCS | Mod: 26,,, | Performed by: STUDENT IN AN ORGANIZED HEALTH CARE EDUCATION/TRAINING PROGRAM

## 2023-10-10 PROCEDURE — 3078F PR MOST RECENT DIASTOLIC BLOOD PRESSURE < 80 MM HG: ICD-10-PCS | Mod: CPTII,S$GLB,, | Performed by: STUDENT IN AN ORGANIZED HEALTH CARE EDUCATION/TRAINING PROGRAM

## 2023-10-10 RX ORDER — ANASTROZOLE 1 MG/1
1 TABLET ORAL DAILY
Qty: 30 TABLET | Refills: 11 | Status: CANCELLED | OUTPATIENT
Start: 2023-10-10 | End: 2024-10-09

## 2023-10-10 RX ORDER — HYDROCHLOROTHIAZIDE 25 MG/1
25 TABLET ORAL DAILY
Qty: 90 TABLET | Refills: 0 | Status: SHIPPED | OUTPATIENT
Start: 2023-10-10 | End: 2024-01-02 | Stop reason: SDUPTHER

## 2023-10-10 NOTE — PROGRESS NOTES
"Subjective:       Patient ID: Karis Briceño is a 83 y.o. female.    Chief Complaint: Health maintenance examination [Z00.00]    Patient is new to me, here to establish care.    Health maintenance -   Never previously had colorectal cancer screening, declines screening.  Denies family history of colorectal cancer.  Denies family history of breast cancer.  Denies family history of ovarian cancer.  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   Completed DEXA in DEC2022.  Showed normal bone density.  Family history of cardiac disease.  UTD on Tdap, COVID primary/bivalent, PPSV23, PCV13 vaccinations.  Due for influenza, shingles, COVID, vaccinations.  Never smoker.  Drinks alcohol 2-3 times weekly, 2 glasses of Amber per sitting.  Denies drug use.  Due for HIV and hepatitis C screening.          HTN -   Currently prescribed amlodipine, valsartan, HCTZ.  Patient endorses taking medication as directed.  Denies side effects or concerns while taking medication.  Due for micro albumin creatinine ratio.   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 Olvera routinely for oncology.  Mammogram BI-RADS 6 in AUG2023.    Following with J[facility] Murry for orthopedics  Had CT scan OCT2023 showed "Advanced lumbar spondylosis most severe from L2-L3 through L5-S1 as detailed above."  Tried gabapentin, but made her too sleepy  Planned for PRASAD, unsure if she would like to do it    HLD -   Endorses taking atorvastatin as directed  Denies side effects or concerns while taking medication  Lab Results       Component                Value               Date                       CHOL    "                  133                 03/04/2022            Lab Results       Component                Value               Date                       TRIG                     63                  03/04/2022            Lab Results       Component                Value               Date                       LDLCALC                  60.4 (L)            03/04/2022            Lab Results       Component                Value               Date                       HDL                      60                  03/04/2022            Due for lipid recheck.    Prediabetes -   Due for diabetes screening.  Lab Results       Component                Value               Date                       HGBA1C                   6.2 (H)             11/08/2022                 HGBA1C                   6.1 (H)             03/04/2022                 HGBA1C                   6.2 (H)             10/11/2021              Vitamin D deficiency -  Currently taking vitamin D3 supplementation daily.       Review of Systems   Constitutional:  Negative for activity change, fatigue and fever.   Respiratory:  Negative for cough and shortness of breath.    Cardiovascular:  Negative for chest pain and palpitations.   Gastrointestinal:  Negative for abdominal pain, constipation, diarrhea, nausea and vomiting.   Musculoskeletal:  Positive for back pain and myalgias.   Neurological:  Negative for syncope and headaches.         Current Outpatient Medications   Medication Instructions    amLODIPine (NORVASC) 10 mg, Oral, Daily    anastrozole (ARIMIDEX) 1 mg, Oral, Daily    aspirin (ECOTRIN) 81 mg, Oral, Daily    atorvastatin (LIPITOR) 40 mg, Oral, Daily    diclofenac sodium (VOLTAREN) 2 g, Topical (Top), 4 times daily PRN    fish oil-omega-3 fatty acids 300-1,000 mg capsule 1 capsule, Oral, Daily    gabapentin (NEURONTIN) 300 MG capsule Take 1 capsule (300mg) in the morning, and 2 capsules (600mg) at bedtime    hydroCHLOROthiazide (HYDRODIURIL) 25 mg, Oral,  Daily    LIDOcaine (LIDODERM) 5 % 1 patch, Transdermal, Daily PRN, Remove & Discard patch within 12 hours or as directed by MD freemanrtan (DIOVAN) 320 mg, Oral, Daily     Objective:      Vitals:    10/10/23 1033   BP: (!) 146/77   Pulse: 78   SpO2: 99%   Weight: 83.4 kg (183 lb 13.8 oz)     Body mass index is 29.23 kg/m².    Physical Exam  Vitals reviewed.   Constitutional:       General: She is not in acute distress.     Appearance: Normal appearance. She is not ill-appearing or diaphoretic.   HENT:      Head: Normocephalic and atraumatic.      Right Ear: Tympanic membrane, ear canal and external ear normal. There is no impacted cerumen.      Left Ear: Tympanic membrane, ear canal and external ear normal. There is no impacted cerumen.      Nose: Nose normal. No rhinorrhea.      Mouth/Throat:      Mouth: Mucous membranes are moist.      Pharynx: Oropharynx is clear. No oropharyngeal exudate or posterior oropharyngeal erythema.   Eyes:      General: No scleral icterus.        Right eye: No discharge.         Left eye: No discharge.      Conjunctiva/sclera: Conjunctivae normal.   Neck:      Thyroid: No thyromegaly or thyroid tenderness.      Trachea: Trachea normal.   Cardiovascular:      Rate and Rhythm: Normal rate and regular rhythm.      Heart sounds: Normal heart sounds. No murmur heard.     No friction rub. No gallop.   Pulmonary:      Effort: Pulmonary effort is normal. No respiratory distress.      Breath sounds: Normal breath sounds. No stridor. No wheezing, rhonchi or rales.   Abdominal:      General: There is no distension.      Palpations: Abdomen is soft.      Tenderness: There is no abdominal tenderness. There is no guarding or rebound.   Musculoskeletal:         General: No swelling or deformity.      Cervical back: Neck supple.   Lymphadenopathy:      Head:      Right side of head: No submandibular or posterior auricular adenopathy.      Left side of head: No submandibular or posterior auricular  adenopathy.      Cervical: No cervical adenopathy.      Right cervical: No superficial, deep or posterior cervical adenopathy.     Left cervical: No superficial, deep or posterior cervical adenopathy.      Upper Body:      Right upper body: No supraclavicular adenopathy.      Left upper body: No supraclavicular adenopathy.   Skin:     General: Skin is warm and dry.   Neurological:      General: No focal deficit present.      Mental Status: She is alert. Mental status is at baseline.      Gait: Gait normal.   Psychiatric:         Mood and Affect: Mood normal.         Behavior: Behavior normal.         Assessment:       1. Health maintenance examination    2. Essential hypertension    3. Malignant neoplasm of lower-outer quadrant of left breast of female, estrogen receptor positive    4. Chronic right-sided low back pain with right-sided sciatica    5. Mixed hyperlipidemia    6. Prediabetes    7. Vitamin D deficiency    8. Vitamin B12 deficiency    9. Need for vaccination        Plan:       Essential hypertension  Continue current medications.  RTC in 6 months for follow up.  -     hydroCHLOROthiazide (HYDRODIURIL) 25 MG tablet; Take 1 tablet (25 mg total) by mouth once daily.  -     TSH; Future  -     Microalbumin/Creatinine Ratio, Urine; Future    Malignant neoplasm of lower-outer quadrant of left breast of female, estrogen receptor positive  Continue medication, evaluation, and management per oncologist.  -     Comprehensive Metabolic Panel; Future  -     CBC Auto Differential; Future    Chronic right-sided low back pain with right-sided sciatica  Continue medication, evaluation, and management per orthopedist.  -     Ambulatory referral/consult to Physical/Occupational Therapy; Future    Mixed hyperlipidemia  Continue current medications.  RTC in 6 months for follow up.  -     Lipid Panel; Future    Prediabetes  RTC in 6 months for follow up.  -     Hemoglobin A1C; Future    Vitamin D deficiency  Continue  supplementation.  RTC in 6 months for follow up.  -     Vitamin D; Future    Vitamin B12 deficiency  -     Vitamin B12; Future    Health maintenance examination  Reviewed and discussed age appropriate screenings and immunizations.  -     Comprehensive Metabolic Panel; Future  -     TSH; Future  -     Lipid Panel; Future  -     Hemoglobin A1C; Future  -     CBC Auto Differential; Future  -     Vitamin D; Future  -     Microalbumin/Creatinine Ratio, Urine; Future  -     Vitamin B12; Future    Need for vaccination  Discussed recommended vaccinations and how to obtain.      52 minutes were spent in chart review, documentation and review of results, and evaluation, treatment, and counseling of patient on the same day of service.    Oly Roque MD  10/10/2023

## 2023-11-02 ENCOUNTER — TELEPHONE (OUTPATIENT)
Dept: HEMATOLOGY/ONCOLOGY | Facility: CLINIC | Age: 83
End: 2023-11-02
Payer: MEDICARE

## 2023-11-02 NOTE — TELEPHONE ENCOUNTER
----- Message from Zulma Zapien RN sent at 11/2/2023  1:39 PM CDT -----  Regarding: FW: Appt  Contact: Pt [phone]    ----- Message -----  From: Araceli Rodriguez  Sent: 11/2/2023   1:39 PM CDT  To: [facility] Staff  Subject: Appt                                             Pt is calling to see if appt can be rescheduled for 11/21 please call

## 2023-11-06 ENCOUNTER — PATIENT MESSAGE (OUTPATIENT)
Dept: ADMINISTRATIVE | Facility: HOSPITAL | Age: 83
End: 2023-11-06
Payer: MEDICARE

## 2023-11-17 ENCOUNTER — TELEPHONE (OUTPATIENT)
Dept: HEMATOLOGY/ONCOLOGY | Facility: CLINIC | Age: 83
End: 2023-11-17
Payer: MEDICARE

## 2023-11-17 NOTE — TELEPHONE ENCOUNTER
----- Message from Radha Goss sent at 11/17/2023 11:07 AM CST -----  Regarding: Reschedule Appointment  Contact: [phone]  Calling in regards to rescheduling appointment as soon as possible for 3pm or 3:30pm . Please call and reschedule.

## 2023-11-30 ENCOUNTER — PATIENT OUTREACH (OUTPATIENT)
Dept: ADMINISTRATIVE | Facility: HOSPITAL | Age: 83
End: 2023-11-30
Payer: MEDICARE

## 2023-11-30 ENCOUNTER — PATIENT MESSAGE (OUTPATIENT)
Dept: ADMINISTRATIVE | Facility: HOSPITAL | Age: 83
End: 2023-11-30
Payer: MEDICARE

## 2023-12-26 ENCOUNTER — OFFICE VISIT (OUTPATIENT)
Dept: HEMATOLOGY/ONCOLOGY | Facility: CLINIC | Age: 83
End: 2023-12-26
Payer: MEDICARE

## 2023-12-26 VITALS
HEART RATE: 89 BPM | BODY MASS INDEX: 29.72 KG/M2 | OXYGEN SATURATION: 98 % | HEIGHT: 66 IN | WEIGHT: 184.94 LBS | DIASTOLIC BLOOD PRESSURE: 74 MMHG | SYSTOLIC BLOOD PRESSURE: 143 MMHG | RESPIRATION RATE: 18 BRPM | TEMPERATURE: 98 F

## 2023-12-26 DIAGNOSIS — Z17.0 MALIGNANT NEOPLASM OF LOWER-OUTER QUADRANT OF LEFT BREAST OF FEMALE, ESTROGEN RECEPTOR POSITIVE: Primary | ICD-10-CM

## 2023-12-26 DIAGNOSIS — Z12.31 ENCOUNTER FOR SCREENING MAMMOGRAM FOR MALIGNANT NEOPLASM OF BREAST: ICD-10-CM

## 2023-12-26 DIAGNOSIS — C50.512 MALIGNANT NEOPLASM OF LOWER-OUTER QUADRANT OF LEFT BREAST OF FEMALE, ESTROGEN RECEPTOR POSITIVE: Primary | ICD-10-CM

## 2023-12-26 DIAGNOSIS — Z79.811 AROMATASE INHIBITOR USE: ICD-10-CM

## 2023-12-26 PROCEDURE — 1159F MED LIST DOCD IN RCRD: CPT | Mod: CPTII,S$GLB,, | Performed by: INTERNAL MEDICINE

## 2023-12-26 PROCEDURE — 1101F PT FALLS ASSESS-DOCD LE1/YR: CPT | Mod: CPTII,S$GLB,, | Performed by: INTERNAL MEDICINE

## 2023-12-26 PROCEDURE — 99215 OFFICE O/P EST HI 40 MIN: CPT | Mod: S$GLB,,, | Performed by: INTERNAL MEDICINE

## 2023-12-26 PROCEDURE — 3288F FALL RISK ASSESSMENT DOCD: CPT | Mod: CPTII,S$GLB,, | Performed by: INTERNAL MEDICINE

## 2023-12-26 PROCEDURE — 3078F DIAST BP <80 MM HG: CPT | Mod: CPTII,S$GLB,, | Performed by: INTERNAL MEDICINE

## 2023-12-26 PROCEDURE — 99999 PR PBB SHADOW E&M-EST. PATIENT-LVL III: CPT | Mod: PBBFAC,,, | Performed by: INTERNAL MEDICINE

## 2023-12-26 PROCEDURE — 3077F SYST BP >= 140 MM HG: CPT | Mod: CPTII,S$GLB,, | Performed by: INTERNAL MEDICINE

## 2023-12-26 PROCEDURE — 1126F AMNT PAIN NOTED NONE PRSNT: CPT | Mod: CPTII,S$GLB,, | Performed by: INTERNAL MEDICINE

## 2023-12-26 NOTE — PROGRESS NOTES
[facility] Note:      Chief Complaint:   Encounter Diagnoses   Name Primary?    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 female who presents today for follow up of breast cancer on endocrine therapy. She is tolerating anastrazole well without any issues. Patient has opted against surgery and will continue on Anastrazole. She is doing well but has noticed increased asymmetry in the breasts. Feels that the L breast is larger than the R. No new pains. No symptomatic concerns    Oncology History  Patient felt a mass in the left breast in October 2021  Diagnostic mammogram 10/25/21 demonstrated bilateral breast masses with recommendation for biopsy  Bilateral breast biopsy 12/20/21 showed a R breast IDC, grade 1, diffuse staining for estrogen and a L breast fibroadenoma   Patient's pathology was initially sent to [facility], therefore no receptors were initially run    Patient has a history of HRT which she took for 10 years. She stopped these >10 years ago.         Patient Active Problem List   Diagnosis    Essential hypertension    Cholelithiasis    Refused influenza vaccine    Overweight (BMI 25.0-29.9)    Pre-diabetes    Mixed hyperlipidemia    Poor dentition    Chronic left shoulder pain    Decreased strength of upper extremity    Decreased functional mobility    Impaired range of motion of cervical spine    Malignant neoplasm of lower-outer quadrant of left breast of female,  "estrogen receptor positive       Current Outpatient Medications   Medication Instructions    amLODIPine (NORVASC) 10 mg, Oral, Daily    anastrozole (ARIMIDEX) 1 mg, Oral, Daily    aspirin (ECOTRIN) 81 mg, Oral, Daily    atorvastatin (LIPITOR) 40 mg, Oral, Daily    diclofenac sodium (VOLTAREN) 2 g, Topical (Top), 4 times daily PRN    fish oil-omega-3 fatty acids 300-1,000 mg capsule 1 capsule, Oral, Daily    gabapentin (NEURONTIN) 300 MG capsule Take 1 capsule (300mg) in the morning, and 2 capsules (600mg) at bedtime    hydroCHLOROthiazide (HYDRODIURIL) 25 mg, Oral, Daily    LIDOcaine (LIDODERM) 5 % 1 patch, Transdermal, Daily PRN, Remove & Discard patch within 12 hours or as directed by MD    valsartan (DIOVAN) 320 mg, Oral, Daily       Review of Systems:   Review of Systems   Constitutional: Negative.    HENT: Negative.     Respiratory: Negative.     Cardiovascular: Negative.    Gastrointestinal: Negative.    Musculoskeletal: Negative.    Neurological: Negative.    Endo/Heme/Allergies: Negative.    All other systems reviewed and are negative.      PHYSICAL EXAM:  BP (!) 143/74   Pulse 89   Temp 98 °F (36.7 °C) (Oral)   Resp 18   Ht 5' 6.5" (1.689 m)   Wt 83.9 kg (184 lb 15.5 oz)   SpO2 98%   BMI 29.41 kg/m²     General Appearance:    Alert, cooperative, no distress, appears stated age   Lungs:     Clear to auscultation bilaterally, respirations unlabored    Heart:    Regular rate and rhythm, S1 and S2 normal   Breast Exam:    No palpable abnormalities, L breast > R breast     Abdomen:     Soft, non-tender, bowel sounds active, no masses, no organomegaly   Extremities:   Extremities normal, atraumatic, no cyanosis or edema   Pulses:   2+ and symmetric all extremities   Skin:   Skin color, texture, turgor normal, no rashes or lesions   Lymph nodes:   Cervical, supraclavicular, and axillary nodes normal           Pertinent Labs & Imaging:  Diagnostic mammogram 10/25/21:   Impression:  Left  Mass: Left breast " mass at the anterior 6 o'clock position. Assessment: 4 - Suspicious finding. Biopsy is recommended.   Right  Mass: Right breast 6 mm mass at the 10 o'clock position. Assessment: 4 - Suspicious finding. Biopsy is recommended.   BI-RADS Category: Overall: 4 - Suspicious    Bilateral breast ultrasound 12/20/21  1. BREAST, LEFT, 06:00, BIOPSY:   -. Invasive ductal carcinoma, see [place] Consultative Report below.   - Size of invasive carcinoma:  3 MM in greatest linear dimension within a single core biopsy fragment.   - Aminta Histologic Score: Grade 1 of 3.                     Tubule Formation:  2                     Nuclear Pleomorphism:  2                     Mitotic Activity:  1   - No definitive in-situ component identified.   - No lymphovascular invasion.   - Microcalcifications:  Not identified.   - Breast biomarkers will be performed upon return of materials from [facility].   2. BREAST, RIGHT, 10:00, BIOPSY:   - Benign breast tissue with stromal fibrosis and fibroadenomatoid nodules.   - Microcalcifications:  Not identified.   - Negative for atypia or malignancy.     [place] FINAL DIAGNOSIS:   Interpretation   Breast, core biopsy (12/20/2021):   1. Left breast, 6:00: Invasive ductal carcinoma with prominent cribriform growth pattern, Aminta grade I (of III).   The submitted immunostains for SMA, p63 and CK 5/6 show absence of myoepithelial cell layer in the tumor. The tumor is diffusely positive for ER. These findings support the above diagnosis.   2. Right breast, 10:00: Fibroadenomatoid nodule.     Ultrasound 10/10/22: R breast 4mm x 5mm x 6mm (decreased from 7 mm x 6 mm x 7 mm in June 2022)    No results found for this or any previous visit (from the past 24 hour(s)).    Assessment & Plan:    1. Malignant neoplasm of lower-outer quadrant of left breast of female, estrogen receptor positive    2. Aromatase inhibitor use    3. Encounter for screening mammogram for malignant neoplasm of breast      Patient  is on with anastrozole, initially as NAET intent, but has deferred surgery Overall she is tolerating this well without major side effects.   Follow up breast imaging on August 2023 showed continued MD to therapy.   Given asymmetry, will plan repeat breast imaging.   Will plan for AI indefinitely.   Will plan for gabapentin 300mg QAM/ 600mg QPM for sciatic pain     We did discuss that one of the possible side effects while on an AI is bone loss or osteoporosis. To help prevent this possible side effect, we advised that she continue taking daily Calcium and Vitamin D supplements. The daily recommended doses are 1000 IU of Vitamin D and 1200mg of Calcium. She can buy these supplements, such as Oscal-D® or Caltrate®, at most local stores. If she has osteopenia or osteoporosis on bone density, we will discuss Prolia in the near future.     Joint pain is a common side effect of an AI. Pain and aching in the bones and joints can range from mild discomfort that goes away by itself, to severe achiness that may require medication. We have suggested using over-the-counter, non-steroidal anti-inflammatory medications like Ibuprofen if she were to experience this side effect.     Patient is agreeable to this plan.       Med Onc Chart Routing      Follow up with physician 6 months.   Follow up with CHRIST[facility]    Infusion scheduling note    Injection scheduling note    Labs    Imaging    Pharmacy appointment    Other referrals                  MDM includes  :    - Acute or chronic illness or injury that poses a threat to life or bodily function  - Independent review and explanation of 1 results from unique tests  - Discussion of management and ordering 1 unique tests  - Extensive discussion of treatment and management  - Prescription drug management  - Drug therapy requiring intensive monitoring for toxicity        Aliza Olvera MD  01/03/2024

## 2023-12-28 DIAGNOSIS — Z17.0 MALIGNANT NEOPLASM OF LOWER-OUTER QUADRANT OF LEFT BREAST OF FEMALE, ESTROGEN RECEPTOR POSITIVE: Primary | ICD-10-CM

## 2023-12-28 DIAGNOSIS — C50.512 MALIGNANT NEOPLASM OF LOWER-OUTER QUADRANT OF LEFT BREAST OF FEMALE, ESTROGEN RECEPTOR POSITIVE: Primary | ICD-10-CM

## 2024-01-02 ENCOUNTER — HOSPITAL ENCOUNTER (OUTPATIENT)
Dept: RADIOLOGY | Facility: HOSPITAL | Age: 84
Discharge: HOME | End: 2024-01-02
Attending: INTERNAL MEDICINE
Payer: MEDICARE

## 2024-01-02 DIAGNOSIS — I10 ESSENTIAL HYPERTENSION: ICD-10-CM

## 2024-01-02 DIAGNOSIS — C50.512 MALIGNANT NEOPLASM OF LOWER-OUTER QUADRANT OF LEFT BREAST OF FEMALE, ESTROGEN RECEPTOR POSITIVE: ICD-10-CM

## 2024-01-02 DIAGNOSIS — Z17.0 MALIGNANT NEOPLASM OF LOWER-OUTER QUADRANT OF LEFT BREAST OF FEMALE, ESTROGEN RECEPTOR POSITIVE: ICD-10-CM

## 2024-01-02 PROCEDURE — 77066 DX MAMMO INCL CAD BI: CPT | Mod: 26,,, | Performed by: RADIOLOGY

## 2024-01-02 PROCEDURE — 77062 BREAST TOMOSYNTHESIS BI: CPT | Mod: 26,,, | Performed by: RADIOLOGY

## 2024-01-02 PROCEDURE — 77062 BREAST TOMOSYNTHESIS BI: CPT | Mod: TC

## 2024-01-02 NOTE — TELEPHONE ENCOUNTER
Care Due:                  Date            Visit Type   Department     Provider  --------------------------------------------------------------------------------                                EP -                              PRIMARY      [facility] INTERNAL  Last Visit: 10-      CARE ([facility])   LUIS Roque                              EP -                              PRIMARY      [facility] INTERNAL  Next Visit: 04-      CARE ([facility])   LUIS Roque                                                            Last  Test          Frequency    Reason                     Performed    Due Date  --------------------------------------------------------------------------------    CMP.........  12 months..  hydroCHLOROthiazide......  Not Found    Overdue    [employer] Embedded Care Due Messages. Reference number: [id number].   1/02/2024 3:15:50 PM CST

## 2024-01-04 RX ORDER — HYDROCHLOROTHIAZIDE 25 MG/1
25 TABLET ORAL DAILY
Qty: 90 TABLET | Refills: 3 | Status: SHIPPED | OUTPATIENT
Start: 2024-01-04

## 2024-03-04 ENCOUNTER — PATIENT MESSAGE (OUTPATIENT)
Dept: ADMINISTRATIVE | Facility: HOSPITAL | Age: 84
End: 2024-03-04
Payer: MEDICARE

## 2024-03-27 ENCOUNTER — PATIENT OUTREACH (OUTPATIENT)
Dept: ADMINISTRATIVE | Facility: HOSPITAL | Age: 84
End: 2024-03-27
Payer: MEDICARE

## 2024-04-23 ENCOUNTER — TELEPHONE (OUTPATIENT)
Dept: ORTHOPEDICS | Facility: CLINIC | Age: 84
End: 2024-04-23
Payer: MEDICARE

## 2024-04-23 NOTE — TELEPHONE ENCOUNTER
Spoke to pt and scheduled an appt for bilat knee csi and shoulder check on 5/2 at 6pm with allan pt verbalized understanding----- Message from Rebecca Brown sent at 4/23/2024  3:11 PM CDT -----  Regarding: injection  Name of Who is Calling:  Pt called         What is the request in detail:  The pt would like to be seen in this clinic again for a knee injection she also wants to know if she have a injection in her shoulder . Please advise         Can the clinic reply by [facility]:  N o        What Number to Call Back if not in [facility]:Telephone Information:  Mobile          [phone]

## 2024-05-04 ENCOUNTER — HOSPITAL ENCOUNTER (EMERGENCY)
Facility: OTHER | Age: 84
Discharge: HOME | End: 2024-05-04
Attending: EMERGENCY MEDICINE
Payer: MEDICARE

## 2024-05-04 VITALS
OXYGEN SATURATION: 97 % | WEIGHT: 182 LBS | TEMPERATURE: 98 F | RESPIRATION RATE: 18 BRPM | DIASTOLIC BLOOD PRESSURE: 86 MMHG | SYSTOLIC BLOOD PRESSURE: 161 MMHG | HEIGHT: 66 IN | HEART RATE: 82 BPM | BODY MASS INDEX: 29.25 KG/M2

## 2024-05-04 DIAGNOSIS — M54.32 SCIATICA OF LEFT SIDE: Primary | ICD-10-CM

## 2024-05-04 PROCEDURE — 99284 EMERGENCY DEPT VISIT MOD MDM: CPT | Mod: 25

## 2024-05-04 PROCEDURE — 96372 THER/PROPH/DIAG INJ SC/IM: CPT | Performed by: NURSE PRACTITIONER

## 2024-05-04 PROCEDURE — 25000003 PHARM REV CODE 250: Performed by: NURSE PRACTITIONER

## 2024-05-04 PROCEDURE — 63600175 PHARM REV CODE 636 W HCPCS: Performed by: NURSE PRACTITIONER

## 2024-05-04 RX ORDER — KETOROLAC TROMETHAMINE 10 MG/1
10 TABLET, FILM COATED ORAL EVERY 6 HOURS PRN
Qty: 10 TABLET | Refills: 0 | Status: SHIPPED | OUTPATIENT
Start: 2024-05-04

## 2024-05-04 RX ORDER — LIDOCAINE 50 MG/G
1 PATCH TOPICAL DAILY
Qty: 15 PATCH | Refills: 0 | Status: SHIPPED | OUTPATIENT
Start: 2024-05-04

## 2024-05-04 RX ORDER — LIDOCAINE 50 MG/G
1 PATCH TOPICAL ONCE
Status: DISCONTINUED | OUTPATIENT
Start: 2024-05-04 | End: 2024-05-04 | Stop reason: HOSPADM

## 2024-05-04 RX ORDER — DEXAMETHASONE SODIUM PHOSPHATE 4 MG/ML
8 INJECTION, SOLUTION INTRA-ARTICULAR; INTRALESIONAL; INTRAMUSCULAR; INTRAVENOUS; SOFT TISSUE
Status: COMPLETED | OUTPATIENT
Start: 2024-05-04 | End: 2024-05-04

## 2024-05-04 RX ORDER — METHOCARBAMOL 750 MG/1
750 TABLET, FILM COATED ORAL 2 TIMES DAILY PRN
Qty: 15 TABLET | Refills: 0 | Status: SHIPPED | OUTPATIENT
Start: 2024-05-04 | End: 2024-06-06 | Stop reason: SDUPTHER

## 2024-05-04 RX ORDER — ACETAMINOPHEN 325 MG/1
650 TABLET ORAL
Status: DISCONTINUED | OUTPATIENT
Start: 2024-05-04 | End: 2024-05-04

## 2024-05-04 RX ORDER — HYDROCODONE BITARTRATE AND ACETAMINOPHEN 5; 325 MG/1; MG/1
1 TABLET ORAL EVERY 8 HOURS PRN
Qty: 9 TABLET | Refills: 0 | Status: SHIPPED | OUTPATIENT
Start: 2024-05-04 | End: 2024-05-07

## 2024-05-04 RX ORDER — OXYCODONE AND ACETAMINOPHEN 5; 325 MG/1; MG/1
1 TABLET ORAL
Status: COMPLETED | OUTPATIENT
Start: 2024-05-04 | End: 2024-05-04

## 2024-05-04 RX ORDER — ACETAMINOPHEN 500 MG
1000 TABLET ORAL
Status: COMPLETED | OUTPATIENT
Start: 2024-05-04 | End: 2024-05-04

## 2024-05-04 RX ORDER — METHOCARBAMOL 500 MG/1
500 TABLET, FILM COATED ORAL
Status: COMPLETED | OUTPATIENT
Start: 2024-05-04 | End: 2024-05-04

## 2024-05-04 RX ORDER — KETOROLAC TROMETHAMINE 30 MG/ML
10 INJECTION, SOLUTION INTRAMUSCULAR; INTRAVENOUS
Status: COMPLETED | OUTPATIENT
Start: 2024-05-04 | End: 2024-05-04

## 2024-05-04 RX ADMIN — KETOROLAC TROMETHAMINE 10 MG: 30 INJECTION, SOLUTION INTRAMUSCULAR at 04:05

## 2024-05-04 RX ADMIN — METHOCARBAMOL 500 MG: 500 TABLET ORAL at 04:05

## 2024-05-04 RX ADMIN — OXYCODONE HYDROCHLORIDE AND ACETAMINOPHEN 1 TABLET: 5; 325 TABLET ORAL at 06:05

## 2024-05-04 RX ADMIN — LIDOCAINE 1 PATCH: 50 PATCH CUTANEOUS at 04:05

## 2024-05-04 RX ADMIN — DEXAMETHASONE SODIUM PHOSPHATE 8 MG: 4 INJECTION INTRA-ARTICULAR; INTRALESIONAL; INTRAMUSCULAR; INTRAVENOUS; SOFT TISSUE at 05:05

## 2024-05-04 RX ADMIN — ACETAMINOPHEN 1000 MG: 500 TABLET ORAL at 04:05

## 2024-05-04 NOTE — ED PROVIDER NOTES
"     Source of History:  Patient    Chief complaint:  Sciatica (Pt states that she has been having pain that runs down the back of her leg for over a week. )      HPI:  Karis Briceño is a 83 y.o. female with PMH of sciatica presenting with low back pain that radiates down her left leg for the past week.  She states the pain has worsened over the course of the last 2 days making it difficult for her to walk and take care of herself.  She was supposed to go to the [facility] today but was unable to tolerate the pain and thus presented to the emergency room.  Pain is worse with movement or pressure applied to the left side.  Pain is improved if she lays on her right side and does not move.  She states that she drove and thus can not have pain medication.  She has taken Aleve yesterday without significant relief.  She denies injury, recent fall, loss of bowel or bladder and perineal numbness.  She recently doubt was sciatica of the right side and states that she was given a shot that helped her before.  She denies dysuria and flank pain.      This is the extent to the patients complaints today here in the emergency department.    ROS: As per HPI     Review of patient's allergies indicates:  No Known Allergies    PMH:  As per HPI and below:  Past Medical History:   Diagnosis Date    Breast cancer 12/20/2021    Left breast IDC    Gallstone pancreatitis 09/2016    Gallstones     Hyperlipidemia     Hypertension      Past Surgical History:   Procedure Laterality Date    BREAST BIOPSY Bilateral 12/20/2021    Right side benign, Left side IDC    HYSTERECTOMY      BROOKS 3, AUB       Social History     Tobacco Use    Smoking status: Never    Smokeless tobacco: Never   Substance Use Topics    Alcohol use: Yes     Comment: socially    Drug use: No       Physical Exam:    BP (!) 161/86 (BP Location: Right arm, Patient Position: Sitting)   Pulse 82   Temp 98.1 °F (36.7 °C) (Oral)   Resp 18   Ht 5' 6" (1.676 m)   Wt 82.6 kg (182 lb)   " SpO2 97%   BMI 29.38 kg/m²   Nursing note and vital signs reviewed.  Appearance:  Appears uncomfortable, lying in the fetal position on her right side, resistant to move  Eyes: No conjunctival injection.  ENT: Normal phonation.  Musculoskeletal:  No thoracic or lumbar midline tenderness.  Pain and wincing elicited with even slight movement/repositioning, positive SLR  Skin: No rashes seen.  Good turgor.  No abrasions.  No ecchymoses. No erythema  Mental Status:  Alert and oriented x 3.  Appropriate, conversant.      Initial Impression/ Differential Dx:  Urgent evaluation of 83-year-old female presenting with left-sided low back pain that radiates down the back of her left leg the past week.  Patient is afebrile, appears uncomfortable but not toxic, hypertensive with known history of hypertension.  Patient is lying on her right side and is resistant to move secondary to pain.  She winces appears uncomfortable with even mild movement.  She does have a positive SLR.  There are no signs of saddle anesthesia, incontinence, neurologic deficits, fevers, trauma or midline tenderness on history or physical to suggest cauda equina, infectious process, fracture or subluxation. History and presentation most consistent with sciatica.  Will treat with Toradol, Tylenol, Lidoderm and low-dose muscle relaxer.      Differential Diagnosis includes, but is not limited to:  Cauda equina syndrome, diskitis/osteomyelitis, epidural/paraspinal abscess, AAA, aortic dissection, post-op/hardware infection, trauma/vertebral fracture, spinal cord injury, disc herniation, spinal stenosis, sciatica, radiculopathy, neoplasm, lumbar muscle strain, muscle spasm, neuropathic pain, UTI/pyelonephritis, nephrolithiasis.      MDM:    After treatment with Toradol, Robaxin and Lidoderm, patient reports improvement in her pain in his now able to reposition herself and stand without wincing or experiencing as severe pain, however patient states that she  continues to be uncomfortable.  Will trial Decadron injection and continue to reassess.    After steroid shot, patient continues to report pain.  She states that she has now been able to arrange a ride, therefore will treat with pain medicine.  Patient is requesting to be discharged home with supportive medications.  Will discharge home with Robaxin, Lidoderm, Toradol and a you pain meds.  Will also have patient follow-up with physical therapy, referral placed.  Patient has had prior referrals for physical therapy placed but has not followed through.  Counseled patient on the importance of following up with PT as this is the most effective way to alleviate sciatic back pain.  Patient verbalizes understanding and states that she will follow-up.  Counseled patient to follow up with her PCP as needed and discharged in good condition. Patient educated on signs and symptoms to monitor for and when to return to ED. Patient verbalized understanding agrees with treatment plan. All questions and concerns addressed.                    Diagnostic Impression:    1. Sciatica of left side         ED Disposition Condition    Discharge Good            ED Prescriptions       Medication Sig Dispense Start Date End Date Auth. Provider    HYDROcodone-acetaminophen (NORCO) 5-325 mg per tablet Take 1 tablet by mouth every 8 (eight) hours as needed for Pain. 9 tablet 5/4/2024 5/7/2024 Marissa Mcallister NP    methocarbamoL (ROBAXIN) 750 MG Tab Take 1 tablet (750 mg total) by mouth 2 (two) times daily as needed (muscle spasm/back pain). 15 tablet 5/4/2024 -- Marissa Mcallister NP    LIDOcaine (LIDODERM) 5 % Place 1 patch onto the skin once daily. Remove & Discard patch within 12 hours or as directed by MD 15 patch 5/4/2024 -- Marissa Mcallister NP    ketorolac (TORADOL) 10 mg tablet Take 1 tablet (10 mg total) by mouth every 6 (six) hours as needed for Pain. 10 tablet 5/4/2024 -- Marissa Mcallister NP          Follow-up Information        Follow up With Specialties Details Why Contact Info    Oly Roque MD Internal Medicine  As needed [address] [facility] [address]  [facility] [id number] [phone]               Marissa Mcallister, WES  05/05/24 [id number]

## 2024-05-04 NOTE — FIRST PROVIDER EVALUATION
Emergency Department TeleTriage Encounter Note      CHIEF COMPLAINT    Chief Complaint   Patient presents with    Sciatica     Pt states that she has been having pain that runs down the back of her leg for over a week.        VITAL SIGNS   Initial Vitals [05/04/24 1509]   BP Pulse Resp Temp SpO2   (!) 160/76 90 18 97.9 °F (36.6 °C) 99 %      MAP       --            ALLERGIES    Review of patient's allergies indicates:  No Known Allergies    PROVIDER TRIAGE NOTE  This is a teletriage evaluation of a 83 y.o. female presenting to the ED complaining of LLE pain for one week. Denies trauma.  Pmhx of sciatica.  Denies urinary symptoms, weakness.     Alert, sitting upright.     Initial orders will be placed and care will be transferred to an alternate provider when patient is roomed for a full evaluation. Any additional orders and the final disposition will be determined by that provider.         ORDERS  Labs Reviewed - No data to display    ED Orders (720h ago, onward)      None              Virtual Visit Note: The provider triage portion of this emergency department evaluation and documentation was performed via [employer], a HIPAA-compliant telemedicine application, in concert with a tele-presenter in the room. A face to face patient evaluation with one of my colleagues will occur once the patient is placed in an emergency department room.      DISCLAIMER: This note was prepared with [employer] voice recognition transcription software. Garbled syntax, mangled pronouns, and other bizarre constructions may be attributed to that software system.

## 2024-05-04 NOTE — ED TRIAGE NOTES
Pt presents to the ED wit c/o left leg pain onset 3 week ago. Pt denies any recent fall. Took oral meds with no relief. Pt AAOx3, NAD noted.

## 2024-05-08 ENCOUNTER — OFFICE VISIT (OUTPATIENT)
Dept: ORTHOPEDICS | Facility: CLINIC | Age: 84
End: 2024-05-08
Payer: MEDICARE

## 2024-05-08 DIAGNOSIS — Z79.811 AROMATASE INHIBITOR USE: ICD-10-CM

## 2024-05-08 DIAGNOSIS — M54.16 LUMBAR RADICULOPATHY, CHRONIC: Primary | ICD-10-CM

## 2024-05-08 DIAGNOSIS — M17.0 PRIMARY OSTEOARTHRITIS OF BOTH KNEES: ICD-10-CM

## 2024-05-08 DIAGNOSIS — Z17.0 MALIGNANT NEOPLASM OF LOWER-OUTER QUADRANT OF LEFT BREAST OF FEMALE, ESTROGEN RECEPTOR POSITIVE: ICD-10-CM

## 2024-05-08 DIAGNOSIS — C50.512 MALIGNANT NEOPLASM OF LOWER-OUTER QUADRANT OF LEFT BREAST OF FEMALE, ESTROGEN RECEPTOR POSITIVE: ICD-10-CM

## 2024-05-08 PROCEDURE — 99213 OFFICE O/P EST LOW 20 MIN: CPT | Mod: 25,S$GLB,, | Performed by: PHYSICIAN ASSISTANT

## 2024-05-08 PROCEDURE — 1126F AMNT PAIN NOTED NONE PRSNT: CPT | Mod: CPTII,S$GLB,, | Performed by: PHYSICIAN ASSISTANT

## 2024-05-08 PROCEDURE — 99999 PR PBB SHADOW E&M-EST. PATIENT-LVL III: CPT | Mod: PBBFAC,,, | Performed by: PHYSICIAN ASSISTANT

## 2024-05-08 PROCEDURE — 3288F FALL RISK ASSESSMENT DOCD: CPT | Mod: CPTII,S$GLB,, | Performed by: PHYSICIAN ASSISTANT

## 2024-05-08 PROCEDURE — 1160F RVW MEDS BY RX/DR IN RCRD: CPT | Mod: CPTII,S$GLB,, | Performed by: PHYSICIAN ASSISTANT

## 2024-05-08 PROCEDURE — 20610 DRAIN/INJ JOINT/BURSA W/O US: CPT | Mod: 50,S$GLB,, | Performed by: PHYSICIAN ASSISTANT

## 2024-05-08 PROCEDURE — 1159F MED LIST DOCD IN RCRD: CPT | Mod: CPTII,S$GLB,, | Performed by: PHYSICIAN ASSISTANT

## 2024-05-08 PROCEDURE — 1101F PT FALLS ASSESS-DOCD LE1/YR: CPT | Mod: CPTII,S$GLB,, | Performed by: PHYSICIAN ASSISTANT

## 2024-05-08 RX ORDER — GABAPENTIN 300 MG/1
CAPSULE ORAL
Qty: 90 CAPSULE | Refills: 3 | Status: SHIPPED | OUTPATIENT
Start: 2024-05-08 | End: 2024-05-22 | Stop reason: SDUPTHER

## 2024-05-08 RX ORDER — TRIAMCINOLONE ACETONIDE 40 MG/ML
40 INJECTION, SUSPENSION INTRA-ARTICULAR; INTRAMUSCULAR
Status: DISCONTINUED | OUTPATIENT
Start: 2024-05-08 | End: 2024-05-08 | Stop reason: HOSPADM

## 2024-05-08 RX ORDER — LIDOCAINE HYDROCHLORIDE 10 MG/ML
2 INJECTION INFILTRATION; PERINEURAL
Status: DISCONTINUED | OUTPATIENT
Start: 2024-05-08 | End: 2024-05-08 | Stop reason: HOSPADM

## 2024-05-08 RX ADMIN — TRIAMCINOLONE ACETONIDE 40 MG: 40 INJECTION, SUSPENSION INTRA-ARTICULAR; INTRAMUSCULAR at 02:05

## 2024-05-08 RX ADMIN — LIDOCAINE HYDROCHLORIDE 2 ML: 10 INJECTION INFILTRATION; PERINEURAL at 02:05

## 2024-05-08 NOTE — PROCEDURES
Large Joint Aspiration/Injection: bilateral knee    Date/Time: 5/8/2024 2:00 PM    Performed by: Mildred Medina PA-C  Authorized by: Mildred Medina PA-C    Consent Done?:  Yes (Verbal)  Indications:  Pain  Timeout: prior to procedure the correct patient, procedure, and site was verified    Prep: patient was prepped and draped in usual sterile fashion    Local anesthetic:  Topical anesthetic    Details:  Needle Size:  22 G  Approach:  Anterolateral  Location:  Knee  Laterality:  Bilateral  Site:  Bilateral knee  Medications (Right):  40 mg triamcinolone acetonide 40 mg/mL; 2 mL LIDOcaine HCL 10 mg/ml (1%) 10 mg/mL (1 %)  Medications (Left):  40 mg triamcinolone acetonide 40 mg/mL; 2 mL LIDOcaine HCL 10 mg/ml (1%) 10 mg/mL (1 %)  Patient tolerance:  Patient tolerated the procedure well with no immediate complications

## 2024-05-08 NOTE — PROGRESS NOTES
Patient ID: Karis Briceño is a 83 y.o. female.    Chief Complaint: Pain of the Left Knee, Pain of the Right Knee, and Numbness of the Left Foot      HISTORY:  Karis Briceño is a 83 y.o. female who returns to me today for follow up of bilateral knee pain.  She was last seen by me 6/13/2023.  Since then she has been treated for her back pain and radiculopathy.  She had PT, which was helpful.  She did not get injections in her back- stated she was too nervous.  She was seen in the ED a few days ago.  She still has pain down her left leg with numbness and tingling into her toes.      PMH/PSH/FamHx/SocHx:    Unchanged from prior visit.    ROS:  Constitution: Negative for chills, fever and weakness.   Respiratory: Negative for cough and shortness of breath.   Musculoskeletal: Positive for left leg pain  Psychiatric/Behavioral: The patient is not nervous/anxious.       PHYSICAL EXAM:   Bilateral knee  Skin intact  No warmth or erythema  TTP knee   No pain with passive hip rom  Weakness with dorsiflexion foot      IMAGING: X-rays of the bilateral knee, personally reviewed by me, demonstrate moderate degenerative changes with joint space narrowing, osteophyte formation and subchondral sclerosis.  No fracture or dislocation.     ASSESSMENT/PLAN:    Karis was seen today for pain, pain and numbness.    Diagnoses and all orders for this visit:    Lumbar radiculopathy, chronic  -     Ambulatory referral/consult to Pain Clinic; Future  -     Ambulatory referral/consult to Physical/Occupational Therapy; Future    Primary osteoarthritis of both knees  -     Large Joint Aspiration/Injection: bilateral knee    - Bilateral knee CSI performed today  - She will let me know how much improvement she gets in a a couple of weeks  - will have her see pain management to discuss options for her back pain/radiculopathy  - PT referral  - Continue gabapentin

## 2024-05-09 ENCOUNTER — TELEPHONE (OUTPATIENT)
Dept: ORTHOPEDICS | Facility: CLINIC | Age: 84
End: 2024-05-09
Payer: MEDICARE

## 2024-05-09 DIAGNOSIS — S46.911A SHOULDER STRAIN, RIGHT, INITIAL ENCOUNTER: ICD-10-CM

## 2024-05-09 DIAGNOSIS — M54.16 LUMBAR RADICULOPATHY, CHRONIC: Primary | ICD-10-CM

## 2024-05-09 NOTE — TELEPHONE ENCOUNTER
Called pt and pt did not answer.----- Message from Mildred Medina PA-C sent at 5/9/2024 11:47 AM CDT -----  Regarding: FW: Orders/Referral  Contact: pt [phone]    ----- Message -----  From: Abbie Lowe  Sent: 5/9/2024  11:40 AM CDT  To: Carol [facility] Staff  Subject: Orders/Referral                                  Patient Requesting Order      Order Needed: PT orders       Communication Preference: please call pt @[phone]      Additional Information:  [facility] [address], [phone]

## 2024-05-22 ENCOUNTER — LAB VISIT (OUTPATIENT)
Dept: LAB | Facility: OTHER | Age: 84
End: 2024-05-22
Attending: STUDENT IN AN ORGANIZED HEALTH CARE EDUCATION/TRAINING PROGRAM
Payer: MEDICARE

## 2024-05-22 ENCOUNTER — OFFICE VISIT (OUTPATIENT)
Dept: INTERNAL MEDICINE | Facility: CLINIC | Age: 84
End: 2024-05-22
Payer: MEDICARE

## 2024-05-22 ENCOUNTER — OFFICE VISIT (OUTPATIENT)
Dept: PAIN MEDICINE | Facility: CLINIC | Age: 84
End: 2024-05-22
Attending: ANESTHESIOLOGY
Payer: MEDICARE

## 2024-05-22 VITALS
SYSTOLIC BLOOD PRESSURE: 138 MMHG | BODY MASS INDEX: 29.93 KG/M2 | WEIGHT: 185.44 LBS | DIASTOLIC BLOOD PRESSURE: 82 MMHG | HEART RATE: 80 BPM

## 2024-05-22 VITALS
WEIGHT: 184.5 LBS | SYSTOLIC BLOOD PRESSURE: 162 MMHG | BODY MASS INDEX: 29.65 KG/M2 | HEART RATE: 85 BPM | DIASTOLIC BLOOD PRESSURE: 84 MMHG | HEIGHT: 66 IN | TEMPERATURE: 99 F

## 2024-05-22 DIAGNOSIS — E53.8 VITAMIN B12 DEFICIENCY: ICD-10-CM

## 2024-05-22 DIAGNOSIS — R73.03 PREDIABETES: ICD-10-CM

## 2024-05-22 DIAGNOSIS — E55.9 VITAMIN D DEFICIENCY: ICD-10-CM

## 2024-05-22 DIAGNOSIS — Z00.00 HEALTH MAINTENANCE EXAMINATION: Primary | ICD-10-CM

## 2024-05-22 DIAGNOSIS — R53.83 FATIGUE, UNSPECIFIED TYPE: ICD-10-CM

## 2024-05-22 DIAGNOSIS — Z11.4 SCREENING FOR HIV WITHOUT PRESENCE OF RISK FACTORS: ICD-10-CM

## 2024-05-22 DIAGNOSIS — C50.512 MALIGNANT NEOPLASM OF LOWER-OUTER QUADRANT OF LEFT BREAST OF FEMALE, ESTROGEN RECEPTOR POSITIVE: ICD-10-CM

## 2024-05-22 DIAGNOSIS — Z23 NEED FOR VACCINATION: ICD-10-CM

## 2024-05-22 DIAGNOSIS — I10 ESSENTIAL HYPERTENSION: ICD-10-CM

## 2024-05-22 DIAGNOSIS — M54.16 LUMBAR RADICULOPATHY, CHRONIC: ICD-10-CM

## 2024-05-22 DIAGNOSIS — E78.2 MIXED HYPERLIPIDEMIA: ICD-10-CM

## 2024-05-22 DIAGNOSIS — Z17.0 MALIGNANT NEOPLASM OF LOWER-OUTER QUADRANT OF LEFT BREAST OF FEMALE, ESTROGEN RECEPTOR POSITIVE: ICD-10-CM

## 2024-05-22 DIAGNOSIS — M54.9 DORSALGIA, UNSPECIFIED: ICD-10-CM

## 2024-05-22 DIAGNOSIS — Z11.59 ENCOUNTER FOR HEPATITIS C SCREENING TEST FOR LOW RISK PATIENT: ICD-10-CM

## 2024-05-22 DIAGNOSIS — Z00.00 HEALTH MAINTENANCE EXAMINATION: ICD-10-CM

## 2024-05-22 DIAGNOSIS — R01.1 NEWLY RECOGNIZED HEART MURMUR: ICD-10-CM

## 2024-05-22 DIAGNOSIS — G89.29 CHRONIC RIGHT-SIDED LOW BACK PAIN WITH RIGHT-SIDED SCIATICA: ICD-10-CM

## 2024-05-22 DIAGNOSIS — N28.89 RENAL MASS: Primary | ICD-10-CM

## 2024-05-22 DIAGNOSIS — M54.41 CHRONIC RIGHT-SIDED LOW BACK PAIN WITH RIGHT-SIDED SCIATICA: ICD-10-CM

## 2024-05-22 LAB
25(OH)D3+25(OH)D2 SERPL-MCNC: 28 NG/ML (ref 30–96)
ALBUMIN SERPL BCP-MCNC: 3.7 G/DL (ref 3.5–5.2)
ALP SERPL-CCNC: 74 U/L (ref 55–135)
ALT SERPL W/O P-5'-P-CCNC: 22 U/L (ref 10–44)
ANION GAP SERPL CALC-SCNC: 13 MMOL/L (ref 8–16)
AST SERPL-CCNC: 24 U/L (ref 10–40)
BASOPHILS # BLD AUTO: 0.05 K/UL (ref 0–0.2)
BASOPHILS NFR BLD: 0.7 % (ref 0–1.9)
BILIRUB SERPL-MCNC: 0.7 MG/DL (ref 0.1–1)
BUN SERPL-MCNC: 26 MG/DL (ref 8–23)
CALCIUM SERPL-MCNC: 9.6 MG/DL (ref 8.7–10.5)
CHLORIDE SERPL-SCNC: 103 MMOL/L (ref 95–110)
CHOLEST SERPL-MCNC: 220 MG/DL (ref 120–199)
CHOLEST/HDLC SERPL: 3.3 {RATIO} (ref 2–5)
CO2 SERPL-SCNC: 26 MMOL/L (ref 23–29)
CREAT SERPL-MCNC: 1.1 MG/DL (ref 0.5–1.4)
DIFFERENTIAL METHOD BLD: ABNORMAL
EOSINOPHIL # BLD AUTO: 0.1 K/UL (ref 0–0.5)
EOSINOPHIL NFR BLD: 1.9 % (ref 0–8)
ERYTHROCYTE [DISTWIDTH] IN BLOOD BY AUTOMATED COUNT: 12.7 % (ref 11.5–14.5)
EST. GFR  (NO RACE VARIABLE): 50 ML/MIN/1.73 M^2
ESTIMATED AVG GLUCOSE: 137 MG/DL (ref 68–131)
GLUCOSE SERPL-MCNC: 121 MG/DL (ref 70–110)
HBA1C MFR BLD: 6.4 % (ref 4–5.6)
HCT VFR BLD AUTO: 45.2 % (ref 37–48.5)
HCV AB SERPL QL IA: NEGATIVE
HDLC SERPL-MCNC: 66 MG/DL (ref 40–75)
HDLC SERPL: 30 % (ref 20–50)
HGB BLD-MCNC: 14.8 G/DL (ref 12–16)
HIV 1+2 AB+HIV1 P24 AG SERPL QL IA: NEGATIVE
IMM GRANULOCYTES # BLD AUTO: 0.04 K/UL (ref 0–0.04)
IMM GRANULOCYTES NFR BLD AUTO: 0.6 % (ref 0–0.5)
LDLC SERPL CALC-MCNC: 130.2 MG/DL (ref 63–159)
LYMPHOCYTES # BLD AUTO: 1.4 K/UL (ref 1–4.8)
LYMPHOCYTES NFR BLD: 20.3 % (ref 18–48)
MCH RBC QN AUTO: 30.9 PG (ref 27–31)
MCHC RBC AUTO-ENTMCNC: 32.7 G/DL (ref 32–36)
MCV RBC AUTO: 94 FL (ref 82–98)
MONOCYTES # BLD AUTO: 0.4 K/UL (ref 0.3–1)
MONOCYTES NFR BLD: 6.1 % (ref 4–15)
NEUTROPHILS # BLD AUTO: 4.8 K/UL (ref 1.8–7.7)
NEUTROPHILS NFR BLD: 70.4 % (ref 38–73)
NONHDLC SERPL-MCNC: 154 MG/DL
NRBC BLD-RTO: 0 /100 WBC
PLATELET # BLD AUTO: 232 K/UL (ref 150–450)
PMV BLD AUTO: 9.1 FL (ref 9.2–12.9)
POTASSIUM SERPL-SCNC: 3.8 MMOL/L (ref 3.5–5.1)
PROT SERPL-MCNC: 7.5 G/DL (ref 6–8.4)
RBC # BLD AUTO: 4.79 M/UL (ref 4–5.4)
SODIUM SERPL-SCNC: 142 MMOL/L (ref 136–145)
TRIGL SERPL-MCNC: 119 MG/DL (ref 30–150)
TSH SERPL DL<=0.005 MIU/L-ACNC: 0.77 UIU/ML (ref 0.4–4)
VIT B12 SERPL-MCNC: 411 PG/ML (ref 210–950)
WBC # BLD AUTO: 6.88 K/UL (ref 3.9–12.7)

## 2024-05-22 PROCEDURE — 3077F SYST BP >= 140 MM HG: CPT | Mod: CPTII,S$GLB,, | Performed by: ANESTHESIOLOGY

## 2024-05-22 PROCEDURE — 85025 COMPLETE CBC W/AUTO DIFF WBC: CPT | Performed by: STUDENT IN AN ORGANIZED HEALTH CARE EDUCATION/TRAINING PROGRAM

## 2024-05-22 PROCEDURE — 36415 COLL VENOUS BLD VENIPUNCTURE: CPT | Performed by: STUDENT IN AN ORGANIZED HEALTH CARE EDUCATION/TRAINING PROGRAM

## 2024-05-22 PROCEDURE — 1160F RVW MEDS BY RX/DR IN RCRD: CPT | Mod: CPTII,S$GLB,, | Performed by: ANESTHESIOLOGY

## 2024-05-22 PROCEDURE — 1125F AMNT PAIN NOTED PAIN PRSNT: CPT | Mod: CPTII,S$GLB,, | Performed by: ANESTHESIOLOGY

## 2024-05-22 PROCEDURE — 1101F PT FALLS ASSESS-DOCD LE1/YR: CPT | Mod: CPTII,S$GLB,, | Performed by: ANESTHESIOLOGY

## 2024-05-22 PROCEDURE — 84443 ASSAY THYROID STIM HORMONE: CPT | Performed by: STUDENT IN AN ORGANIZED HEALTH CARE EDUCATION/TRAINING PROGRAM

## 2024-05-22 PROCEDURE — 3079F DIAST BP 80-89 MM HG: CPT | Mod: CPTII,S$GLB,, | Performed by: ANESTHESIOLOGY

## 2024-05-22 PROCEDURE — 80053 COMPREHEN METABOLIC PANEL: CPT | Performed by: STUDENT IN AN ORGANIZED HEALTH CARE EDUCATION/TRAINING PROGRAM

## 2024-05-22 PROCEDURE — 3079F DIAST BP 80-89 MM HG: CPT | Mod: CPTII,S$GLB,, | Performed by: STUDENT IN AN ORGANIZED HEALTH CARE EDUCATION/TRAINING PROGRAM

## 2024-05-22 PROCEDURE — 87389 HIV-1 AG W/HIV-1&-2 AB AG IA: CPT | Performed by: STUDENT IN AN ORGANIZED HEALTH CARE EDUCATION/TRAINING PROGRAM

## 2024-05-22 PROCEDURE — 99999 PR PBB SHADOW E&M-EST. PATIENT-LVL III: CPT | Mod: PBBFAC,,, | Performed by: STUDENT IN AN ORGANIZED HEALTH CARE EDUCATION/TRAINING PROGRAM

## 2024-05-22 PROCEDURE — 82607 VITAMIN B-12: CPT | Performed by: STUDENT IN AN ORGANIZED HEALTH CARE EDUCATION/TRAINING PROGRAM

## 2024-05-22 PROCEDURE — 99999 PR PBB SHADOW E&M-EST. PATIENT-LVL V: CPT | Mod: PBBFAC,,, | Performed by: ANESTHESIOLOGY

## 2024-05-22 PROCEDURE — 1101F PT FALLS ASSESS-DOCD LE1/YR: CPT | Mod: CPTII,S$GLB,, | Performed by: STUDENT IN AN ORGANIZED HEALTH CARE EDUCATION/TRAINING PROGRAM

## 2024-05-22 PROCEDURE — 80061 LIPID PANEL: CPT | Performed by: STUDENT IN AN ORGANIZED HEALTH CARE EDUCATION/TRAINING PROGRAM

## 2024-05-22 PROCEDURE — 99204 OFFICE O/P NEW MOD 45 MIN: CPT | Mod: GC,S$GLB,, | Performed by: ANESTHESIOLOGY

## 2024-05-22 PROCEDURE — 3075F SYST BP GE 130 - 139MM HG: CPT | Mod: CPTII,S$GLB,, | Performed by: STUDENT IN AN ORGANIZED HEALTH CARE EDUCATION/TRAINING PROGRAM

## 2024-05-22 PROCEDURE — 1159F MED LIST DOCD IN RCRD: CPT | Mod: CPTII,S$GLB,, | Performed by: STUDENT IN AN ORGANIZED HEALTH CARE EDUCATION/TRAINING PROGRAM

## 2024-05-22 PROCEDURE — 82306 VITAMIN D 25 HYDROXY: CPT | Performed by: STUDENT IN AN ORGANIZED HEALTH CARE EDUCATION/TRAINING PROGRAM

## 2024-05-22 PROCEDURE — 99397 PER PM REEVAL EST PAT 65+ YR: CPT | Mod: S$GLB,,, | Performed by: STUDENT IN AN ORGANIZED HEALTH CARE EDUCATION/TRAINING PROGRAM

## 2024-05-22 PROCEDURE — 1159F MED LIST DOCD IN RCRD: CPT | Mod: CPTII,S$GLB,, | Performed by: ANESTHESIOLOGY

## 2024-05-22 PROCEDURE — 83036 HEMOGLOBIN GLYCOSYLATED A1C: CPT | Performed by: STUDENT IN AN ORGANIZED HEALTH CARE EDUCATION/TRAINING PROGRAM

## 2024-05-22 PROCEDURE — 3288F FALL RISK ASSESSMENT DOCD: CPT | Mod: CPTII,S$GLB,, | Performed by: STUDENT IN AN ORGANIZED HEALTH CARE EDUCATION/TRAINING PROGRAM

## 2024-05-22 PROCEDURE — 86803 HEPATITIS C AB TEST: CPT | Performed by: STUDENT IN AN ORGANIZED HEALTH CARE EDUCATION/TRAINING PROGRAM

## 2024-05-22 PROCEDURE — 3288F FALL RISK ASSESSMENT DOCD: CPT | Mod: CPTII,S$GLB,, | Performed by: ANESTHESIOLOGY

## 2024-05-22 RX ORDER — DIAZEPAM 2 MG/1
TABLET ORAL
Qty: 3 TABLET | Refills: 0 | Status: SHIPPED | OUTPATIENT
Start: 2024-05-22

## 2024-05-22 RX ORDER — CARVEDILOL 3.12 MG/1
3.12 TABLET ORAL 2 TIMES DAILY WITH MEALS
Qty: 180 TABLET | Refills: 3 | Status: SHIPPED | OUTPATIENT
Start: 2024-05-22 | End: 2025-05-22

## 2024-05-22 RX ORDER — GABAPENTIN 300 MG/1
CAPSULE ORAL
Qty: 360 CAPSULE | Refills: 3 | Status: SHIPPED | OUTPATIENT
Start: 2024-05-22 | End: 2024-06-06 | Stop reason: ALTCHOICE

## 2024-05-22 NOTE — PROGRESS NOTES
Subjective:       Patient ID: Karis Briceño is a 83 y.o. female.    Chief Complaint: Health maintenance examination [Z00.00]    Patient is established with me, here today for the following:    Health maintenance -   Never previously had colorectal cancer screening, declines screening.  Denies family history of colorectal cancer.  Denies family history of breast cancer.  Denies family history of ovarian cancer.  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   Completed DEXA in DEC2022.  Showed normal bone density.  Family history of cardiac disease.  UTD on Tdap, COVID primary/bivalent, PPSV23, PCV13 vaccinations.  Due for RSV, shingles, COVID vaccinations.  Never smoker.  Drinks alcohol 5-7 times weekly, 2-3 beers per sitting.  Denies drug use.  Due for HIV and hepatitis C screening.           HTN -   Currently prescribed amlodipine, valsartan, HCTZ.   Patient endorses taking medication as directed.  Denies side effects or concerns while taking medication.  Due for micro albumin creatinine ratio.   BP Readings from Last 5 Encounters:  05/22/24 : (!) 162/84  05/04/24 : (!) 161/86  12/26/23 : (!) 143/74  10/10/23 : (!) 146/77  07/13/23 : 132/68     History of breast cancer -   Noted left breast lump in OCT2021  Underwent bilateral breast biopsy DEC2021, pathology with right breast infiltrating ductal carcinoma and left breast fibroadenoma  Elected not to under lumpectomy   Taking anastrozole as directed, started medication in JAN2023  Following with Dr. Olvera routinely for oncology.  Obtaining regular mammograms for monitoring     Following with Dr. Song routinely for pain management.  Plans for MRI of lumbar spine  Taking gabapentin for pain with incomplete effect  Participating in physical therapy      HLD -   Endorses taking atorvastatin as directed  Denies side effects or concerns while taking medication  Lab Results       Component                Value               Date                       CHOL                     133                  03/04/2022            Lab Results       Component                Value               Date                       TRIG                     63                  03/04/2022            Lab Results       Component                Value               Date                       LDLCALC                  60.4 (L)            03/04/2022            Lab Results       Component                Value               Date                       HDL                      60                  03/04/2022            Due for lipid recheck.     Prediabetes -   Due for diabetes screening.  Lab Results       Component                Value               Date                       HGBA1C                   6.2 (H)             11/08/2022                 HGBA1C                   6.1 (H)             03/04/2022                 HGBA1C                   6.2 (H)             10/11/2021                 Vitamin D deficiency -  Intermittently taking vitamin D3 supplementation.     Endorses recent fatigue over the past few months  Denies CP, palpitations, or SOB  Has never previously been aware of heart murmur          Review of Systems   Constitutional:  Positive for fatigue. Negative for activity change and fever.   Respiratory:  Negative for cough and shortness of breath.    Cardiovascular:  Negative for chest pain and palpitations.   Gastrointestinal:  Negative for abdominal pain, constipation, diarrhea, nausea and vomiting.   Musculoskeletal:  Positive for arthralgias, back pain and myalgias.   Neurological:  Negative for syncope and headaches.         Current Outpatient Medications   Medication Instructions    amLODIPine (NORVASC) 10 mg, Oral, Daily    anastrozole (ARIMIDEX) 1 mg, Oral, Daily    aspirin (ECOTRIN) 81 mg, Oral, Daily    atorvastatin (LIPITOR) 40 mg, Oral, Daily    carvediloL (COREG) 3.125 mg, Oral, 2 times daily with meals    diazePAM (VALIUM) 2 MG tablet On call to MRI may repeat X 3 do not drive to or from MRI/procedure & for 24 hours     diclofenac sodium (VOLTAREN) 2 g, Topical (Top), 4 times daily PRN    ergocalciferol (ERGOCALCIFEROL) 50,000 Units, Oral, Every 7 days    fish oil-omega-3 fatty acids 300-1,000 mg capsule 1 capsule, Oral, Daily    gabapentin (NEURONTIN) 300 MG capsule Take 1 capsule (300 mg total) by mouth before breakfast AND 1 capsule (300 mg total) daily with lunch AND 2 capsules (600 mg total) every evening. Take 1 capsule (300mg) in the morning, and 2 capsules (600mg) at bedtime.    hydroCHLOROthiazide (HYDRODIURIL) 25 mg, Oral, Daily    ketorolac (TORADOL) 10 mg, Oral, Every 6 hours PRN    LIDOcaine (LIDODERM) 5 % 1 patch, Transdermal, Daily, Remove & Discard patch within 12 hours or as directed by MD    methocarbamoL (ROBAXIN) 750 mg, Oral, 2 times daily PRN    valsartan (DIOVAN) 320 mg, Oral, Daily     Objective:      Vitals:    05/22/24 1325   BP: 138/82   Pulse: 80   Weight: 84.1 kg (185 lb 6.5 oz)     Body mass index is 29.93 kg/m².    Physical Exam  Vitals reviewed.   Constitutional:       General: She is not in acute distress.     Appearance: Normal appearance. She is not ill-appearing or diaphoretic.   HENT:      Head: Normocephalic and atraumatic.      Right Ear: Tympanic membrane, ear canal and external ear normal. There is no impacted cerumen.      Left Ear: Tympanic membrane, ear canal and external ear normal. There is no impacted cerumen.      Nose: Nose normal. No rhinorrhea.      Mouth/Throat:      Mouth: Mucous membranes are moist.      Pharynx: Oropharynx is clear. No oropharyngeal exudate or posterior oropharyngeal erythema.   Eyes:      General: No scleral icterus.        Right eye: No discharge.         Left eye: No discharge.      Conjunctiva/sclera: Conjunctivae normal.   Neck:      Thyroid: No thyromegaly or thyroid tenderness.      Trachea: Trachea normal.   Cardiovascular:      Rate and Rhythm: Normal rate and regular rhythm.      Heart sounds: Murmur heard.      Systolic murmur is present with a  grade of 3/6.      No friction rub. No gallop.   Pulmonary:      Effort: Pulmonary effort is normal. No respiratory distress.      Breath sounds: Normal breath sounds. No stridor. No wheezing, rhonchi or rales.   Musculoskeletal:      Cervical back: Neck supple.   Lymphadenopathy:      Head:      Right side of head: No submandibular or posterior auricular adenopathy.      Left side of head: No submandibular or posterior auricular adenopathy.      Cervical: No cervical adenopathy.      Right cervical: No superficial, deep or posterior cervical adenopathy.     Left cervical: No superficial, deep or posterior cervical adenopathy.      Upper Body:      Right upper body: No supraclavicular adenopathy.      Left upper body: No supraclavicular adenopathy.   Skin:     General: Skin is warm and dry.      Comments: Color WNL   Neurological:      Mental Status: She is alert. Mental status is at baseline.   Psychiatric:         Mood and Affect: Mood normal.         Behavior: Behavior normal.         Assessment:       1. Health maintenance examination    2. Essential hypertension    3. Malignant neoplasm of lower-outer quadrant of left breast of female, estrogen receptor positive    4. Chronic right-sided low back pain with right-sided sciatica    5. Mixed hyperlipidemia    6. Prediabetes    7. Vitamin D deficiency    8. Vitamin B12 deficiency    9. Need for vaccination    10. Screening for HIV without presence of risk factors    11. Encounter for hepatitis C screening test for low risk patient    12. Newly recognized heart murmur    13. Fatigue, unspecified type        Plan:       Essential hypertension  Continue current medications.  RTC in 4 months for follow up.  -     Comprehensive Metabolic Panel; Future  -     TSH; Future  -     CBC Auto Differential; Future  -     Microalbumin/Creatinine Ratio, Urine; Future  -     carvediloL (COREG) 3.125 MG tablet; Take 1 tablet (3.125 mg total) by mouth 2 (two) times daily with  meals.    Malignant neoplasm of lower-outer quadrant of left breast of female, estrogen receptor positive  Continue medication, evaluation, and management per oncologist.    Chronic right-sided low back pain with right-sided sciatica  Increase gabapentin to 600 mg at bedtime, continue 300 mg AM and afternoon  Continue medication, evaluation, and management per pain management.  -     gabapentin (NEURONTIN) 300 MG capsule; Take 1 capsule (300 mg total) by mouth before breakfast AND 1 capsule (300 mg total) daily with lunch AND 2 capsules (600 mg total) every evening. Take 1 capsule (300mg) in the morning, and 2 capsules (600mg) at bedtime.    Mixed hyperlipidemia  Continue current medications.  RTC in 4 months for follow up.  -     Lipid Panel; Future    Prediabetes  Continue healthy diet and lifestyle modifications  RTC in 4 months for follow up.  -     Hemoglobin A1C; Future    Vitamin D deficiency  Continue supplementation.  RTC in 4 months for follow up.  -     Vitamin D; Future  -     ergocalciferol (ERGOCALCIFEROL) 50,000 unit Cap; Take 1 capsule (50,000 Units total) by mouth every 7 days.    Vitamin B12 deficiency  RTC in 4 months for follow up.  -     Vitamin B12; Future    Newly recognized heart murmur  Fatigue, unspecified type  RTC in 4 months for follow up.  -     Echo; Future    Health maintenance examination  Reviewed and discussed age appropriate screenings and immunizations.  -     Comprehensive Metabolic Panel; Future  -     TSH; Future  -     Lipid Panel; Future  -     Hemoglobin A1C; Future  -     CBC Auto Differential; Future  -     Microalbumin/Creatinine Ratio, Urine; Future  -     Vitamin D; Future  -     Vitamin B12; Future  -     HIV 1/2 Ag/Ab (4th Gen); Future  -     Hepatitis C Antibody; Future    Need for vaccination  Discussed recommended vaccinations and how to obtain.    Screening for HIV without presence of risk factors  -     HIV 1/2 Ag/Ab (4th Gen); Future    Encounter for hepatitis  C screening test for low risk patient  -     Hepatitis C Antibody; Future        Oly Roque MD  5/22/2024

## 2024-05-22 NOTE — PROGRESS NOTES
Chronic Pain - New Consult    Referring Physician: Mildred Medina PA-C    Chief Complaint:   Chief Complaint   Patient presents with    Low-back Pain        SUBJECTIVE:    Karis rBiceño presents to the clinic for the evaluation of left buttock and thigh pain. The pain started 3 weeks ago without inciting event and symptoms have been unchanged.The pain is located in the left buttock and posterior thigh area and radiates to the left knee.  The pain is described as sharp and tingling and is rated as 7/10. The pain is rated with a score of  5/10 on the BEST day and a score of 8/10 on the WORST day.  Symptoms interfere with daily activity. The pain is exacerbated by Standing, Touching, Walking, and Extension.  The pain is mitigated by medications. She reports paraesthesia along her lateral LLE and lateral foot. She takes Gabapentin 300mg morning and 600mg QHS and BC powder with some relief.  She went to the ED 3 weeks ago and received Steroid injection, toradol injection, lidocaine patch, and Robaxin. Had previous Right L4/L5 TF PRASAD cancelled in 11/2023 due to anxiousness about procedure.    Patient denies night fever/night sweats, urinary incontinence, bowel incontinence, significant weight loss, significant motor weakness.    Physical Therapy/Home Exercise: no      Pain Disability Index Review:      5/22/2024     9:48 AM   Last 3 PDI Scores   Pain Disability Index (PDI) 42       Pain Medications:  Gabapentin  BC arthritis powder     report:  Reviewed and consistent with medication use as prescribed.    Pain Procedures:   None    Imaging:   CT Lumbar spine FINDINGS: 9/2023  Alignment: Grade 1 anterolisthesis of L3 on L4.     Vertebrae: No acute fracture.  Multilevel degenerative endplate changes.  No suspicious lytic or blastic lesions.     Discs: Degenerative disc space narrowing extending from L2-L3 through L5-S1.  Vacuum phenomenon at L2-L3, L4-L5 and L5-S1.  No endplate erosion.     Sacroiliac joints:  Symmetric degenerative changes.     Degenerative findings:     T12-L1: No spinal canal stenosis.  No neural foraminal narrowing.     L1-L2: No spinal canal stenosis.  No neural foraminal narrowing     L2-L3: Circumferential disc bulge.  Bilateral facet arthropathy, bilateral ligamentum flavum hypertrophy and prominent posterior epidural fat.  Moderate spinal canal and lateral recess stenosis.  Moderate right and mild left neural foraminal narrowing.     L3-L4: Grade 1 anterolisthesis with uncovering the intervertebral disc.  Circumferential disc bulge.  Bilateral facet arthropathy, bilateral ligamentum flavum hypertrophy and prominent posterior epidural fat.  Severe spinal canal and lateral recess stenosis.  Moderate bilateral neural foraminal narrowing.     L4-L5: Circumferential disc bulge with a superimposed posterior central protrusion.  Bilateral facet arthropathy, bilateral ligamentum flavum hypertrophy and prominent posterior epidural fat.  Severe spinal canal and lateral recess stenosis.  Severe left and moderate to severe right neural foraminal narrowing.     L5-S1: Circumferential disc bulge.  Bilateral facet arthropathy, bilateral ligamentum flavum hypertrophy and prominent posterior epidural fat.  Moderate spinal canal stenosis.  Severe bilateral neural foraminal narrowing.     Paraspinal muscles & soft tissues: Multiple calcified gallstones.  0.7 cm calcified left renal stone.  0.9 cm left renal cortical lesion that demonstrates attenuation values higher than that of a simple cyst.  Trace atherosclerotic calcification of the abdominal aorta and iliac arteries.  Mild fatty degeneration of the posterior paraspinal musculature.     Impression:  Advanced lumbar spondylosis most severe from L2-L3 through L5-S1 as detailed above.  Cholelithiasis.  Left nonobstructing nephrolithiasis.  0.9 cm left renal cortical lesion that does not meet CT criteria for a simple cyst.  Recommend further characterization with a  dedicated renal ultrasound.    LXR FINDINGS: 7/2023  No evidence of an acute fracture or dislocation.  Alignment is maintained.  Intervertebral disc height loss worse at inferior lumbar levels.     Impression:     Degenerative change at inferior lumbar levels without instability.    Past Medical History:   Diagnosis Date    Breast cancer 12/20/2021    Left breast IDC    Gallstone pancreatitis 09/2016    Gallstones     Hyperlipidemia     Hypertension      Past Surgical History:   Procedure Laterality Date    BREAST BIOPSY Bilateral 12/20/2021    Right side benign, Left side IDC    HYSTERECTOMY      BROOKS 3, AUB     Social History     Socioeconomic History    Marital status: Single   Tobacco Use    Smoking status: Never    Smokeless tobacco: Never   Substance and Sexual Activity    Alcohol use: Yes     Comment: socially    Drug use: No    Sexual activity: Not Currently     Family History   Problem Relation Name Age of Onset    No Known Problems Mother      Heart attack Father      Coronary artery disease Father      Coronary artery disease Sister      Heart attack Sister      Skin cancer Neg Hx      Colon cancer Neg Hx      Breast cancer Neg Hx      Ovarian cancer Neg Hx         Review of patient's allergies indicates:  No Known Allergies    Current Outpatient Medications   Medication Sig    amLODIPine (NORVASC) 10 MG tablet Take 1 tablet (10 mg total) by mouth once daily.    anastrozole (ARIMIDEX) 1 mg Tab Take 1 tablet (1 mg total) by mouth once daily.    atorvastatin (LIPITOR) 40 MG tablet Take 1 tablet (40 mg total) by mouth once daily.    diclofenac sodium (VOLTAREN) 1 % Gel Apply 2 g topically 4 (four) times daily as needed.    fish oil-omega-3 fatty acids 300-1,000 mg capsule Take 1 capsule by mouth once daily.    gabapentin (NEURONTIN) 300 MG capsule Take 1 capsule (300mg) in the morning, and 2 capsules (600mg) at bedtime    hydroCHLOROthiazide (HYDRODIURIL) 25 MG tablet Take 1 tablet (25 mg total) by mouth once  "daily.    ketorolac (TORADOL) 10 mg tablet Take 1 tablet (10 mg total) by mouth every 6 (six) hours as needed for Pain.    LIDOcaine (LIDODERM) 5 % Place 1 patch onto the skin once daily. Remove & Discard patch within 12 hours or as directed by MD    methocarbamoL (ROBAXIN) 750 MG Tab Take 1 tablet (750 mg total) by mouth 2 (two) times daily as needed (muscle spasm/back pain).    valsartan (DIOVAN) 320 MG tablet Take 1 tablet (320 mg total) by mouth once daily.    aspirin (ECOTRIN) 81 MG EC tablet Take 1 tablet (81 mg total) by mouth once daily. (Patient not taking: Reported on 6/23/2022)    diazePAM (VALIUM) 2 MG tablet On call to MRI may repeat X 3 do not drive to or from MRI/procedure & for 24 hours     No current facility-administered medications for this visit.       REVIEW OF SYSTEMS:    GENERAL:  No weight loss, malaise or fevers.  HEENT:  Negative for frequent or significant headaches.  NECK:  Negative for lumps, goiter, pain and significant neck swelling.  RESPIRATORY:  Negative for cough, wheezing or shortness of breath.  CARDIOVASCULAR:  Negative for chest pain, leg swelling or palpitations.  GI:  Negative for abdominal discomfort, blood in stools or black stools or change in bowel habits.  MUSCULOSKELETAL:  See HPI. Left buttock pain and posterior leg pain.  SKIN:  Negative for lesions, rash, and itching.  PSYCH:  Negative for sleep disturbance, mood disorder and recent psychosocial stressors.  HEMATOLOGY/LYMPHOLOGY:  Negative for prolonged bleeding, bruising easily or swollen nodes.  NEURO:   No history of headaches, syncope, paralysis, seizures or tremors. Radiating pain down to left knee. Paraesthesia in lateral leg and foot.  All other reviewed and negative other than HPI.    OBJECTIVE:    BP (!) 162/84 (BP Location: Left arm, Patient Position: Sitting, BP Method: Large (Automatic))   Pulse 85   Temp 99 °F (37.2 °C) (Oral)   Ht 5' 6" (1.676 m)   Wt 83.7 kg (184 lb 8.4 oz)   BMI 29.78 kg/m² "     PHYSICAL EXAMINATION:    General appearance: Well appearing, in no acute distress, alert and oriented x3.  Psych:  Mood and affect appropriate.  Skin: Skin color, texture, turgor normal, no rashes or lesions, in both upper and lower body.  Head/face:  Normocephalic, atraumatic. No palpable lymph nodes.  Neck: No pain to palpation over the cervical paraspinous muscles. Spurling Negative. No pain with neck flexion, extension, or lateral flexion.   Cor: RRR  Pulm: CTA  GI:  Soft and non-tender.  Back: Straight leg raising positive for radicular pain. No pain to palpation over the spine or costovertebral angles. Normal range of motion without pain reproduction.   Extremities: Peripheral joint ROM is full and pain free without obvious instability or laxity in all four extremities. No deformities, edema, or skin discoloration. Good capillary refill.  Musculoskeletal: Shoulder, hip, sacroiliac and knee provocative maneuvers are negative. Bilateral upper and lower extremity strength is normal and symmetric.  No atrophy or tone abnormalities are noted. TTP over posterior thigh and left piriformis.   Neuro: Bilateral upper and lower extremity coordination and muscle stretch reflexes are physiologic and symmetric.  Plantar response are downgoing. Diminished sensation along left lower leg and foot.   Gait: normal.    ASSESSMENT: 83 y.o. year old female with left buttock and leg pain, consistent with      1. Renal mass  CT Abdomen Pelvis With IV Contrast Routine Oral Contrast    Creatinine, serum    diazePAM (VALIUM) 2 MG tablet      2. Lumbar radiculopathy, chronic  Ambulatory referral/consult to Pain Clinic    diazePAM (VALIUM) 2 MG tablet      3. Dorsalgia, unspecified  MRI Lumbar Spine Without Contrast    MRI Lumbar Spine Without Contrast    diazePAM (VALIUM) 2 MG tablet            PLAN:   We discussed with the patient the assessment and recommendations. The following is the plan we agreed on:   - Reviewed history and  imaging of patient.   - Continue PT for continued strength and stability.   - Scheduled patient for open MRI of lumbar spine given history of anxiety/claustrophobia. Will give Valium 2mg for MRI.   - Scheduled CT AP w/ contrast due to renal mass noted on previous imaging.  - Continue gabapentin and robaxin for pain.  - RTC 2 weeks after above imaging done.   - Counseled patient regarding the importance of activity modification and physical therapy.    The above plan and management options were discussed at length with patient. Patient is in agreement with the above and verbalized understanding. It will be communicated with the referring physician via electronic record, fax, or mail.    Joel Foley  05/22/2024  I have personally taken the history and examined this patient and agree with the resident's note as stated above.

## 2024-05-23 ENCOUNTER — TELEPHONE (OUTPATIENT)
Dept: INTERNAL MEDICINE | Facility: CLINIC | Age: 84
End: 2024-05-23
Payer: MEDICARE

## 2024-05-23 RX ORDER — ERGOCALCIFEROL 1.25 MG/1
50000 CAPSULE ORAL
Qty: 12 CAPSULE | Refills: 3 | Status: SHIPPED | OUTPATIENT
Start: 2024-05-23

## 2024-05-23 NOTE — TELEPHONE ENCOUNTER
----- Message from Oly Roque MD sent at 5/23/2024  7:27 AM CDT -----  Please call patient and let her know vitamin D  level was low, sent a vitamin D prescription to pharmacy to start taking weekly. Cholesterol was high, needs to make sure she's taking the atorvastatin. A1c is stable. Thank you!

## 2024-05-31 ENCOUNTER — HOSPITAL ENCOUNTER (OUTPATIENT)
Dept: CARDIOLOGY | Facility: OTHER | Age: 84
Discharge: HOME | End: 2024-05-31
Attending: STUDENT IN AN ORGANIZED HEALTH CARE EDUCATION/TRAINING PROGRAM
Payer: MEDICARE

## 2024-05-31 VITALS
BODY MASS INDEX: 29.73 KG/M2 | HEIGHT: 66 IN | WEIGHT: 185 LBS | DIASTOLIC BLOOD PRESSURE: 82 MMHG | SYSTOLIC BLOOD PRESSURE: 138 MMHG

## 2024-05-31 DIAGNOSIS — R01.1 NEWLY RECOGNIZED HEART MURMUR: ICD-10-CM

## 2024-05-31 DIAGNOSIS — R53.83 FATIGUE, UNSPECIFIED TYPE: ICD-10-CM

## 2024-05-31 LAB
AORTIC ROOT ANNULUS: 2.74 CM
ASCENDING AORTA: 2.77 CM
AV INDEX (PROSTH): 0.46
AV MEAN GRADIENT: 9 MMHG
AV PEAK GRADIENT: 17 MMHG
AV VALVE AREA BY VELOCITY RATIO: 0.92 CM²
AV VALVE AREA: 1.08 CM²
AV VELOCITY RATIO: 0.39
BSA FOR ECHO PROCEDURE: 1.98 M2
CV ECHO LV RWT: 0.71 CM
DOP CALC AO PEAK VEL: 2.09 M/S
DOP CALC AO VTI: 39.7 CM
DOP CALC LVOT AREA: 2.4 CM2
DOP CALC LVOT DIAMETER: 1.74 CM
DOP CALC LVOT PEAK VEL: 0.81 M/S
DOP CALC LVOT STROKE VOLUME: 43.02 CM3
DOP CALCLVOT PEAK VEL VTI: 18.1 CM
E WAVE DECELERATION TIME: 266.79 MSEC
E/A RATIO: 0.55
E/E' RATIO: 7.13 M/S
ECHO LV POSTERIOR WALL: 1.32 CM (ref 0.6–1.1)
FRACTIONAL SHORTENING: 33 % (ref 28–44)
GLOBAL LONGITUIDAL STRAIN: 14 %
INTERVENTRICULAR SEPTUM: 1.22 CM (ref 0.6–1.1)
IVC DIAMETER: 1.64 CM
LA MAJOR: 5.56 CM
LA MINOR: 5.43 CM
LA WIDTH: 3.2 CM
LAAS-AP2: 17 CM2
LAAS-AP4: 20 CM2
LEFT ATRIUM SIZE: 3.12 CM
LEFT ATRIUM VOLUME INDEX MOD: 25.3 ML/M2
LEFT ATRIUM VOLUME INDEX: 24.2 ML/M2
LEFT ATRIUM VOLUME MOD: 48.92 CM3
LEFT ATRIUM VOLUME: 46.63 CM3
LEFT INTERNAL DIMENSION IN SYSTOLE: 2.47 CM (ref 2.1–4)
LEFT VENTRICLE DIASTOLIC VOLUME INDEX: 30.19 ML/M2
LEFT VENTRICLE DIASTOLIC VOLUME: 58.27 ML
LEFT VENTRICLE MASS INDEX: 83 G/M2
LEFT VENTRICLE SYSTOLIC VOLUME INDEX: 11.3 ML/M2
LEFT VENTRICLE SYSTOLIC VOLUME: 21.72 ML
LEFT VENTRICULAR INTERNAL DIMENSION IN DIASTOLE: 3.7 CM (ref 3.5–6)
LEFT VENTRICULAR MASS: 160.61 G
LV LATERAL E/E' RATIO: 6.33 M/S
LV SEPTAL E/E' RATIO: 8.14 M/S
LVOT MG: 1.69 MMHG
LVOT MV: 0.61 CM/S
MV PEAK A VEL: 1.03 M/S
MV PEAK E VEL: 0.57 M/S
MV STENOSIS PRESSURE HALF TIME: 77.37 MS
MV VALVE AREA P 1/2 METHOD: 2.84 CM2
OHS CV RV/LV RATIO: 0.49 CM
PV MV: 0.69 M/S
PV PEAK GRADIENT: 4 MMHG
PV PEAK VELOCITY: 0.98 M/S
RA MAJOR: 3.86 CM
RA PRESSURE ESTIMATED: 3 MMHG
RA WIDTH: 3 CM
RIGHT VENTRICULAR END-DIASTOLIC DIMENSION: 1.83 CM
SINUS: 2.7 CM
STJ: 2.48 CM
TDI LATERAL: 0.09 M/S
TDI SEPTAL: 0.07 M/S
TDI: 0.08 M/S
TRICUSPID ANNULAR PLANE SYSTOLIC EXCURSION: 1.7 CM
Z-SCORE OF LEFT VENTRICULAR DIMENSION IN END DIASTOLE: -3.9
Z-SCORE OF LEFT VENTRICULAR DIMENSION IN END SYSTOLE: -2.44

## 2024-05-31 PROCEDURE — 93356 MYOCRD STRAIN IMG SPCKL TRCK: CPT

## 2024-05-31 PROCEDURE — 93356 MYOCRD STRAIN IMG SPCKL TRCK: CPT | Mod: ,,, | Performed by: INTERNAL MEDICINE

## 2024-05-31 PROCEDURE — 93306 TTE W/DOPPLER COMPLETE: CPT | Mod: 26,,, | Performed by: INTERNAL MEDICINE

## 2024-06-03 DIAGNOSIS — I35.0 AORTIC VALVE STENOSIS, ETIOLOGY OF CARDIAC VALVE DISEASE UNSPECIFIED: Primary | ICD-10-CM

## 2024-06-04 ENCOUNTER — TELEPHONE (OUTPATIENT)
Dept: INTERNAL MEDICINE | Facility: CLINIC | Age: 84
End: 2024-06-04
Payer: MEDICARE

## 2024-06-04 ENCOUNTER — TELEPHONE (OUTPATIENT)
Dept: PAIN MEDICINE | Facility: CLINIC | Age: 84
End: 2024-06-04
Payer: MEDICARE

## 2024-06-04 NOTE — TELEPHONE ENCOUNTER
Staff spoke with patient and got her scheduled for a f/u after her imaging. Patient is in agreement to the above and verbalized understanding.

## 2024-06-04 NOTE — TELEPHONE ENCOUNTER
----- Message from Oly Roque MD sent at 6/3/2024  6:22 PM CDT -----  Please assist patient with scheduling  a cardiology appt, thank you!

## 2024-06-04 NOTE — TELEPHONE ENCOUNTER
Pt was informed of lab results Hello,     Your provider has placed an order for a referral to ***.    You may have access to schedule this referral through the link on your [facility] portal, provided on the Home page. A referral coordinator will reach out to you to help schedule this appointment.    Should you need assistance with scheduling, you can call the main line at [phone]. Our scheduling specialists will be able to help you coordinate your appointment.     Let us know if there is more we can do to help you.     Thank you choosing [facility], [facility] was

## 2024-06-06 ENCOUNTER — PATIENT MESSAGE (OUTPATIENT)
Dept: PAIN MEDICINE | Facility: OTHER | Age: 84
End: 2024-06-06
Payer: MEDICARE

## 2024-06-06 ENCOUNTER — TELEPHONE (OUTPATIENT)
Dept: PAIN MEDICINE | Facility: CLINIC | Age: 84
End: 2024-06-06

## 2024-06-06 ENCOUNTER — OFFICE VISIT (OUTPATIENT)
Dept: PAIN MEDICINE | Facility: CLINIC | Age: 84
End: 2024-06-06
Attending: ANESTHESIOLOGY
Payer: MEDICARE

## 2024-06-06 VITALS
OXYGEN SATURATION: 98 % | WEIGHT: 177.25 LBS | BODY MASS INDEX: 28.61 KG/M2 | SYSTOLIC BLOOD PRESSURE: 164 MMHG | DIASTOLIC BLOOD PRESSURE: 88 MMHG | TEMPERATURE: 99 F | RESPIRATION RATE: 18 BRPM | HEART RATE: 71 BPM

## 2024-06-06 DIAGNOSIS — M54.16 LUMBAR RADICULOPATHY: Primary | ICD-10-CM

## 2024-06-06 DIAGNOSIS — G57.02 PIRIFORMIS SYNDROME OF LEFT SIDE: ICD-10-CM

## 2024-06-06 DIAGNOSIS — M51.36 DDD (DEGENERATIVE DISC DISEASE), LUMBAR: ICD-10-CM

## 2024-06-06 DIAGNOSIS — M70.72 ISCHIAL BURSITIS OF LEFT SIDE: ICD-10-CM

## 2024-06-06 DIAGNOSIS — S76.312A STRAIN OF LEFT HAMSTRING, INITIAL ENCOUNTER: ICD-10-CM

## 2024-06-06 PROCEDURE — 1160F RVW MEDS BY RX/DR IN RCRD: CPT | Mod: CPTII,S$GLB,, | Performed by: ANESTHESIOLOGY

## 2024-06-06 PROCEDURE — 1125F AMNT PAIN NOTED PAIN PRSNT: CPT | Mod: CPTII,S$GLB,, | Performed by: ANESTHESIOLOGY

## 2024-06-06 PROCEDURE — 99213 OFFICE O/P EST LOW 20 MIN: CPT | Mod: GC,S$GLB,, | Performed by: ANESTHESIOLOGY

## 2024-06-06 PROCEDURE — 3288F FALL RISK ASSESSMENT DOCD: CPT | Mod: CPTII,S$GLB,, | Performed by: ANESTHESIOLOGY

## 2024-06-06 PROCEDURE — 3079F DIAST BP 80-89 MM HG: CPT | Mod: CPTII,S$GLB,, | Performed by: ANESTHESIOLOGY

## 2024-06-06 PROCEDURE — 99999 PR PBB SHADOW E&M-EST. PATIENT-LVL IV: CPT | Mod: PBBFAC,,, | Performed by: ANESTHESIOLOGY

## 2024-06-06 PROCEDURE — 1159F MED LIST DOCD IN RCRD: CPT | Mod: CPTII,S$GLB,, | Performed by: ANESTHESIOLOGY

## 2024-06-06 PROCEDURE — 1101F PT FALLS ASSESS-DOCD LE1/YR: CPT | Mod: CPTII,S$GLB,, | Performed by: ANESTHESIOLOGY

## 2024-06-06 PROCEDURE — 3077F SYST BP >= 140 MM HG: CPT | Mod: CPTII,S$GLB,, | Performed by: ANESTHESIOLOGY

## 2024-06-06 RX ORDER — PREGABALIN 50 MG/1
50 CAPSULE ORAL 2 TIMES DAILY
Qty: 60 CAPSULE | Refills: 2 | Status: CANCELLED | OUTPATIENT
Start: 2024-06-06 | End: 2024-09-04

## 2024-06-06 RX ORDER — METHOCARBAMOL 750 MG/1
750 TABLET, FILM COATED ORAL 2 TIMES DAILY PRN
Qty: 15 TABLET | Refills: 0 | Status: SHIPPED | OUTPATIENT
Start: 2024-06-06

## 2024-06-06 NOTE — H&P (VIEW-ONLY)
"Chronic Pain - Established Patient    Referring Physician: Mildred Medina PA-C     Chief Complaint:   Chief Complaint   Patient presents with    Foot Pain    Leg Pain        SUBJECTIVE:    Interval HPI 6/6/24:  Karis Briceño returns to clinic for follow up. [facility] ordered open MRI and CT abd and pelvis. Continued with PT and on gabapentin and robaxin for pain. Today reports pain that localizes to her left posterior hamstring with occasional back pain and left buttock pain with associated numbness on the top and bottom of her left foot. She also reports some "locking". When she ADF and APF her foot, she feels pain in her posterior hamstring. She ambulates with a cane and reports some weakness in her LLE. Pain is a 4/10. Made worse by walking. Improves with sitting and rest. She reports offloading her LLE. She reports stopping gabapentin. She reports some sleepiness in the afternoon. She ran out of robaxin. She feels that this helped. She has stopped going to PT due to foot numbness.     Initial HPI 5/22/24:  Karis Briceño presents to the clinic for the evaluation of left buttock and thigh pain. The pain started 3 weeks ago without inciting event and symptoms have been unchanged.The pain is located in the left buttock and posterior thigh area and radiates to the left knee.  The pain is described as sharp and tingling and is rated as 7/10. The pain is rated with a score of  5/10 on the BEST day and a score of 8/10 on the WORST day.  Symptoms interfere with daily activity. The pain is exacerbated by Standing, Touching, Walking, and Extension.  The pain is mitigated by medications. She reports paraesthesia along her lateral LLE and lateral foot. She takes Gabapentin 300mg morning and 600mg QHS and BC powder with some relief.  She went to the ED 3 weeks ago and received Steroid injection, toradol injection, lidocaine patch, and Robaxin. Had previous Right L4/L5 TF PRASAD cancelled in 11/2023 due to anxiousness about " procedure.    Patient denies night fever/night sweats, urinary incontinence, bowel incontinence, significant weight loss, significant motor weakness.    Physical Therapy/Home Exercise: no      Pain Disability Index Review:      5/22/2024     9:48 AM   Last 3 PDI Scores   Pain Disability Index (PDI) 42       Pain Medications:  Gabapentin  BC arthritis powder     report:  Reviewed and consistent with medication use as prescribed.    Pain Procedures:   None    Imaging:   CT Lumbar spine FINDINGS: 9/2023  Alignment: Grade 1 anterolisthesis of L3 on L4.     Vertebrae: No acute fracture.  Multilevel degenerative endplate changes.  No suspicious lytic or blastic lesions.     Discs: Degenerative disc space narrowing extending from L2-L3 through L5-S1.  Vacuum phenomenon at L2-L3, L4-L5 and L5-S1.  No endplate erosion.     Sacroiliac joints: Symmetric degenerative changes.     Degenerative findings:     T12-L1: No spinal canal stenosis.  No neural foraminal narrowing.     L1-L2: No spinal canal stenosis.  No neural foraminal narrowing     L2-L3: Circumferential disc bulge.  Bilateral facet arthropathy, bilateral ligamentum flavum hypertrophy and prominent posterior epidural fat.  Moderate spinal canal and lateral recess stenosis.  Moderate right and mild left neural foraminal narrowing.     L3-L4: Grade 1 anterolisthesis with uncovering the intervertebral disc.  Circumferential disc bulge.  Bilateral facet arthropathy, bilateral ligamentum flavum hypertrophy and prominent posterior epidural fat.  Severe spinal canal and lateral recess stenosis.  Moderate bilateral neural foraminal narrowing.     L4-L5: Circumferential disc bulge with a superimposed posterior central protrusion.  Bilateral facet arthropathy, bilateral ligamentum flavum hypertrophy and prominent posterior epidural fat.  Severe spinal canal and lateral recess stenosis.  Severe left and moderate to severe right neural foraminal narrowing.     L5-S1:  Circumferential disc bulge.  Bilateral facet arthropathy, bilateral ligamentum flavum hypertrophy and prominent posterior epidural fat.  Moderate spinal canal stenosis.  Severe bilateral neural foraminal narrowing.     Paraspinal muscles & soft tissues: Multiple calcified gallstones.  0.7 cm calcified left renal stone.  0.9 cm left renal cortical lesion that demonstrates attenuation values higher than that of a simple cyst.  Trace atherosclerotic calcification of the abdominal aorta and iliac arteries.  Mild fatty degeneration of the posterior paraspinal musculature.     Impression:  Advanced lumbar spondylosis most severe from L2-L3 through L5-S1 as detailed above.  Cholelithiasis.  Left nonobstructing nephrolithiasis.  0.9 cm left renal cortical lesion that does not meet CT criteria for a simple cyst.  Recommend further characterization with a dedicated renal ultrasound.    LXR FINDINGS: 7/2023  No evidence of an acute fracture or dislocation.  Alignment is maintained.  Intervertebral disc height loss worse at inferior lumbar levels.     Impression:     Degenerative change at inferior lumbar levels without instability.    Past Medical History:   Diagnosis Date    Breast cancer 12/20/2021    Left breast IDC    Gallstone pancreatitis 09/2016    Gallstones     Hyperlipidemia     Hypertension      Past Surgical History:   Procedure Laterality Date    BREAST BIOPSY Bilateral 12/20/2021    Right side benign, Left side IDC    HYSTERECTOMY      BROOKS 3, AUB     Social History     Socioeconomic History    Marital status: Single   Tobacco Use    Smoking status: Never    Smokeless tobacco: Never   Substance and Sexual Activity    Alcohol use: Yes     Comment: socially    Drug use: No    Sexual activity: Not Currently     Family History   Problem Relation Name Age of Onset    No Known Problems Mother      Heart attack Father      Coronary artery disease Father      Coronary artery disease Sister      Heart attack Sister       Skin cancer Neg Hx      Colon cancer Neg Hx      Breast cancer Neg Hx      Ovarian cancer Neg Hx         Review of patient's allergies indicates:  No Known Allergies    Current Outpatient Medications   Medication Sig    amLODIPine (NORVASC) 10 MG tablet Take 1 tablet (10 mg total) by mouth once daily.    anastrozole (ARIMIDEX) 1 mg Tab Take 1 tablet (1 mg total) by mouth once daily.    aspirin (ECOTRIN) 81 MG EC tablet Take 1 tablet (81 mg total) by mouth once daily. (Patient not taking: Reported on 6/23/2022)    atorvastatin (LIPITOR) 40 MG tablet Take 1 tablet (40 mg total) by mouth once daily.    carvediloL (COREG) 3.125 MG tablet Take 1 tablet (3.125 mg total) by mouth 2 (two) times daily with meals.    diazePAM (VALIUM) 2 MG tablet On call to MRI may repeat X 3 do not drive to or from MRI/procedure & for 24 hours    diclofenac sodium (VOLTAREN) 1 % Gel Apply 2 g topically 4 (four) times daily as needed.    ergocalciferol (ERGOCALCIFEROL) 50,000 unit Cap Take 1 capsule (50,000 Units total) by mouth every 7 days.    fish oil-omega-3 fatty acids 300-1,000 mg capsule Take 1 capsule by mouth once daily.    hydroCHLOROthiazide (HYDRODIURIL) 25 MG tablet Take 1 tablet (25 mg total) by mouth once daily.    ketorolac (TORADOL) 10 mg tablet Take 1 tablet (10 mg total) by mouth every 6 (six) hours as needed for Pain.    LIDOcaine (LIDODERM) 5 % Place 1 patch onto the skin once daily. Remove & Discard patch within 12 hours or as directed by MD    methocarbamoL (ROBAXIN) 750 MG Tab Take 1 tablet (750 mg total) by mouth 2 (two) times daily as needed (muscle spasm/back pain).    valsartan (DIOVAN) 320 MG tablet Take 1 tablet (320 mg total) by mouth once daily.     No current facility-administered medications for this visit.       REVIEW OF SYSTEMS:    GENERAL:  No weight loss, malaise or fevers.  HEENT:  Negative for frequent or significant headaches.  NECK:  Negative for lumps, goiter, pain and significant neck  swelling.  RESPIRATORY:  Negative for cough, wheezing or shortness of breath.  CARDIOVASCULAR:  Negative for chest pain, leg swelling or palpitations.  GI:  Negative for abdominal discomfort, blood in stools or black stools or change in bowel habits.  MUSCULOSKELETAL:  See HPI. Left buttock pain and posterior leg pain.  SKIN:  Negative for lesions, rash, and itching.  PSYCH:  Negative for sleep disturbance, mood disorder and recent psychosocial stressors.  HEMATOLOGY/LYMPHOLOGY:  Negative for prolonged bleeding, bruising easily or swollen nodes.  NEURO:   No history of headaches, syncope, paralysis, seizures or tremors. Radiating pain down to left knee. Paraesthesia in lateral leg and foot.  All other reviewed and negative other than HPI.    OBJECTIVE:    BP (!) 164/88   Pulse 71   Temp 98.5 °F (36.9 °C)   Resp 18   Wt 80.4 kg (177 lb 4 oz)   SpO2 98%   BMI 28.61 kg/m²     PHYSICAL EXAMINATION:    General appearance: Well appearing, in no acute distress, alert and oriented x3.  Psych:  Mood and affect appropriate.  Skin: Skin color, texture, turgor normal, no rashes or lesions, in both upper and lower body.  Head/face:  Normocephalic, atraumatic. No palpable lymph nodes.  Neck: No pain to palpation over the cervical paraspinous muscles. Spurling Negative. No pain with neck flexion, extension, or lateral flexion.   Cor: RRR  Pulm: CTA  GI:  Soft and non-tender.  Back: Straight leg raising positive for radicular pain on the left. No pain to palpation over the spine or costovertebral angles. Left posterior leg pain with flexion  Extremities: Peripheral joint ROM is full and pain free without obvious instability or laxity in all four extremities. No deformities, edema, or skin discoloration. Good capillary refill.  Musculoskeletal: EARLENE and FADIR negative BL. FAIR positive on the left. EHL and ADF > 2 (unable to reach antigravity, but 4/10 resistance within aROM) and APF 4/5. Otherwise 5/5 throughout BL LE  No  atrophy or tone abnormalities are noted. TTP over posterior thigh and left piriformis, left ischial bursa, and left GTB  Neuro: 2+ BL patellar reflexes. No clonus. Diminished sensation along left lower leg and foot.   Gait: normal.    Imaging: Reviewed patient MRI report. Demonstrated lumbar spondylosis and multilevel disc disease with multilevel foraminal stenosis and spinal stenosis Spinal stenosis most severe at L4/5. There is left L5/S1 paracentral disc herniation with severe left L5/S1 foraminal stenosis and displacement of the descending left S1 nerve root.     ASSESSMENT: 83 y.o. year old female with primarily left posterior leg pain over the hamstring with involvement of the low back and left buttock and paresthesia of her left foot that is consistent with lumbar radiculopathy. Exam was notable for positive SLR on the left, weakness in an L4 - S1 distribution. Presentation is concordant with MRI  L spine that demonstrated left L5/S1 paracentral disc herniation with severe left L5/S1 foraminal stenosis and displacement of the descending left S1 nerve root. Hamstring strain is on the differential due to focal tenderness over her site of maximal pain.     1. Lumbar radiculopathy  methocarbamoL (ROBAXIN) 750 MG Tab    Procedure Order to Pain Management      2. Piriformis syndrome of left side  methocarbamoL (ROBAXIN) 750 MG Tab      3. Strain of left hamstring, initial encounter  methocarbamoL (ROBAXIN) 750 MG Tab      4. Ischial bursitis of left side  methocarbamoL (ROBAXIN) 750 MG Tab          PLAN:   We discussed with the patient the assessment and recommendations. The following is the plan we agreed on:   - Reviewed history and imaging of patient.   - Schedule left L5/S1 and Left S1 TFESI  - Continue PT for continued strength and stability. Encouraged patient not to skip therapy  - Counseled patient to bring MRI disc into clinic  - Gabapentin, counseled to increase to 300-300-600  - Robaxin 750mg BID PRN  refill  - RTC for left L5/S1 and S1 TFESI   - Counseled patient regarding the importance of activity modification and physical therapy.    Lui Guerra MD  [facility] PM&R Resident     The above plan and management options were discussed at length with patient. Patient is in agreement with the above and verbalized understanding. It will be communicated with the referring physician via electronic record, fax, or mail.    I have personally taken the history and examined this patient and agree with the resident's note as stated above.

## 2024-06-11 ENCOUNTER — TELEPHONE (OUTPATIENT)
Dept: PAIN MEDICINE | Facility: CLINIC | Age: 84
End: 2024-06-11
Payer: MEDICARE

## 2024-06-11 ENCOUNTER — OFFICE VISIT (OUTPATIENT)
Dept: CARDIOLOGY | Facility: CLINIC | Age: 84
End: 2024-06-11
Payer: MEDICARE

## 2024-06-11 VITALS
BODY MASS INDEX: 28.91 KG/M2 | DIASTOLIC BLOOD PRESSURE: 62 MMHG | WEIGHT: 179.13 LBS | SYSTOLIC BLOOD PRESSURE: 134 MMHG | OXYGEN SATURATION: 98 % | HEART RATE: 68 BPM

## 2024-06-11 DIAGNOSIS — E78.00 HYPERCHOLESTEROLEMIA: ICD-10-CM

## 2024-06-11 DIAGNOSIS — I35.0 NONRHEUMATIC AORTIC VALVE STENOSIS: Primary | ICD-10-CM

## 2024-06-11 DIAGNOSIS — I10 PRIMARY HYPERTENSION: ICD-10-CM

## 2024-06-11 PROCEDURE — 93010 ELECTROCARDIOGRAM REPORT: CPT | Mod: S$GLB,,, | Performed by: INTERNAL MEDICINE

## 2024-06-11 PROCEDURE — 99204 OFFICE O/P NEW MOD 45 MIN: CPT | Mod: 25,S$GLB,, | Performed by: INTERNAL MEDICINE

## 2024-06-11 PROCEDURE — 1126F AMNT PAIN NOTED NONE PRSNT: CPT | Mod: CPTII,S$GLB,, | Performed by: INTERNAL MEDICINE

## 2024-06-11 PROCEDURE — 1159F MED LIST DOCD IN RCRD: CPT | Mod: CPTII,S$GLB,, | Performed by: INTERNAL MEDICINE

## 2024-06-11 PROCEDURE — 1101F PT FALLS ASSESS-DOCD LE1/YR: CPT | Mod: CPTII,S$GLB,, | Performed by: INTERNAL MEDICINE

## 2024-06-11 PROCEDURE — 99999 PR PBB SHADOW E&M-EST. PATIENT-LVL V: CPT | Mod: PBBFAC,,, | Performed by: INTERNAL MEDICINE

## 2024-06-11 PROCEDURE — 93005 ELECTROCARDIOGRAM TRACING: CPT

## 2024-06-11 PROCEDURE — 3078F DIAST BP <80 MM HG: CPT | Mod: CPTII,S$GLB,, | Performed by: INTERNAL MEDICINE

## 2024-06-11 PROCEDURE — 3075F SYST BP GE 130 - 139MM HG: CPT | Mod: CPTII,S$GLB,, | Performed by: INTERNAL MEDICINE

## 2024-06-11 PROCEDURE — 3288F FALL RISK ASSESSMENT DOCD: CPT | Mod: CPTII,S$GLB,, | Performed by: INTERNAL MEDICINE

## 2024-06-11 RX ORDER — GABAPENTIN 300 MG/1
300 CAPSULE ORAL 3 TIMES DAILY
COMMUNITY

## 2024-06-11 NOTE — PROGRESS NOTES
[facility]    Chief Complaint  Chief Complaint   Patient presents with    Valvular Heart Disease       HPI:    Patient was recently noted to have a cardiac murmur.  Echocardiogram was performed which showed aortic stenosis.  No prior cardiac problems.  Had a stress test about 5 years ago while hospitalized.  It was normal.  She teaches civics.  She is active without exertional dyspnea or chest discomfort.  Recent activities been limited by sciatica.  Plans for a back injection.    Medications  Current Outpatient Medications   Medication Sig Dispense Refill    amLODIPine (NORVASC) 10 MG tablet Take 1 tablet (10 mg total) by mouth once daily. 90 tablet 3    anastrozole (ARIMIDEX) 1 mg Tab Take 1 tablet (1 mg total) by mouth once daily. 30 tablet 11    atorvastatin (LIPITOR) 40 MG tablet Take 1 tablet (40 mg total) by mouth once daily. 90 tablet 3    carvediloL (COREG) 3.125 MG tablet Take 1 tablet (3.125 mg total) by mouth 2 (two) times daily with meals. 180 tablet 3    diclofenac sodium (VOLTAREN) 1 % Gel Apply 2 g topically 4 (four) times daily as needed. 100 g 11    ergocalciferol (ERGOCALCIFEROL) 50,000 unit Cap Take 1 capsule (50,000 Units total) by mouth every 7 days. 12 capsule 3    fish oil-omega-3 fatty acids 300-1,000 mg capsule Take 1 capsule by mouth once daily.      gabapentin (NEURONTIN) 300 MG capsule Take 300 mg by mouth 3 (three) times daily.      hydroCHLOROthiazide (HYDRODIURIL) 25 MG tablet Take 1 tablet (25 mg total) by mouth once daily. 90 tablet 3    LIDOcaine (LIDODERM) 5 % Place 1 patch onto the skin once daily. Remove & Discard patch within 12 hours or as directed by MD 15 patch 0    methocarbamoL (ROBAXIN) 750 MG Tab Take 1 tablet (750 mg total) by mouth 2 (two) times daily as needed (muscle spasm/back pain). 15 tablet 0    valsartan (DIOVAN) 320 MG tablet Take 1 tablet (320 mg total) by mouth once daily. 90 tablet 3    aspirin (ECOTRIN) 81 MG EC tablet Take 1 tablet (81 mg  total) by mouth once daily. (Patient not taking: Reported on 6/23/2022) 90 tablet 3    diazePAM (VALIUM) 2 MG tablet On call to MRI may repeat X 3 do not drive to or from MRI/procedure & for 24 hours (Patient not taking: Reported on 6/11/2024) 3 tablet 0    ketorolac (TORADOL) 10 mg tablet Take 1 tablet (10 mg total) by mouth every 6 (six) hours as needed for Pain. (Patient not taking: Reported on 6/11/2024) 10 tablet 0     No current facility-administered medications for this visit.        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    Gallstone pancreatitis 09/2016    Gallstones     Hyperlipidemia     Hypertension      Past Surgical History:   Procedure Laterality Date    BREAST BIOPSY Bilateral 12/20/2021    Right side benign, Left side IDC    HYSTERECTOMY      BROOKS 3, AUB     Social History     Socioeconomic History    Marital status: Single   Tobacco Use    Smoking status: Never    Smokeless tobacco: Never   Substance and Sexual Activity    Alcohol use: Yes     Comment: socially    Drug use: No    Sexual activity: Not Currently     Family History   Problem Relation Name Age of Onset    No Known Problems Mother      Heart attack Father      Coronary artery disease Father      Coronary artery disease Sister      Heart attack Sister      Skin cancer Neg Hx      Colon cancer Neg Hx      Breast cancer Neg Hx      Ovarian cancer Neg Hx          Allergies  Review of patient's allergies indicates:  No Known Allergies    Review of Systems   Review of Systems   Constitutional: Negative for malaise/fatigue, weight gain and weight loss.   Eyes:  Negative for visual disturbance.   Cardiovascular:  Negative for chest pain, claudication, cyanosis, dyspnea on exertion, irregular heartbeat, leg swelling, near-syncope, orthopnea, palpitations, paroxysmal nocturnal dyspnea and syncope.   Respiratory:  Negative for cough, hemoptysis, shortness of breath, sleep disturbances due to  breathing and wheezing.    Hematologic/Lymphatic: Negative for bleeding problem. Does not bruise/bleed easily.   Skin:  Negative for poor wound healing.   Musculoskeletal:  Negative for muscle cramps and myalgias.   Gastrointestinal:  Negative for abdominal pain, anorexia, diarrhea, heartburn, hematemesis, hematochezia, melena, nausea and vomiting.   Genitourinary:  Negative for hematuria and nocturia.   Neurological:  Negative for excessive daytime sleepiness, dizziness, focal weakness, light-headedness and weakness.       Physical Exam  Vitals:    06/11/24 0932   BP: 134/62   Pulse: 68     Wt Readings from Last 1 Encounters:   06/11/24 81.2 kg (179 lb 1.6 oz)     Physical Exam  Vitals and nursing note reviewed.   Constitutional:       General: She is not in acute distress.     Appearance: She is not toxic-appearing or diaphoretic.   HENT:      Head: Normocephalic and atraumatic.      Mouth/Throat:      Lips: Pink.      Mouth: Mucous membranes are moist.   Eyes:      General: No scleral icterus.     Conjunctiva/sclera: Conjunctivae normal.   Neck:      Thyroid: No thyromegaly.      Vascular: No carotid bruit, hepatojugular reflux or JVD.      Trachea: Trachea normal.   Cardiovascular:      Rate and Rhythm: Normal rate and regular rhythm. No extrasystoles are present.     Chest Wall: PMI is not displaced.      Pulses:           Carotid pulses are 2+ on the right side and 2+ on the left side.       Radial pulses are 2+ on the right side and 2+ on the left side.        Dorsalis pedis pulses are 2+ on the right side and 2+ on the left side.        Posterior tibial pulses are 2+ on the right side and 2+ on the left side.      Heart sounds: S1 normal and S2 normal. Murmur heard.      Harsh midsystolic murmur is present with a grade of 2/6 at the upper right sternal border radiating to the neck.      No friction rub. No S3 or S4 sounds.   Pulmonary:      Effort: Pulmonary effort is normal. No accessory muscle usage or  respiratory distress.      Breath sounds: Normal breath sounds and air entry. No decreased breath sounds, wheezing, rhonchi or rales.   Abdominal:      General: Bowel sounds are normal. There is no distension or abdominal bruit.      Palpations: Abdomen is soft. There is no hepatomegaly, splenomegaly or pulsatile mass.      Tenderness: There is no abdominal tenderness.   Musculoskeletal:         General: No tenderness or deformity.      Right lower leg: No edema.      Left lower leg: No edema.   Skin:     General: Skin is warm and dry.      Capillary Refill: Capillary refill takes less than 2 seconds.      Coloration: Skin is not cyanotic or pale.      Nails: There is no clubbing.   Neurological:      General: No focal deficit present.      Mental Status: She is alert and oriented to person, place, and time.   Psychiatric:         Attention and Perception: Attention normal.         Mood and Affect: Mood normal.         Speech: Speech normal.         Behavior: Behavior normal. Behavior is cooperative.         Labs  Hospital Outpatient Visit on 05/31/2024   Component Date Value Ref Range Status    BSA 05/31/2024 1.98  m2 Final    TAPSE 05/31/2024 1.70  cm Final    LVOT stroke volume 05/31/2024 43.02  cm3 Final    LVIDd 05/31/2024 3.70  3.5 - 6.0 cm Final    LV Systolic Volume 05/31/2024 21.72  mL Final    LV Systolic Volume Index 05/31/2024 11.3  mL/m2 Final    LVIDs 05/31/2024 2.47  2.1 - 4.0 cm Final    LV Diastolic Volume 05/31/2024 58.27  mL Final    LV Diastolic Volume Index 05/31/2024 30.19  mL/m2 Final    IVS 05/31/2024 1.22 (A)  0.6 - 1.1 cm Final    LVOT diameter 05/31/2024 1.74  cm Final    LVOT area 05/31/2024 2.4  cm2 Final    FS 05/31/2024 33  28 - 44 % Final    Left Ventricle Relative Wall Thick* 05/31/2024 0.71  cm Final    Posterior Wall 05/31/2024 1.32 (A)  0.6 - 1.1 cm Final    LV mass 05/31/2024 160.61  g Final    LV Mass Index 05/31/2024 83  g/m2 Final    MV Peak E Arden 05/31/2024 0.57  m/s Final     TDI LATERAL 05/31/2024 0.09  m/s Final    TDI SEPTAL 05/31/2024 0.07  m/s Final    E/E' ratio 05/31/2024 7.13  m/s Final    MV Peak A Arden 05/31/2024 1.03  m/s Final    E/A ratio 05/31/2024 0.55   Final    E wave deceleration time 05/31/2024 266.79  msec Final    LV SEPTAL E/E' RATIO 05/31/2024 8.14  m/s Final    LV LATERAL E/E' RATIO 05/31/2024 6.33  m/s Final    LVOT peak arden 05/31/2024 0.81  m/s Final    Left Ventricular Outflow Tract Daria* 05/31/2024 0.61  cm/s Final    Left Ventricular Outflow Tract Daria* 05/31/2024 1.69  mmHg Final    RVDD 05/31/2024 1.83  cm Final    RV/LV Ratio 05/31/2024 0.49  cm Final    LA size 05/31/2024 3.12  cm Final    Left Atrium Minor Axis 05/31/2024 5.43  cm Final    Left Atrium Major Axis 05/31/2024 5.56  cm Final    LA volume (mod) 05/31/2024 48.92  cm3 Final    LA Volume Index (Mod) 05/31/2024 25.3  mL/m2 Final    RA Major Axis 05/31/2024 3.86  cm Final    AV mean gradient 05/31/2024 9  mmHg Final    AV peak gradient 05/31/2024 17  mmHg Final    Ao peak arden 05/31/2024 2.09  m/s Final    Ao VTI 05/31/2024 39.70  cm Final    LVOT peak VTI 05/31/2024 18.10  cm Final    AV valve area 05/31/2024 1.08  cm² Final    AV Velocity Ratio 05/31/2024 0.39   Final    AV index (prosthetic) 05/31/2024 0.46   Final    ELENA by Velocity Ratio 05/31/2024 0.92  cm² Final    MV stenosis pressure 1/2 time 05/31/2024 77.37  ms Final    MV valve area p 1/2 method 05/31/2024 2.84  cm2 Final    PV PEAK VELOCITY 05/31/2024 0.98  m/s Final    PV peak gradient 05/31/2024 4  mmHg Final    Pulmonary Valve Mean Velocity 05/31/2024 0.69  m/s Final    Ao root annulus 05/31/2024 2.74  cm Final    STJ 05/31/2024 2.48  cm Final    Ascending aorta 05/31/2024 2.77  cm Final    IVC diameter 05/31/2024 1.64  cm Final    Mean e' 05/31/2024 0.08  m/s Final    ZLVIDS 05/31/2024 -2.44   Final    ZLVIDD 05/31/2024 -3.90   Final    LA Volume Index 05/31/2024 24.2  mL/m2 Final    LA volume 05/31/2024 46.63  cm3 Final    LA WIDTH  05/31/2024 3.2  cm Final    Left Atrium Area-systolic Apical T* 05/31/2024 17.0  cm2 Final    Left Atrium Area-systolic Four Mona* 05/31/2024 20.0  cm2 Final    RA Width 05/31/2024 3.0  cm Final    Sinus 05/31/2024 2.7  cm Final    Est. RA pres 05/31/2024 3  mmHg Final    GLS 05/31/2024 14.0  % Final   Lab Visit on 05/22/2024   Component Date Value Ref Range Status    Microalbumin, Urine 05/22/2024 17.0  ug/mL Final    Creatinine, Urine 05/22/2024 133.0  15.0 - 325.0 mg/dL Final    Microalb/Creat Ratio 05/22/2024 12.8  0.0 - 30.0 ug/mg Final   Lab Visit on 05/22/2024   Component Date Value Ref Range Status    Sodium 05/22/2024 142  136 - 145 mmol/L Final    Potassium 05/22/2024 3.8  3.5 - 5.1 mmol/L Final    Chloride 05/22/2024 103  95 - 110 mmol/L Final    CO2 05/22/2024 26  23 - 29 mmol/L Final    Glucose 05/22/2024 121 (H)  70 - 110 mg/dL Final    BUN 05/22/2024 26 (H)  8 - 23 mg/dL Final    Creatinine 05/22/2024 1.1  0.5 - 1.4 mg/dL Final    Calcium 05/22/2024 9.6  8.7 - 10.5 mg/dL Final    Total Protein 05/22/2024 7.5  6.0 - 8.4 g/dL Final    Albumin 05/22/2024 3.7  3.5 - 5.2 g/dL Final    Total Bilirubin 05/22/2024 0.7  0.1 - 1.0 mg/dL Final    Comment: For infants and newborns, interpretation of results should be based  on gestational age, weight and in agreement with clinical  observations.    Premature Infant recommended reference ranges:  Up to 24 hours.............<8.0 mg/dL  Up to 48 hours............<12.0 mg/dL  3-5 days..................<15.0 mg/dL  6-29 days.................<15.0 mg/dL      Alkaline Phosphatase 05/22/2024 74  55 - 135 U/L Final    AST 05/22/2024 24  10 - 40 U/L Final    ALT 05/22/2024 22  10 - 44 U/L Final    eGFR 05/22/2024 50 (A)  >60 mL/min/1.73 m^2 Final    Anion Gap 05/22/2024 13  8 - 16 mmol/L Final    TSH 05/22/2024 0.772  0.400 - 4.000 uIU/mL Final    Cholesterol 05/22/2024 220 (H)  120 - 199 mg/dL Final    Comment: The National Cholesterol Education Program (NCEP) has set  the  following guidelines (reference ranges) for Cholesterol:  Optimal.....................<200 mg/dL  Borderline High.............200-239 mg/dL  High........................> or = 240 mg/dL      Triglycerides 05/22/2024 119  30 - 150 mg/dL Final    Comment: The National Cholesterol Education Program (NCEP) has set the  following guidelines (reference values) for triglycerides:  Normal......................<150 mg/dL  Borderline High.............150-199 mg/dL  High........................200-499 mg/dL      HDL 05/22/2024 66  40 - 75 mg/dL Final    Comment: The National Cholesterol Education Program (NCEP) has set the  following guidelines (reference values) for HDL Cholesterol:  Low...............<40 mg/dL  Optimal...........>60 mg/dL      LDL Cholesterol 05/22/2024 130.2  63.0 - 159.0 mg/dL Final    Comment: The National Cholesterol Education Program (NCEP) has set the  following guidelines (reference values) for LDL Cholesterol:  Optimal.......................<130 mg/dL  Borderline High...............130-159 mg/dL  High..........................160-189 mg/dL  Very High.....................>190 mg/dL      HDL/Cholesterol Ratio 05/22/2024 30.0  20.0 - 50.0 % Final    Total Cholesterol/HDL Ratio 05/22/2024 3.3  2.0 - 5.0 Final    Non-HDL Cholesterol 05/22/2024 154  mg/dL Final    Comment: Risk category and Non-HDL cholesterol goals:  Coronary heart disease (CHD)or equivalent (10-year risk of CHD >20%):  Non-HDL cholesterol goal     <130 mg/dL  Two or more CHD risk factors and 10-year risk of CHD <= 20%:  Non-HDL cholesterol goal     <160 mg/dL  0 to 1 CHD risk factor:  Non-HDL cholesterol goal     <190 mg/dL      Hemoglobin A1C 05/22/2024 6.4 (H)  4.0 - 5.6 % Final    Comment: ADA Screening Guidelines:  5.7-6.4%  Consistent with prediabetes  >or=6.5%  Consistent with diabetes    High levels of fetal hemoglobin interfere with the HbA1C  assay. Heterozygous hemoglobin variants (HbS, HgC, etc)do  not significantly  interfere with this assay.   However, presence of multiple variants may affect accuracy.      Estimated Avg Glucose 05/22/2024 137 (H)  68 - 131 mg/dL Final    WBC 05/22/2024 6.88  3.90 - 12.70 K/uL Final    RBC 05/22/2024 4.79  4.00 - 5.40 M/uL Final    Hemoglobin 05/22/2024 14.8  12.0 - 16.0 g/dL Final    Hematocrit 05/22/2024 45.2  37.0 - 48.5 % Final    MCV 05/22/2024 94  82 - 98 fL Final    MCH 05/22/2024 30.9  27.0 - 31.0 pg Final    MCHC 05/22/2024 32.7  32.0 - 36.0 g/dL Final    RDW 05/22/2024 12.7  11.5 - 14.5 % Final    Platelets 05/22/2024 232  150 - 450 K/uL Final    MPV 05/22/2024 9.1 (L)  9.2 - 12.9 fL Final    Immature Granulocytes 05/22/2024 0.6 (H)  0.0 - 0.5 % Final    Gran # (ANC) 05/22/2024 4.8  1.8 - 7.7 K/uL Final    Immature Grans (Abs) 05/22/2024 0.04  0.00 - 0.04 K/uL Final    Comment: Mild elevation in immature granulocytes is non specific and   can be seen in a variety of conditions including stress response,   acute inflammation, trauma and pregnancy. Correlation with other   laboratory and clinical findings is essential.      Lymph # 05/22/2024 1.4  1.0 - 4.8 K/uL Final    Mono # 05/22/2024 0.4  0.3 - 1.0 K/uL Final    Eos # 05/22/2024 0.1  0.0 - 0.5 K/uL Final    Baso # 05/22/2024 0.05  0.00 - 0.20 K/uL Final    nRBC 05/22/2024 0  0 /100 WBC Final    Gran % 05/22/2024 70.4  38.0 - 73.0 % Final    Lymph % 05/22/2024 20.3  18.0 - 48.0 % Final    Mono % 05/22/2024 6.1  4.0 - 15.0 % Final    Eosinophil % 05/22/2024 1.9  0.0 - 8.0 % Final    Basophil % 05/22/2024 0.7  0.0 - 1.9 % Final    Differential Method 05/22/2024 Automated   Final    Vit D, 25-Hydroxy 05/22/2024 28 (L)  30 - 96 ng/mL Final    Comment: Vitamin D deficiency.........<10 ng/mL                              Vitamin D insufficiency......10-29 ng/mL       Vitamin D sufficiency........> or equal to 30 ng/mL  Vitamin D toxicity............>100 ng/mL      Vitamin B-12 05/22/2024 411  210 - 950 pg/mL Final    HIV 1/2 Ag/Ab  05/22/2024 Negative  Negative Final    Hepatitis C Ab 05/22/2024 Negative  Negative Final       Imaging  Echo    Result Date: 5/31/2024    Left Ventricle: The left ventricle is normal in size. Increased wall thickness. There is concentric remodeling. There is normal systolic function with a visually estimated ejection fraction of 60 - 65%. Global longitudinal strain is -14.0%. Grade I diastolic dysfunction.   Right Ventricle: Normal right ventricular cavity size. Wall thickness is normal. Systolic function is normal.   Aortic Valve: There is moderate stenosis. Aortic valve area by VTI is 1.08 cm². Aortic valve peak velocity is 2.09 m/s. Mean gradient is 9 mmHg. The dimensionless index is 0.46.   Pulmonic Valve: There is no stenosis.       Assessment  1. Nonrheumatic aortic valve stenosis  Mild-to-moderate.  Asymptomatic  - Ambulatory referral/consult to Cardiology    2. Primary hypertension  Controlled    3. Hypercholesterolemia  Controlled      Plan and Discussion    We discussed her diagnosis of aortic stenosis.  Discussed its natural history and indications for valve replacement.  Discussed regular follow-up.  Discussed symptoms which should prompt earlier evaluation.  No concerns about proceeding with her upcoming back procedure.    The ASCVD Risk score (Cassandra DK, et al., 2019) failed to calculate for the following reasons:    The 2019 ASCVD risk score is only valid for ages 40 to 79     Follow Up  Follow up in about 6 months (around 12/11/2024).      Reinaldo Owens MD

## 2024-06-11 NOTE — TELEPHONE ENCOUNTER
----- Message from Tierra Alcaraz sent at 6/11/2024  9:12 AM CDT -----  Regarding: Questions  Name of Who is Calling:  Patient          What is the request in detail:  Please contact patient she would like to speak with Dr. Song or his nurse            Can the clinic reply by [facility]: No            What Number to Call Back if not in [facility]: [phone]

## 2024-06-12 LAB
OHS QRS DURATION: 96 MS
OHS QTC CALCULATION: 393 MS

## 2024-06-18 ENCOUNTER — HOSPITAL ENCOUNTER (OUTPATIENT)
Facility: OTHER | Age: 84
Discharge: HOME | End: 2024-06-18
Attending: ANESTHESIOLOGY | Admitting: ANESTHESIOLOGY
Payer: MEDICARE

## 2024-06-18 VITALS
SYSTOLIC BLOOD PRESSURE: 172 MMHG | HEART RATE: 67 BPM | OXYGEN SATURATION: 97 % | HEIGHT: 66 IN | BODY MASS INDEX: 28.28 KG/M2 | WEIGHT: 176 LBS | DIASTOLIC BLOOD PRESSURE: 94 MMHG | RESPIRATION RATE: 15 BRPM | TEMPERATURE: 98 F

## 2024-06-18 DIAGNOSIS — M54.16 LUMBAR RADICULOPATHY: Primary | ICD-10-CM

## 2024-06-18 DIAGNOSIS — G89.29 CHRONIC PAIN: ICD-10-CM

## 2024-06-18 PROCEDURE — 64484 NJX AA&/STRD TFRM EPI L/S EA: CPT | Mod: LT,,, | Performed by: ANESTHESIOLOGY

## 2024-06-18 PROCEDURE — 63600175 PHARM REV CODE 636 W HCPCS: Performed by: ANESTHESIOLOGY

## 2024-06-18 PROCEDURE — 64484 NJX AA&/STRD TFRM EPI L/S EA: CPT | Mod: LT | Performed by: ANESTHESIOLOGY

## 2024-06-18 PROCEDURE — 99152 MOD SED SAME PHYS/QHP 5/>YRS: CPT | Performed by: ANESTHESIOLOGY

## 2024-06-18 PROCEDURE — 25500020 PHARM REV CODE 255: Performed by: ANESTHESIOLOGY

## 2024-06-18 PROCEDURE — 25000003 PHARM REV CODE 250: Performed by: STUDENT IN AN ORGANIZED HEALTH CARE EDUCATION/TRAINING PROGRAM

## 2024-06-18 PROCEDURE — 64483 NJX AA&/STRD TFRM EPI L/S 1: CPT | Mod: LT,,, | Performed by: ANESTHESIOLOGY

## 2024-06-18 PROCEDURE — 64483 NJX AA&/STRD TFRM EPI L/S 1: CPT | Mod: LT | Performed by: ANESTHESIOLOGY

## 2024-06-18 PROCEDURE — 25000003 PHARM REV CODE 250: Performed by: ANESTHESIOLOGY

## 2024-06-18 RX ORDER — LIDOCAINE HYDROCHLORIDE 20 MG/ML
INJECTION, SOLUTION INFILTRATION; PERINEURAL
Status: DISCONTINUED | OUTPATIENT
Start: 2024-06-18 | End: 2024-06-18 | Stop reason: HOSPADM

## 2024-06-18 RX ORDER — DEXAMETHASONE SODIUM PHOSPHATE 10 MG/ML
INJECTION INTRAMUSCULAR; INTRAVENOUS
Status: DISCONTINUED | OUTPATIENT
Start: 2024-06-18 | End: 2024-06-18 | Stop reason: HOSPADM

## 2024-06-18 RX ORDER — LIDOCAINE HYDROCHLORIDE 10 MG/ML
INJECTION, SOLUTION EPIDURAL; INFILTRATION; INTRACAUDAL; PERINEURAL
Status: DISCONTINUED | OUTPATIENT
Start: 2024-06-18 | End: 2024-06-18 | Stop reason: HOSPADM

## 2024-06-18 RX ORDER — FENTANYL CITRATE 50 UG/ML
INJECTION, SOLUTION INTRAMUSCULAR; INTRAVENOUS
Status: DISCONTINUED | OUTPATIENT
Start: 2024-06-18 | End: 2024-06-18 | Stop reason: HOSPADM

## 2024-06-18 RX ORDER — MIDAZOLAM HYDROCHLORIDE 1 MG/ML
INJECTION INTRAMUSCULAR; INTRAVENOUS
Status: DISCONTINUED | OUTPATIENT
Start: 2024-06-18 | End: 2024-06-18 | Stop reason: HOSPADM

## 2024-06-18 RX ORDER — SODIUM CHLORIDE 9 MG/ML
INJECTION, SOLUTION INTRAVENOUS CONTINUOUS
Status: DISCONTINUED | OUTPATIENT
Start: 2024-06-18 | End: 2024-06-18 | Stop reason: HOSPADM

## 2024-06-18 NOTE — DISCHARGE INSTRUCTIONS

## 2024-06-18 NOTE — DISCHARGE SUMMARY
Discharge Note  Short Stay      SUMMARY     Admit Date: 6/18/2024    Attending Physician: Bernard Song MD    Discharge Physician: Bernard Song MD      Discharge Date: 6/18/2024 3:40 PM    Procedure(s) (LRB):  LUMBAR TRANSFORAMINAL LEFT L5/S1 AND S1 (Left)    Final Diagnosis: Lumbar radiculopathy [M54.16]    Disposition: Home or self care    Patient Instructions:   Current Discharge Medication List        CONTINUE these medications which have NOT CHANGED    Details   amLODIPine (NORVASC) 10 MG tablet Take 1 tablet (10 mg total) by mouth once daily.  Qty: 90 tablet, Refills: 3    Comments: .  Associated Diagnoses: Essential hypertension      anastrozole (ARIMIDEX) 1 mg Tab Take 1 tablet (1 mg total) by mouth once daily.  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  Qty: 90 tablet, Refills: 3    Associated Diagnoses: Mixed hyperlipidemia      atorvastatin (LIPITOR) 40 MG tablet Take 1 tablet (40 mg total) by mouth once daily.  Qty: 90 tablet, Refills: 3    Associated Diagnoses: Mixed hyperlipidemia      carvediloL (COREG) 3.125 MG tablet Take 1 tablet (3.125 mg total) by mouth 2 (two) times daily with meals.  Qty: 180 tablet, Refills: 3    Comments: .  Associated Diagnoses: Essential hypertension      diazePAM (VALIUM) 2 MG tablet On call to MRI may repeat X 3 do not drive to or from MRI/procedure & for 24 hours  Qty: 3 tablet, Refills: 0    Associated Diagnoses: Lumbar radiculopathy, chronic; Renal mass; Dorsalgia, unspecified      diclofenac sodium (VOLTAREN) 1 % Gel Apply 2 g topically 4 (four) times daily as needed.  Qty: 100 g, Refills: 11    Associated Diagnoses: Chronic left shoulder pain      ergocalciferol (ERGOCALCIFEROL) 50,000 unit Cap Take 1 capsule (50,000 Units total) by mouth every 7 days.  Qty: 12 capsule,  Refills: 3    Associated Diagnoses: Vitamin D deficiency      fish oil-omega-3 fatty acids 300-1,000 mg capsule Take 1 capsule by mouth once daily.      gabapentin (NEURONTIN) 300 MG capsule Take 300 mg by mouth 3 (three) times daily.      hydroCHLOROthiazide (HYDRODIURIL) 25 MG tablet Take 1 tablet (25 mg total) by mouth once daily.  Qty: 90 tablet, Refills: 3    Comments: .  Associated Diagnoses: Essential hypertension      ketorolac (TORADOL) 10 mg tablet Take 1 tablet (10 mg total) by mouth every 6 (six) hours as needed for Pain.  Qty: 10 tablet, Refills: 0      LIDOcaine (LIDODERM) 5 % Place 1 patch onto the skin once daily. Remove & Discard patch within 12 hours or as directed by MD  Qty: 15 patch, Refills: 0      methocarbamoL (ROBAXIN) 750 MG Tab Take 1 tablet (750 mg total) by mouth 2 (two) times daily as needed (muscle spasm/back pain).  Qty: 15 tablet, Refills: 0    Associated Diagnoses: Lumbar radiculopathy; Piriformis syndrome of left side; Strain of left hamstring, initial encounter; Ischial bursitis of left side      valsartan (DIOVAN) 320 MG tablet Take 1 tablet (320 mg total) by mouth once daily.  Qty: 90 tablet, Refills: 3    Comments: .  Associated Diagnoses: Essential hypertension                 Discharge Diagnosis: Lumbar radiculopathy [M54.16]  Condition on Discharge: Stable with no complications to procedure   Diet on Discharge: Same as before.  Activity: as per instruction sheet.  Discharge to: Home with a responsible adult.  Follow up: 2-4 weeks       Please call my office or pager at [phone] if experienced any weakness or loss of sensation, fever > 101.5, pain uncontrolled with oral medications, persistent nausea/vomiting/or diarrhea, redness or drainage from the incisions, or any other worrisome concerns. If physician on call was not reached or could not communicate with our office for any reason please go to the nearest emergency department     Devyn Smith MD

## 2024-06-18 NOTE — OP NOTE
Lumbar Transforaminal Epidural Steroid Injection under Fluoroscopic Guidance    The procedure, risks, benefits, and options were discussed with the patient. There are no contraindications to the procedure. The patent expressed understanding and agreed to the procedure. Informed written consent was obtained prior to the start of the procedure and can be found in the patient's chart.    PATIENT NAME: Karis Briceño   MRN: [id number]     DATE OF PROCEDURE: 06/18/2024    PROCEDURE:  Left  L5/S1 and S1 Lumbar Transforaminal Epidural Steroid Injection under Fluoroscopic Guidance    PRE-OP DIAGNOSIS: Lumbar radiculopathy [M54.16] Lumbar radiculopathy [M54.16]    POST-OP DIAGNOSIS: Same    PHYSICIAN: Bernard Song MD    ASSISTANTS: Devyn Smith MD  [employer] PM&R PGY-4      MEDICATIONS INJECTED: Preservative-free Decadron 10mg with 5cc of Lidocaine 1% MPF     LOCAL ANESTHETIC INJECTED: Xylocaine 2%     SEDATION: Versed 2mg and Fentanyl 75mcg                                                                                                                                                                                     Conscious sedation ordered by M.D. Patient re-evaluation prior to administration of conscious sedation. No changes noted in patient's status from initial evaluation. The patient's vital signs were monitored by RN and patient remained hemodynamically stable throughout the procedure.    Event Time In   Sedation Start 1525   Sedation End 1537       ESTIMATED BLOOD LOSS: None    COMPLICATIONS: None    TECHNIQUE: Time-out was performed to identify the patient and procedure to be performed. With the patient laying in a prone position, the surgical area was prepped and draped in the usual sterile fashion using ChloraPrep and a fenestrated drape.The levels were determined under fluoroscopy guidance. Skin anesthesia was achieved by injecting Lidocaine 2% over the injection sites. The transforaminal spaces were then approached  with a 22 gauge, 3.5 inch spinal quinke needle that was introduced under fluoroscopic guidance in the AP and Lateral views. Once the needle tip was in the area of the transforaminal space, and there was no blood, CSF or paraesthesias, contrast dye Omnipaque (300mg/mL) was injected to confirm placement and there was no vascular runoff. Fluoroscopic imaging in the AP and lateral views revealed a clear outline of the spinal nerve with proximal spread of agent through the neural foramen into the epidural space. 3 mL of the medication mixture listed above was injected slowly at each site. Displacement of the radio opaque contrast after injection of the medication confirmed that the medication went into the area of the transforaminal spaces. The needles were removed and bleeding was nil. A sterile dressing was applied. No specimens collected. The patient tolerated the procedure well.     PRE-PROCEDURE PAIN SCORE: 4-10/10    POST-PROCEDURE PAIN SCORE: 0/10    The patient was monitored after the procedure in the recovery area. They were given post-procedure and discharge instructions to follow at home. The patient was discharged in a stable condition.    Devyn Smith MD    I reviewed and edited the fellow's note. I conducted my own interview and physical examination. I agree with the findings. I was present and supervising all critical portions of the procedure.

## 2024-06-20 ENCOUNTER — TELEPHONE (OUTPATIENT)
Dept: PAIN MEDICINE | Facility: CLINIC | Age: 84
End: 2024-06-20
Payer: MEDICARE

## 2024-06-20 NOTE — TELEPHONE ENCOUNTER
Staff called to offer patient an appt for her left foot numbness. Patient wanted to be seen sooner than Dr. Song next available day. Staff scheduled her with Lyn on tomorrow. Patient is in agreement to the above and verbalized understanding.

## 2024-06-20 NOTE — TELEPHONE ENCOUNTER
----- Message from Bobjessica Sedrick sent at 6/20/2024 10:08 AM CDT -----  Contact: Patient  Type:  Patient Call          Who Called: Patient         Does the patient know what this is regarding?: Requesting a call back ;pt would like to know if it's okay to have heat or ice on her left  hip today at therapy ; pt said she would like to know if she need to come in for foot numbness and soak in epsom salt ; please advise           Would the patient rather a call back or a response via [facility]?call           Best Call Back Number: [phone]             Additional Information:

## 2024-07-08 ENCOUNTER — TELEPHONE (OUTPATIENT)
Dept: PAIN MEDICINE | Facility: CLINIC | Age: 84
End: 2024-07-08
Payer: MEDICARE

## 2024-07-08 NOTE — TELEPHONE ENCOUNTER
----- Message from Jose Dutton sent at 7/8/2024  2:41 PM CDT -----  Name of Who is Calling:RAYA MCPHERSON [[id number]]           What is the request in detail:PT wants a call back she has some questions about her procedure 3 weeks ago the bottom of her left foot is numb and she wants to come in please call pt back ASAP             Can the clinic reply by [facility]: No              What [facility] to [facility] Back [facility]: [phone]

## 2024-07-08 NOTE — TELEPHONE ENCOUNTER
Staff spoke with patient and got her scheduled for an appointment with Dr. Song on 7/9 due to patient having numbness in her foot after her procedure. Patient verbalized understanding.

## 2024-07-09 ENCOUNTER — TELEPHONE (OUTPATIENT)
Dept: PAIN MEDICINE | Facility: CLINIC | Age: 84
End: 2024-07-09

## 2024-07-09 ENCOUNTER — OFFICE VISIT (OUTPATIENT)
Dept: PAIN MEDICINE | Facility: CLINIC | Age: 84
End: 2024-07-09
Attending: ANESTHESIOLOGY
Payer: MEDICARE

## 2024-07-09 VITALS
HEART RATE: 78 BPM | BODY MASS INDEX: 28.82 KG/M2 | SYSTOLIC BLOOD PRESSURE: 144 MMHG | RESPIRATION RATE: 18 BRPM | DIASTOLIC BLOOD PRESSURE: 80 MMHG | WEIGHT: 178.56 LBS

## 2024-07-09 DIAGNOSIS — M54.16 LUMBAR RADICULOPATHY: Primary | ICD-10-CM

## 2024-07-09 DIAGNOSIS — G89.4 CHRONIC PAIN SYNDROME: ICD-10-CM

## 2024-07-09 DIAGNOSIS — G57.02 PIRIFORMIS SYNDROME OF LEFT SIDE: Primary | ICD-10-CM

## 2024-07-09 DIAGNOSIS — M51.36 DDD (DEGENERATIVE DISC DISEASE), LUMBAR: ICD-10-CM

## 2024-07-09 DIAGNOSIS — Z17.0 MALIGNANT NEOPLASM OF LOWER-OUTER QUADRANT OF LEFT BREAST OF FEMALE, ESTROGEN RECEPTOR POSITIVE: ICD-10-CM

## 2024-07-09 DIAGNOSIS — C50.512 MALIGNANT NEOPLASM OF LOWER-OUTER QUADRANT OF LEFT BREAST OF FEMALE, ESTROGEN RECEPTOR POSITIVE: ICD-10-CM

## 2024-07-09 DIAGNOSIS — M54.17 LUMBOSACRAL RADICULOPATHY: ICD-10-CM

## 2024-07-09 DIAGNOSIS — G57.02 PIRIFORMIS SYNDROME OF LEFT SIDE: ICD-10-CM

## 2024-07-09 PROCEDURE — 1159F MED LIST DOCD IN RCRD: CPT | Mod: CPTII,S$GLB,,

## 2024-07-09 PROCEDURE — 1125F AMNT PAIN NOTED PAIN PRSNT: CPT | Mod: CPTII,S$GLB,,

## 2024-07-09 PROCEDURE — 3288F FALL RISK ASSESSMENT DOCD: CPT | Mod: CPTII,S$GLB,,

## 2024-07-09 PROCEDURE — 1101F PT FALLS ASSESS-DOCD LE1/YR: CPT | Mod: CPTII,S$GLB,,

## 2024-07-09 PROCEDURE — 1160F RVW MEDS BY RX/DR IN RCRD: CPT | Mod: CPTII,S$GLB,,

## 2024-07-09 PROCEDURE — 99214 OFFICE O/P EST MOD 30 MIN: CPT | Mod: S$GLB,,,

## 2024-07-09 PROCEDURE — 3077F SYST BP >= 140 MM HG: CPT | Mod: CPTII,S$GLB,,

## 2024-07-09 PROCEDURE — 3079F DIAST BP 80-89 MM HG: CPT | Mod: CPTII,S$GLB,,

## 2024-07-09 PROCEDURE — 99999 PR PBB SHADOW E&M-EST. PATIENT-LVL IV: CPT | Mod: PBBFAC,,,

## 2024-07-09 RX ORDER — ANASTROZOLE 1 MG/1
1 TABLET ORAL DAILY
Qty: 30 TABLET | Refills: 11 | Status: SHIPPED | OUTPATIENT
Start: 2024-07-09 | End: 2025-07-09

## 2024-07-09 NOTE — PROGRESS NOTES
"Chronic Pain - Established Patient      Chief Complaint:   Chief Complaint   Patient presents with    Leg Pain     Left Leg Posterior         SUBJECTIVE:    Interval History 7/9/2024:  Karis Briceño returns to clinic s/p Left L5/S1 and S1 TFESI on 6/18/2024. She reports 100% relief of her back pain. Today, she reports numbness to the bottom of her left foot. She also reports left buttock pain that goes to the posterior thigh. She continues Gabapentin 300 mg TID. She reports some drowsiness during the day. She denies weakness or recent falls. She continues Physical Therapy, but does not continue HEP.  The patient denies fever/night sweats, urinary incontinence, bowel incontinence, significant weight changes, significant motor weakness or changes, or loss of sensations. Her pain today is 6/10.    Interval HPI 6/6/24:  Karis Briceño returns to clinic for follow up. V ordered open MRI and CT abd and pelvis. Continued with PT and on gabapentin and robaxin for pain. Today reports pain that localizes to her left posterior hamstring with occasional back pain and left buttock pain with associated numbness on the top and bottom of her left foot. She also reports some "locking". When she ADF and APF her foot, she feels pain in her posterior hamstring. She ambulates with a cane and reports some weakness in her LLE. Pain is a 4/10. Made worse by walking. Improves with sitting and rest. She reports offloading her LLE. She reports stopping gabapentin. She reports some sleepiness in the afternoon. She ran out of robaxin. She feels that this helped. She has stopped going to PT due to foot numbness.     Initial HPI 5/22/24:  Karis Briceño presents to the clinic for the evaluation of left buttock and thigh pain. The pain started 3 weeks ago without inciting event and symptoms have been unchanged.The pain is located in the left buttock and posterior thigh area and radiates to the left knee.  The pain is described as sharp and " tingling and is rated as 7/10. The pain is rated with a score of  5/10 on the BEST day and a score of 8/10 on the WORST day.  Symptoms interfere with daily activity. The pain is exacerbated by Standing, Touching, Walking, and Extension.  The pain is mitigated by medications. She reports paraesthesia along her lateral LLE and lateral foot. She takes Gabapentin 300mg morning and 600mg QHS and BC powder with some relief.  She went to the ED 3 weeks ago and received Steroid injection, toradol injection, lidocaine patch, and Robaxin. Had previous Right L4/L5 TF PRASAD cancelled in 11/2023 due to anxiousness about procedure.    Patient denies night fever/night sweats, urinary incontinence, bowel incontinence, significant weight loss, significant motor weakness.    Physical Therapy/Home Exercise: no      Pain Disability Index Review:      7/9/2024     2:20 PM 5/22/2024     9:48 AM   Last 3 PDI Scores   Pain Disability Index (PDI) 38 42       Pain Medications:  Gabapentin  BC arthritis powder     report:  Reviewed and consistent with medication use as prescribed.    Pain Procedures:   6/18/2024 - Left L5/S1 and S1 TFESI    Imaging:   CT Lumbar spine FINDINGS: 9/2023  Alignment: Grade 1 anterolisthesis of L3 on L4.     Vertebrae: No acute fracture.  Multilevel degenerative endplate changes.  No suspicious lytic or blastic lesions.     Discs: Degenerative disc space narrowing extending from L2-L3 through L5-S1.  Vacuum phenomenon at L2-L3, L4-L5 and L5-S1.  No endplate erosion.     Sacroiliac joints: Symmetric degenerative changes.     Degenerative findings:     T12-L1: No spinal canal stenosis.  No neural foraminal narrowing.     L1-L2: No spinal canal stenosis.  No neural foraminal narrowing     L2-L3: Circumferential disc bulge.  Bilateral facet arthropathy, bilateral ligamentum flavum hypertrophy and prominent posterior epidural fat.  Moderate spinal canal and lateral recess stenosis.  Moderate right and mild left neural  foraminal narrowing.     L3-L4: Grade 1 anterolisthesis with uncovering the intervertebral disc.  Circumferential disc bulge.  Bilateral facet arthropathy, bilateral ligamentum flavum hypertrophy and prominent posterior epidural fat.  Severe spinal canal and lateral recess stenosis.  Moderate bilateral neural foraminal narrowing.     L4-L5: Circumferential disc bulge with a superimposed posterior central protrusion.  Bilateral facet arthropathy, bilateral ligamentum flavum hypertrophy and prominent posterior epidural fat.  Severe spinal canal and lateral recess stenosis.  Severe left and moderate to severe right neural foraminal narrowing.     L5-S1: Circumferential disc bulge.  Bilateral facet arthropathy, bilateral ligamentum flavum hypertrophy and prominent posterior epidural fat.  Moderate spinal canal stenosis.  Severe bilateral neural foraminal narrowing.     Paraspinal muscles & soft tissues: Multiple calcified gallstones.  0.7 cm calcified left renal stone.  0.9 cm left renal cortical lesion that demonstrates attenuation values higher than that of a simple cyst.  Trace atherosclerotic calcification of the abdominal aorta and iliac arteries.  Mild fatty degeneration of the posterior paraspinal musculature.     Impression:  Advanced lumbar spondylosis most severe from L2-L3 through L5-S1 as detailed above.  Cholelithiasis.  Left nonobstructing nephrolithiasis.  0.9 cm left renal cortical lesion that does not meet CT criteria for a simple cyst.  Recommend further characterization with a dedicated renal ultrasound.    LXR FINDINGS: 7/2023  No evidence of an acute fracture or dislocation.  Alignment is maintained.  Intervertebral disc height loss worse at inferior lumbar levels.     Impression:     Degenerative change at inferior lumbar levels without instability.    Past Medical History:   Diagnosis Date    Breast cancer 12/20/2021    Left breast IDC    Gallstone pancreatitis 09/2016    Gallstones      Hyperlipidemia     Hypertension      Past Surgical History:   Procedure Laterality Date    BREAST BIOPSY Bilateral 12/20/2021    Right side benign, Left side IDC    HYSTERECTOMY      BROOKS 3, AUB    TRANSFORAMINAL EPIDURAL INJECTION OF STEROID Left 6/18/2024    Procedure: LUMBAR TRANSFORAMINAL LEFT L5/S1 AND S1;  Surgeon: Bernard Song MD;  Location: [facility] PAIN [facility]GT;  Service: Pain Management;  Laterality: Left;  [phone]  2 WK F/U CHRISTINA     Social History     Socioeconomic History    Marital status: Single   Tobacco Use    Smoking status: Never    Smokeless tobacco: Never   Substance and Sexual Activity    Alcohol use: Yes     Comment: socially    Drug use: No    Sexual activity: Not Currently     Family History   Problem Relation Name Age of Onset    No Known Problems Mother      Heart attack Father      Coronary artery disease Father      Coronary artery disease Sister      Heart attack Sister      Skin cancer Neg Hx      Colon cancer Neg Hx      Breast cancer Neg Hx      Ovarian cancer Neg Hx         Review of patient's allergies indicates:  No Known Allergies    Current Outpatient Medications   Medication Sig    amLODIPine (NORVASC) 10 MG tablet Take 1 tablet (10 mg total) by mouth once daily.    atorvastatin (LIPITOR) 40 MG tablet Take 1 tablet (40 mg total) by mouth once daily.    carvediloL (COREG) 3.125 MG tablet Take 1 tablet (3.125 mg total) by mouth 2 (two) times daily with meals.    diazePAM (VALIUM) 2 MG tablet On call to MRI may repeat X 3 do not drive to or from MRI/procedure & for 24 hours    diclofenac sodium (VOLTAREN) 1 % Gel Apply 2 g topically 4 (four) times daily as needed.    ergocalciferol (ERGOCALCIFEROL) 50,000 unit Cap Take 1 capsule (50,000 Units total) by mouth every 7 days.    fish oil-omega-3 fatty acids 300-1,000 mg capsule Take 1 capsule by mouth once daily.    gabapentin (NEURONTIN) 300 MG capsule Take 300 mg by mouth 3 (three) times daily.    hydroCHLOROthiazide (HYDRODIURIL) 25 MG  tablet Take 1 tablet (25 mg total) by mouth once daily.    ketorolac (TORADOL) 10 mg tablet Take 1 tablet (10 mg total) by mouth every 6 (six) hours as needed for Pain.    LIDOcaine (LIDODERM) 5 % Place 1 patch onto the skin once daily. Remove & Discard patch within 12 hours or as directed by MD    methocarbamoL (ROBAXIN) 750 MG Tab Take 1 tablet (750 mg total) by mouth 2 (two) times daily as needed (muscle spasm/back pain).    valsartan (DIOVAN) 320 MG tablet Take 1 tablet (320 mg total) by mouth once daily.    anastrozole (ARIMIDEX) 1 mg Tab Take 1 tablet (1 mg total) by mouth once daily.    aspirin (ECOTRIN) 81 MG EC tablet Take 1 tablet (81 mg total) by mouth once daily. (Patient not taking: Reported on 6/23/2022)     No current facility-administered medications for this visit.       REVIEW OF SYSTEMS:    GENERAL:  No weight loss, malaise or fevers.  HEENT:  Negative for frequent or significant headaches.  NECK:  Negative for lumps, goiter, pain and significant neck swelling.  RESPIRATORY:  Negative for cough, wheezing or shortness of breath.  CARDIOVASCULAR:  Negative for chest pain, leg swelling or palpitations.  GI:  Negative for abdominal discomfort, blood in stools or black stools or change in bowel habits.  MUSCULOSKELETAL:  See HPI. Left buttock pain and posterior leg pain.  SKIN:  Negative for lesions, rash, and itching.  PSYCH:  Negative for sleep disturbance, mood disorder and recent psychosocial stressors.  HEMATOLOGY/LYMPHOLOGY:  Negative for prolonged bleeding, bruising easily or swollen nodes.  NEURO:   No history of headaches, syncope, paralysis, seizures or tremors. Radiating pain down to left knee. Paraesthesia in lateral leg and foot.  All other reviewed and negative other than HPI.    OBJECTIVE:    BP (!) 144/80 (BP Location: Right arm, Patient Position: Sitting)   Pulse 78   Resp 18   Wt 81 kg (178 lb 9.2 oz)   BMI 28.82 kg/m²     PHYSICAL EXAMINATION:     General appearance: Well  appearing, in no acute distress, alert and oriented x3.  Psych:  Mood and affect appropriate.  Skin: Skin color, texture, turgor normal, no rashes or lesions, in both upper and lower body.  Head/face:  Normocephalic, atraumatic. No palpable lymph nodes.  Neck: No pain to palpation over the cervical paraspinous muscles. Spurling Negative. No pain with neck flexion, extension, or lateral flexion.   Cor: Rate regular. DP and PD pulses 2+ bilaterally.   Pulm: Bilateral chest rise. In no apparent respiratory distress.   GI:  Non-distended.   Back: Straight leg raising negative for radicular pain bilaterally. No pain to palpation over the spine or costovertebral angles. Full ROM without pain.   Extremities: Peripheral joint ROM is full and pain free without obvious instability or laxity in all four extremities. No deformities, edema, or skin discoloration. Good capillary refill.  Musculoskeletal: Knee and hip provacative maneuvers are negative. Bilateral upper and lower extremity strength is normal and symmetric.  No atrophy or tone abnormalities are noted. Maximal tenderness over left piriformis with positive piriformis stretch test. No tenderness over b/l GTB.   Neuro: 2+ BL patellar reflexes. No clonus. Plantar reflexes are down-going. Corona absent. Diminished sensation along left lower leg and plantar aspect of foot.   Gait: Antalgic-ambulates with single point cane.    Imaging: Reviewed patient MRI report. Demonstrated lumbar spondylosis and multilevel disc disease with multilevel foraminal stenosis and spinal stenosis Spinal stenosis most severe at L4/5. There is left L5/S1 paracentral disc herniation with severe left L5/S1 foraminal stenosis and displacement of the descending left S1 nerve root.     ASSESSMENT: 83 y.o. year old female with primarily left posterior leg pain over the hamstring with involvement of the low back and left buttock and paresthesia of her left foot that is consistent with lumbar  radiculopathy. Exam was notable for positive SLR on the left, weakness in an L4 - S1 distribution. Presentation is concordant with MRI  L spine that demonstrated left L5/S1 paracentral disc herniation with severe left L5/S1 foraminal stenosis and displacement of the descending left S1 nerve root. Hamstring strain is on the differential due to focal tenderness over her site of maximal pain.     1. Lumbar radiculopathy        2. Chronic pain syndrome        3. DDD (degenerative disc disease), lumbar        4. Piriformis syndrome of left side        5. Lumbosacral radiculopathy              PLAN:   We discussed with the patient the assessment and recommendations. The following is the plan we agreed on:   - Reviewed history and imaging of patient.   - She is s/p left L5/S1 and Left S1 TFESI with 100% relief of back pain  - Scheduled patient for in-office Left piriformis injection  - Continue PT for continued strength and stability. Encouraged patient not to skip therapy  - Encouraged patient to participate in HEP daily from PT  - Continue Gabapentin 300 mg TID  - Continue Robaxin 750mg BID PRN  - Counseled patient regarding the importance of activity modification and physical therapy.  - RTC 2 weeks after above procedure.     Claribel Caldwell NP   07/09/2024

## 2024-07-19 ENCOUNTER — TELEPHONE (OUTPATIENT)
Dept: HEMATOLOGY/ONCOLOGY | Facility: CLINIC | Age: 84
End: 2024-07-19
Payer: MEDICARE

## 2024-07-19 NOTE — TELEPHONE ENCOUNTER
"----- Message from Corinne E Coniglio, RN sent at 7/19/2024  9:49 AM CDT -----  I would wait until maybe 1 or 2 incase they drop a bunch of cancer patients after lunch  ----- Message -----  From: Zulma Zapien RN  Sent: 7/19/2024   9:40 AM CDT  To: Corinne E Coniglio, RN; Moon Rizvi RN    Do you all need the new patient and multi d slot on 7/22 ? Patient requesting to be rescheduled on 7/22.  ----- Message -----  From: Danny Tellez  Sent: 7/19/2024   9:21 AM CDT  To: [facility]  Pool; #    Reschedule Existing Appointment    Appt Date: 7/19    Type of appt: 6 mo F/u    Physician: Sheen    Reason for rescheduling?  Weather; Requesting to r/s for 7/22    Caller: Self    Contact Preference: [phone]      Additional Information:  "Thank you for all that you do for our patients"  "

## 2024-07-19 NOTE — TELEPHONE ENCOUNTER
"----- Message from Danny Tellez sent at 7/19/2024  9:18 AM CDT -----  Reschedule Existing Appointment    Appt Date: 7/19    Type of appt: 6 mo F/u    Physician: May    Reason for rescheduling?  Weather; Requesting to r/s for 7/22    Caller: Self    Contact Preference: [phone]      Additional Information:  "Thank you for all that you do for our patients"  "

## 2024-08-05 ENCOUNTER — TELEPHONE (OUTPATIENT)
Dept: PAIN MEDICINE | Facility: CLINIC | Age: 84
End: 2024-08-05
Payer: MEDICARE

## 2024-08-07 ENCOUNTER — TELEPHONE (OUTPATIENT)
Dept: PAIN MEDICINE | Facility: CLINIC | Age: 84
End: 2024-08-07
Payer: MEDICARE

## 2024-08-08 ENCOUNTER — PROCEDURE VISIT (OUTPATIENT)
Dept: PAIN MEDICINE | Facility: CLINIC | Age: 84
End: 2024-08-08
Attending: ANESTHESIOLOGY
Payer: MEDICARE

## 2024-08-08 VITALS
WEIGHT: 178.56 LBS | TEMPERATURE: 98 F | HEART RATE: 89 BPM | DIASTOLIC BLOOD PRESSURE: 85 MMHG | SYSTOLIC BLOOD PRESSURE: 165 MMHG | BODY MASS INDEX: 28.82 KG/M2

## 2024-08-08 DIAGNOSIS — M54.16 LUMBAR RADICULITIS: ICD-10-CM

## 2024-08-08 DIAGNOSIS — G57.02 PIRIFORMIS SYNDROME OF LEFT SIDE: Primary | ICD-10-CM

## 2024-08-08 DIAGNOSIS — M79.18 MYOFASCIAL PAIN: ICD-10-CM

## 2024-08-08 RX ORDER — TRIAMCINOLONE ACETONIDE 40 MG/ML
40 INJECTION, SUSPENSION INTRA-ARTICULAR; INTRAMUSCULAR
Status: COMPLETED | OUTPATIENT
Start: 2024-08-08 | End: 2024-08-08

## 2024-08-08 RX ORDER — DULOXETIN HYDROCHLORIDE 60 MG/1
60 CAPSULE, DELAYED RELEASE ORAL DAILY
Qty: 30 CAPSULE | Refills: 11 | Status: SHIPPED | OUTPATIENT
Start: 2024-08-08 | End: 2025-08-08

## 2024-08-08 RX ADMIN — TRIAMCINOLONE ACETONIDE 40 MG: 40 INJECTION, SUSPENSION INTRA-ARTICULAR; INTRAMUSCULAR at 03:08

## 2024-08-21 ENCOUNTER — TELEPHONE (OUTPATIENT)
Dept: INTERNAL MEDICINE | Facility: CLINIC | Age: 84
End: 2024-08-21
Payer: MEDICARE

## 2024-08-21 NOTE — TELEPHONE ENCOUNTER
----- Message from Bryn Dubose sent at 8/21/2024 10:17 AM CDT -----  Contact: Jana  Type:  Patient Call          Who Called: Patient         Does the patient know what this is regarding?: Requesting a call back about having her appt rescheduled to Oct 14 or 15 during her fall break ;please advise           Would the patient rather a call back or a response via [facility]?call           Best Call Back Number:[phone]             Additional Information:

## 2024-08-21 NOTE — TELEPHONE ENCOUNTER
"Rescheduled appointment with Dr. Roque for 10/14/24.     [employer] message sent.     "  ----- Message from Bryn Dubose sent at 8/21/2024 10:17 AM CDT -----  Contact: Jana  Type:  Patient Call              Who Called: Patient            Does the patient know what this is regarding?: Requesting a call back about having her appt rescheduled to Oct 14 or 15 during her fall break ;please advise               Would the patient rather a call back or a response via [facility]?call               Best Call Back Number:[phone]                  Additional Information:      "  "

## 2024-08-22 ENCOUNTER — TELEPHONE (OUTPATIENT)
Dept: HEMATOLOGY/ONCOLOGY | Facility: CLINIC | Age: 84
End: 2024-08-22
Payer: MEDICARE

## 2024-08-22 NOTE — TELEPHONE ENCOUNTER
----- Message from Diego Ricci RN sent at 8/21/2024  4:42 PM CDT -----  Regarding: FW: reschedule appt  Contact: [phone]    ----- Message -----  From: Veronika Tovar  Sent: 8/21/2024  10:00 AM CDT  To: [facility]  Pool  Subject: reschedule appt                                  Consult/Advisory     Name Of Caller: pt         Contact Preference:   [phone]     Nature of call: would like to r/s 8/23 appt to Oct 14 and Oct 15. Please call to r/s

## 2024-08-22 NOTE — TELEPHONE ENCOUNTER
----- Message from Karolina Haines sent at 8/22/2024 11:53 AM CDT -----  Regarding: returning missed call  Contact: Pt @[phone]  Pt is returning a missed call from someone in the office and is asking for a return call back soon. Thanks.         Reason for call: discuss times of appt

## 2024-08-26 ENCOUNTER — TELEPHONE (OUTPATIENT)
Dept: INTERNAL MEDICINE | Facility: CLINIC | Age: 84
End: 2024-08-26
Payer: MEDICARE

## 2024-08-26 DIAGNOSIS — G89.29 CHRONIC RIGHT-SIDED LOW BACK PAIN WITH RIGHT-SIDED SCIATICA: Primary | ICD-10-CM

## 2024-08-26 DIAGNOSIS — M25.559 HIP PAIN, UNSPECIFIED LATERALITY: ICD-10-CM

## 2024-08-26 DIAGNOSIS — M54.41 CHRONIC RIGHT-SIDED LOW BACK PAIN WITH RIGHT-SIDED SCIATICA: Primary | ICD-10-CM

## 2024-08-26 NOTE — TELEPHONE ENCOUNTER
----- Message from Jeanie Darleen Bhatt sent at 8/26/2024  1:26 PM CDT -----  Contact: [phone]  1MEDICALADVICE     Patient is calling for Medical Advice regarding:pt is calling about her therapy the [facility] one will not work for her. She would like to go to the one [facility] [phone] please call her back and advice.  They stay open longer.    How long has patient had these symptoms:    Pharmacy name and phone#:    Patient wants a call back or thru [facility]:callback    Comments:    Please advise patient replies from provider may take up to 48 hours.

## 2024-08-26 NOTE — TELEPHONE ENCOUNTER
Spoke with the pt and pended a new external referral to [facility] for her hip pian.

## 2024-08-28 NOTE — TELEPHONE ENCOUNTER
"Fax sent & confm. rec'd:  "Your fax has been successfully sent to [phone] at [phone].  ------------------------------------------------------------  From: [id number]  ------------------------------------------------------------      8/28/2024 9:13:51 AM Transmission Record          Sent to [phone] with remote ID "[id number]"          Result: (0/339;0/0) Success          Page record: 1 - 3          Elapsed time: 01:16 on channel 41  "  "

## 2024-08-28 NOTE — TELEPHONE ENCOUNTER
Referral was faxed to [facility] [phone] [facility] INFORMING PATIENT   Surgeon/Pathologist Verbiage (Will Incorporate Name Of Surgeon From Intro If Not Blank): operated in two distinct and integrated capacities as the surgeon and pathologist.

## 2024-09-05 ENCOUNTER — TELEPHONE (OUTPATIENT)
Dept: HEMATOLOGY/ONCOLOGY | Facility: CLINIC | Age: 84
End: 2024-09-05
Payer: MEDICARE

## 2024-09-16 ENCOUNTER — TELEPHONE (OUTPATIENT)
Dept: PAIN MEDICINE | Facility: CLINIC | Age: 84
End: 2024-09-16
Payer: MEDICARE

## 2024-09-16 NOTE — TELEPHONE ENCOUNTER
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 Call        Best Call Back Number: [phone]        Additional Information:

## 2024-09-16 NOTE — TELEPHONE ENCOUNTER
Staff spoke with patient. Patient states she needs to reschedule her appointment. Staff informed her of Dr. Paul availability patient decided to go with a NP. Staff scheduled her on 9/25 with Calli Gillette. Patient is in agreement to the above and verbalized understanding.

## 2024-09-23 DIAGNOSIS — S76.312A STRAIN OF LEFT HAMSTRING, INITIAL ENCOUNTER: ICD-10-CM

## 2024-09-23 DIAGNOSIS — I10 ESSENTIAL HYPERTENSION: ICD-10-CM

## 2024-09-23 DIAGNOSIS — M54.16 LUMBAR RADICULOPATHY: ICD-10-CM

## 2024-09-23 DIAGNOSIS — E78.2 MIXED HYPERLIPIDEMIA: ICD-10-CM

## 2024-09-23 DIAGNOSIS — G57.02 PIRIFORMIS SYNDROME OF LEFT SIDE: ICD-10-CM

## 2024-09-23 DIAGNOSIS — M70.72 ISCHIAL BURSITIS OF LEFT SIDE: ICD-10-CM

## 2024-09-23 NOTE — TELEPHONE ENCOUNTER
Pt requesting atorvastatin, amlodipine and valsartan refills. Last refill for all 7/13/23. Last visit 5/22/24 with Dr. Roque.

## 2024-09-24 RX ORDER — VALSARTAN 320 MG/1
320 TABLET ORAL DAILY
Qty: 90 TABLET | Refills: 3 | Status: SHIPPED | OUTPATIENT
Start: 2024-09-24

## 2024-09-24 RX ORDER — METHOCARBAMOL 750 MG/1
750 TABLET, FILM COATED ORAL 2 TIMES DAILY PRN
Qty: 20 TABLET | Refills: 0 | Status: SHIPPED | OUTPATIENT
Start: 2024-09-24

## 2024-09-24 RX ORDER — AMLODIPINE BESYLATE 10 MG/1
10 TABLET ORAL DAILY
Qty: 90 TABLET | Refills: 3 | Status: SHIPPED | OUTPATIENT
Start: 2024-09-24

## 2024-09-24 RX ORDER — ATORVASTATIN CALCIUM 40 MG/1
40 TABLET, FILM COATED ORAL DAILY
Qty: 90 TABLET | Refills: 3 | Status: SHIPPED | OUTPATIENT
Start: 2024-09-24

## 2024-09-24 NOTE — TELEPHONE ENCOUNTER
Refill Routing Note   Medication(s) are not appropriate for processing by [facility] for the following reason(s):        No active prescription written by provider    [facility] action(s):  Defer               Appointments  past 12m or future 3m with PCP    Date Provider   Last Visit   5/22/2024 Oly Roque MD   Next Visit   10/14/2024 Oly Roque MD   ED visits in past 90 days: 0        Note composed:12:28 AM 09/24/2024

## 2024-09-24 NOTE — TELEPHONE ENCOUNTER
No care due was identified.  [facility] Embedded Care Due Messages. Reference number: [id number].   9/24/2024 12:19:51 AM CDT

## 2024-10-14 ENCOUNTER — LAB VISIT (OUTPATIENT)
Dept: LAB | Facility: OTHER | Age: 84
End: 2024-10-14
Attending: STUDENT IN AN ORGANIZED HEALTH CARE EDUCATION/TRAINING PROGRAM
Payer: MEDICARE

## 2024-10-14 ENCOUNTER — OFFICE VISIT (OUTPATIENT)
Dept: INTERNAL MEDICINE | Facility: CLINIC | Age: 84
End: 2024-10-14
Payer: MEDICARE

## 2024-10-14 ENCOUNTER — OFFICE VISIT (OUTPATIENT)
Dept: PAIN MEDICINE | Facility: CLINIC | Age: 84
End: 2024-10-14
Attending: ANESTHESIOLOGY
Payer: MEDICARE

## 2024-10-14 ENCOUNTER — PATIENT MESSAGE (OUTPATIENT)
Dept: PAIN MEDICINE | Facility: OTHER | Age: 84
End: 2024-10-14
Payer: MEDICARE

## 2024-10-14 VITALS
SYSTOLIC BLOOD PRESSURE: 150 MMHG | BODY MASS INDEX: 29.25 KG/M2 | OXYGEN SATURATION: 97 % | WEIGHT: 181.19 LBS | HEART RATE: 87 BPM | DIASTOLIC BLOOD PRESSURE: 70 MMHG

## 2024-10-14 VITALS
SYSTOLIC BLOOD PRESSURE: 167 MMHG | HEART RATE: 76 BPM | WEIGHT: 180.56 LBS | BODY MASS INDEX: 29.14 KG/M2 | TEMPERATURE: 98 F | DIASTOLIC BLOOD PRESSURE: 85 MMHG | OXYGEN SATURATION: 99 %

## 2024-10-14 DIAGNOSIS — M54.16 LUMBAR RADICULOPATHY: ICD-10-CM

## 2024-10-14 DIAGNOSIS — M25.552 PAIN OF LEFT HIP: ICD-10-CM

## 2024-10-14 DIAGNOSIS — E78.2 MIXED HYPERLIPIDEMIA: ICD-10-CM

## 2024-10-14 DIAGNOSIS — M54.41 CHRONIC RIGHT-SIDED LOW BACK PAIN WITH RIGHT-SIDED SCIATICA: ICD-10-CM

## 2024-10-14 DIAGNOSIS — R73.03 PREDIABETES: ICD-10-CM

## 2024-10-14 DIAGNOSIS — E55.9 VITAMIN D DEFICIENCY: ICD-10-CM

## 2024-10-14 DIAGNOSIS — M48.061 NEUROFORAMINAL STENOSIS OF LUMBAR SPINE: ICD-10-CM

## 2024-10-14 DIAGNOSIS — G89.29 CHRONIC RIGHT-SIDED LOW BACK PAIN WITH RIGHT-SIDED SCIATICA: ICD-10-CM

## 2024-10-14 DIAGNOSIS — M54.16 LUMBAR RADICULOPATHY: Primary | ICD-10-CM

## 2024-10-14 DIAGNOSIS — C50.512 MALIGNANT NEOPLASM OF LOWER-OUTER QUADRANT OF LEFT BREAST OF FEMALE, ESTROGEN RECEPTOR POSITIVE: ICD-10-CM

## 2024-10-14 DIAGNOSIS — Z23 NEED FOR VACCINATION: ICD-10-CM

## 2024-10-14 DIAGNOSIS — Z17.0 MALIGNANT NEOPLASM OF LOWER-OUTER QUADRANT OF LEFT BREAST OF FEMALE, ESTROGEN RECEPTOR POSITIVE: ICD-10-CM

## 2024-10-14 DIAGNOSIS — M79.18 MYOFASCIAL PAIN: ICD-10-CM

## 2024-10-14 DIAGNOSIS — Z00.00 HEALTH MAINTENANCE EXAMINATION: ICD-10-CM

## 2024-10-14 DIAGNOSIS — I10 ESSENTIAL HYPERTENSION: Primary | ICD-10-CM

## 2024-10-14 DIAGNOSIS — M47.816 LUMBAR SPONDYLOSIS: ICD-10-CM

## 2024-10-14 DIAGNOSIS — G89.4 CHRONIC PAIN SYNDROME: Primary | ICD-10-CM

## 2024-10-14 DIAGNOSIS — I35.0 NONRHEUMATIC AORTIC VALVE STENOSIS: ICD-10-CM

## 2024-10-14 PROBLEM — Z85.3 HISTORY OF BREAST CANCER: Status: ACTIVE | Noted: 2024-10-14

## 2024-10-14 LAB
ALBUMIN SERPL BCP-MCNC: 3.6 G/DL (ref 3.5–5.2)
ALP SERPL-CCNC: 73 U/L (ref 55–135)
ALT SERPL W/O P-5'-P-CCNC: 20 U/L (ref 10–44)
ANION GAP SERPL CALC-SCNC: 10 MMOL/L (ref 8–16)
AST SERPL-CCNC: 21 U/L (ref 10–40)
BASOPHILS # BLD AUTO: 0.05 K/UL (ref 0–0.2)
BASOPHILS NFR BLD: 0.8 % (ref 0–1.9)
BILIRUB SERPL-MCNC: 0.6 MG/DL (ref 0.1–1)
BUN SERPL-MCNC: 17 MG/DL (ref 8–23)
CALCIUM SERPL-MCNC: 9.7 MG/DL (ref 8.7–10.5)
CHLORIDE SERPL-SCNC: 102 MMOL/L (ref 95–110)
CO2 SERPL-SCNC: 30 MMOL/L (ref 23–29)
CREAT SERPL-MCNC: 1 MG/DL (ref 0.5–1.4)
DIFFERENTIAL METHOD BLD: ABNORMAL
EOSINOPHIL # BLD AUTO: 0.2 K/UL (ref 0–0.5)
EOSINOPHIL NFR BLD: 2.4 % (ref 0–8)
ERYTHROCYTE [DISTWIDTH] IN BLOOD BY AUTOMATED COUNT: 12.5 % (ref 11.5–14.5)
EST. GFR  (NO RACE VARIABLE): 56 ML/MIN/1.73 M^2
ESTIMATED AVG GLUCOSE: 128 MG/DL (ref 68–131)
GLUCOSE SERPL-MCNC: 161 MG/DL (ref 70–110)
HBA1C MFR BLD: 6.1 % (ref 4–5.6)
HCT VFR BLD AUTO: 44.8 % (ref 37–48.5)
HGB BLD-MCNC: 14.6 G/DL (ref 12–16)
IMM GRANULOCYTES # BLD AUTO: 0.04 K/UL (ref 0–0.04)
IMM GRANULOCYTES NFR BLD AUTO: 0.6 % (ref 0–0.5)
LYMPHOCYTES # BLD AUTO: 1.4 K/UL (ref 1–4.8)
LYMPHOCYTES NFR BLD: 21.6 % (ref 18–48)
MCH RBC QN AUTO: 31.1 PG (ref 27–31)
MCHC RBC AUTO-ENTMCNC: 32.6 G/DL (ref 32–36)
MCV RBC AUTO: 95 FL (ref 82–98)
MONOCYTES # BLD AUTO: 0.4 K/UL (ref 0.3–1)
MONOCYTES NFR BLD: 6.2 % (ref 4–15)
NEUTROPHILS # BLD AUTO: 4.5 K/UL (ref 1.8–7.7)
NEUTROPHILS NFR BLD: 68.4 % (ref 38–73)
NRBC BLD-RTO: 0 /100 WBC
PLATELET # BLD AUTO: 273 K/UL (ref 150–450)
PMV BLD AUTO: 8.8 FL (ref 9.2–12.9)
POTASSIUM SERPL-SCNC: 3.6 MMOL/L (ref 3.5–5.1)
PROT SERPL-MCNC: 7.2 G/DL (ref 6–8.4)
RBC # BLD AUTO: 4.7 M/UL (ref 4–5.4)
SODIUM SERPL-SCNC: 142 MMOL/L (ref 136–145)
WBC # BLD AUTO: 6.57 K/UL (ref 3.9–12.7)

## 2024-10-14 PROCEDURE — 85025 COMPLETE CBC W/AUTO DIFF WBC: CPT | Performed by: STUDENT IN AN ORGANIZED HEALTH CARE EDUCATION/TRAINING PROGRAM

## 2024-10-14 PROCEDURE — 1126F AMNT PAIN NOTED NONE PRSNT: CPT | Mod: CPTII,S$GLB,, | Performed by: STUDENT IN AN ORGANIZED HEALTH CARE EDUCATION/TRAINING PROGRAM

## 2024-10-14 PROCEDURE — 99999 PR PBB SHADOW E&M-EST. PATIENT-LVL IV: CPT | Mod: PBBFAC,,, | Performed by: STUDENT IN AN ORGANIZED HEALTH CARE EDUCATION/TRAINING PROGRAM

## 2024-10-14 PROCEDURE — 1101F PT FALLS ASSESS-DOCD LE1/YR: CPT | Mod: CPTII,S$GLB,, | Performed by: STUDENT IN AN ORGANIZED HEALTH CARE EDUCATION/TRAINING PROGRAM

## 2024-10-14 PROCEDURE — 3288F FALL RISK ASSESSMENT DOCD: CPT | Mod: CPTII,S$GLB,, | Performed by: NURSE PRACTITIONER

## 2024-10-14 PROCEDURE — 99214 OFFICE O/P EST MOD 30 MIN: CPT | Mod: S$GLB,,, | Performed by: NURSE PRACTITIONER

## 2024-10-14 PROCEDURE — 99215 OFFICE O/P EST HI 40 MIN: CPT | Mod: S$GLB,,, | Performed by: STUDENT IN AN ORGANIZED HEALTH CARE EDUCATION/TRAINING PROGRAM

## 2024-10-14 PROCEDURE — 3078F DIAST BP <80 MM HG: CPT | Mod: CPTII,S$GLB,, | Performed by: STUDENT IN AN ORGANIZED HEALTH CARE EDUCATION/TRAINING PROGRAM

## 2024-10-14 PROCEDURE — 1125F AMNT PAIN NOTED PAIN PRSNT: CPT | Mod: CPTII,S$GLB,, | Performed by: NURSE PRACTITIONER

## 2024-10-14 PROCEDURE — 1159F MED LIST DOCD IN RCRD: CPT | Mod: CPTII,S$GLB,, | Performed by: STUDENT IN AN ORGANIZED HEALTH CARE EDUCATION/TRAINING PROGRAM

## 2024-10-14 PROCEDURE — 1101F PT FALLS ASSESS-DOCD LE1/YR: CPT | Mod: CPTII,S$GLB,, | Performed by: NURSE PRACTITIONER

## 2024-10-14 PROCEDURE — G2211 COMPLEX E/M VISIT ADD ON: HCPCS | Mod: S$GLB,,, | Performed by: STUDENT IN AN ORGANIZED HEALTH CARE EDUCATION/TRAINING PROGRAM

## 2024-10-14 PROCEDURE — 3077F SYST BP >= 140 MM HG: CPT | Mod: CPTII,S$GLB,, | Performed by: STUDENT IN AN ORGANIZED HEALTH CARE EDUCATION/TRAINING PROGRAM

## 2024-10-14 PROCEDURE — 80053 COMPREHEN METABOLIC PANEL: CPT | Performed by: STUDENT IN AN ORGANIZED HEALTH CARE EDUCATION/TRAINING PROGRAM

## 2024-10-14 PROCEDURE — 99999 PR PBB SHADOW E&M-EST. PATIENT-LVL III: CPT | Mod: PBBFAC,,, | Performed by: NURSE PRACTITIONER

## 2024-10-14 PROCEDURE — 83036 HEMOGLOBIN GLYCOSYLATED A1C: CPT | Performed by: STUDENT IN AN ORGANIZED HEALTH CARE EDUCATION/TRAINING PROGRAM

## 2024-10-14 PROCEDURE — 3079F DIAST BP 80-89 MM HG: CPT | Mod: CPTII,S$GLB,, | Performed by: NURSE PRACTITIONER

## 2024-10-14 PROCEDURE — 3077F SYST BP >= 140 MM HG: CPT | Mod: CPTII,S$GLB,, | Performed by: NURSE PRACTITIONER

## 2024-10-14 PROCEDURE — 3288F FALL RISK ASSESSMENT DOCD: CPT | Mod: CPTII,S$GLB,, | Performed by: STUDENT IN AN ORGANIZED HEALTH CARE EDUCATION/TRAINING PROGRAM

## 2024-10-14 PROCEDURE — 36415 COLL VENOUS BLD VENIPUNCTURE: CPT | Performed by: STUDENT IN AN ORGANIZED HEALTH CARE EDUCATION/TRAINING PROGRAM

## 2024-10-14 RX ORDER — METHOCARBAMOL 750 MG/1
750 TABLET, FILM COATED ORAL 2 TIMES DAILY PRN
Qty: 30 TABLET | Refills: 0 | Status: SHIPPED | OUTPATIENT
Start: 2024-10-14

## 2024-10-14 RX ORDER — DULOXETIN HYDROCHLORIDE 60 MG/1
60 CAPSULE, DELAYED RELEASE ORAL NIGHTLY
Qty: 90 CAPSULE | Refills: 3 | Status: SHIPPED | OUTPATIENT
Start: 2024-10-14

## 2024-10-14 RX ORDER — CARVEDILOL 6.25 MG/1
6.25 TABLET ORAL 2 TIMES DAILY WITH MEALS
Qty: 180 TABLET | Refills: 3 | Status: SHIPPED | OUTPATIENT
Start: 2024-10-14 | End: 2025-10-14

## 2024-10-14 NOTE — PROGRESS NOTES
Subjective:       Patient ID: Karis Briceño is a 84 y.o. female.    Chief Complaint: Essential hypertension [I10]    Patient is established with me, here today for the following:    History of Present Illness      HYPERTENSION:  She reports elevated blood pressure readings, with recent measurements of 136/80 and 140/80 at school. She is currently taking Amlodipine, Valsartan, Carvedilol, and Hydrochlorothiazide for blood pressure management. She takes her blood pressure medications as prescribed. Blood pressure is monitored by the school nurse, but she does not regularly check at home.    BACK PAIN AND SCIATICA:  She reports persistent back pain despite recent interventions, including an in office piriformis injection, ineffective. An upcoming procedure is scheduled for November, targeting an area slightly higher than previous injections. She describes sciatica symptoms, including pain radiating down the back of her left leg and numbness in part of her foot. She notes toe movement corresponds with back sensation. She has transitioned from a short cane to a tall cane due to excessive bending, but still reports difficulty maintaining proper posture and tends to bend forward while walking.    MEDICATIONS:  She discontinued gabapentin due to increased fatigue and decreased motivation. She has been started on Lyrica as an alternative and a muscle relaxer has been prescribed. She expresses hope that the new regimen will improve her energy levels and overall functioning. She inquires about continuing prescription vitamin D supplements (previously taken once weekly on Sundays) or switching to OTC options.    CARDIOLOGY:  She has a follow-up visit scheduled with Dr. Owens in December for monitoring of her aortic valve. She denies experiencing chest pain or trouble breathing.    PRE-DIABETES:  She expresses concern about her pre-diabetes status. Her most recent A1C level is 6.4, just below the diagnostic threshold for  diabetes.    VACCINATIONS:  She expresses interest in receiving the latest COVID-19 vaccine, reporting four previous COVID-19 vaccinations. She is considering the RSV vaccine after learning about its availability and recommendations for adults over 60. She denies interest in the flu vaccine, citing a previous experience of ann-marie influenza following vaccination.    SOCIAL HISTORY:  She reports her daughter is currently hospitalized, causing significant stress. 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 This routine is affecting her sleep, as she is getting home late at night and waking up early at 5:30 AM, contributing to overall fatigue.      ROS:  General: +fatigue  Cardiovascular: -chest pain  Respiratory: -shortness of breath  Musculoskeletal: -joint pain, -muscle pain, +back pain          Health maintenance -   Never previously had colorectal cancer screening, declines screening.  Denies family history of colorectal cancer.  Denies family history of breast cancer.  Denies family history of ovarian cancer.  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   Completed DEXA in DEC2022.  Showed normal bone density.  Family history of cardiac disease.  UTD on Tdap, COVID primary/bivalent, PPSV23, PCV13 vaccinations.  Due for RSV, shingles, COVID, influenza vaccinations.  Never smoker.  Denies drug use.  Completed HIV and hepatitis C screening.           HTN -   Currently prescribed amlodipine, carvedilol, valsartan, HCTZ.   Lab Results   Component Value Date    MICALBCREAT 12.8 05/22/2024     BP Readings from Last 5 Encounters:   10/14/24 (!) 150/70   10/14/24 (!) 167/85   08/08/24 (!) 165/85   07/09/24 (!) 144/80   06/18/24 (!) 172/94      History of breast cancer -   Noted left breast lump in OCT2021  Underwent bilateral breast biopsy DEC2021, pathology with right breast infiltrating ductal carcinoma and left breast fibroadenoma  Elected not to undergo lumpectomy    Prescribed anastrozole, started medication in JAN2023  Following with Dr. Olvera for oncology    Following with Dr. Song routinely for pain management.     HLD -   Endorses taking atorvastatin as directed  Lab Results   Component Value Date    CHOL 220 (H) 05/22/2024     Lab Results   Component Value Date    TRIG 119 05/22/2024     Lab Results   Component Value Date    LDLCALC 130.2 05/22/2024     Lab Results   Component Value Date    HDL 66 05/22/2024      Prediabetes -   Lab Results   Component Value Date    HGBA1C 6.4 (H) 05/22/2024    HGBA1C 6.2 (H) 11/08/2022    HGBA1C 6.1 (H) 03/04/2022           Vitamin D deficiency -  Lab Results   Component Value Date    MQPPVKRE50CK 28 (L) 05/22/2024      Echo MAY2024 with EF 60-65%, grade I DD, moderate aortic stenosis  Following with Dr. Owens for cardiology.      Current Outpatient Medications   Medication Instructions    amLODIPine (NORVASC) 10 mg, Oral, Daily    anastrozole (ARIMIDEX) 1 mg, Oral, Daily    aspirin (ECOTRIN) 81 mg, Oral, Daily    atorvastatin (LIPITOR) 40 mg, Oral, Daily    carvediloL (COREG) 6.25 mg, Oral, 2 times daily with meals    diazePAM (VALIUM) 2 MG tablet On call to MRI may repeat X 3 do not drive to or from MRI/procedure & for 24 hours    diclofenac sodium (VOLTAREN) 2 g, Topical (Top), 4 times daily PRN    DULoxetine (CYMBALTA) 60 mg, Oral, Daily    DULoxetine (CYMBALTA) 60 mg, Oral, Nightly    fish oil-omega-3 fatty acids 300-1,000 mg capsule 1 capsule, Daily    hydroCHLOROthiazide (HYDRODIURIL) 25 mg, Oral, Daily    ketorolac (TORADOL) 10 mg, Oral, Every 6 hours PRN    LIDOcaine (LIDODERM) 5 % 1 patch, Transdermal, Daily, Remove & Discard patch within 12 hours or as directed by MD    methocarbamoL (ROBAXIN) 750 mg, Oral, 2 times daily PRN    valsartan (DIOVAN) 320 mg, Oral, Daily    VITAMIN D2 50,000 Units, Oral, Every 7 days     Objective:      Vitals:    10/14/24 1057   BP: (!) 150/70   Pulse: 87   SpO2: 97%   Weight: 82.2 kg (181 lb  3.5 oz)   PainSc: 0-No pain     Body mass index is 29.25 kg/m².    Physical Exam  Vitals reviewed.   Constitutional:       General: She is not in acute distress.     Appearance: Normal appearance. She is not ill-appearing or diaphoretic.   HENT:      Head: Normocephalic and atraumatic.      Right Ear: Tympanic membrane, ear canal and external ear normal. There is no impacted cerumen.      Left Ear: Tympanic membrane, ear canal and external ear normal. There is no impacted cerumen.      Nose: Nose normal. No rhinorrhea.      Mouth/Throat:      Mouth: Mucous membranes are moist.      Pharynx: Oropharynx is clear. No oropharyngeal exudate or posterior oropharyngeal erythema.   Eyes:      General: No scleral icterus.        Right eye: No discharge.         Left eye: No discharge.      Conjunctiva/sclera: Conjunctivae normal.   Neck:      Thyroid: No thyromegaly or thyroid tenderness.      Trachea: Trachea normal.   Cardiovascular:      Rate and Rhythm: Normal rate and regular rhythm.      Heart sounds: Murmur heard.      Systolic murmur is present with a grade of 3/6.      No friction rub. No gallop.   Pulmonary:      Effort: Pulmonary effort is normal. No respiratory distress.      Breath sounds: Normal breath sounds. No stridor. No wheezing, rhonchi or rales.   Abdominal:      General: There is no distension.      Palpations: Abdomen is soft.      Tenderness: There is no abdominal tenderness. There is no guarding or rebound.   Musculoskeletal:         General: No swelling or deformity.      Cervical back: Neck supple.   Lymphadenopathy:      Head:      Right side of head: No submandibular or posterior auricular adenopathy.      Left side of head: No submandibular or posterior auricular adenopathy.      Cervical: No cervical adenopathy.      Right cervical: No superficial, deep or posterior cervical adenopathy.     Left cervical: No superficial, deep or posterior cervical adenopathy.      Upper Body:      Right upper  body: No supraclavicular adenopathy.      Left upper body: No supraclavicular adenopathy.   Skin:     General: Skin is warm and dry.   Neurological:      General: No focal deficit present.      Mental Status: She is alert. Mental status is at baseline.      Gait: Gait normal.   Psychiatric:         Mood and Affect: Mood normal.         Behavior: Behavior normal.         Assessment:       1. Essential hypertension    2. Malignant neoplasm of lower-outer quadrant of left breast of female, estrogen receptor positive    3. Chronic right-sided low back pain with right-sided sciatica    4. Pain of left hip    5. Mixed hyperlipidemia    6. Prediabetes    7. Vitamin D deficiency    8. Nonrheumatic aortic valve stenosis    9. Health maintenance examination    10. Need for vaccination        Plan:   Essential hypertension  -     carvediloL (COREG) 6.25 MG tablet; Take 1 tablet (6.25 mg total) by mouth 2 (two) times daily with meals.  Dispense: 180 tablet; Refill: 3    Malignant neoplasm of lower-outer quadrant of left breast of female, estrogen receptor positive    Chronic right-sided low back pain with right-sided sciatica  -     DULoxetine (CYMBALTA) 60 MG capsule; Take 1 capsule (60 mg total) by mouth every evening.  Dispense: 90 capsule; Refill: 3    Pain of left hip    Mixed hyperlipidemia    Prediabetes  -     Comprehensive Metabolic Panel; Future; Expected date: 10/14/2024  -     Hemoglobin A1C; Future; Expected date: 10/14/2024  -     CBC Auto Differential; Future; Expected date: 10/14/2024    Vitamin D deficiency    Nonrheumatic aortic valve stenosis    Health maintenance examination    Need for vaccination      Assessment & Plan    Assessed blood pressure, which remains elevated despite current regimen  Evaluated A1C trend, noting it is approaching diabetic range at 6.4%  Considered adjusting carvedilol dosage to improve blood pressure control  Reviewed recent cardiology follow-up with Dr. Owens; aortic valve being  monitored  Assessed effectiveness of current pain management for sciatica  Supported pain management specialist's plan   Agreed with change from gabapentin to Lyrica for neuropathic pain management    DIABETES MANAGEMENT:  - Explained importance of reducing starchy carbohydrates and increasing non-starchy vegetables for better blood sugar control.  - Discussed benefits of whole grains over refined grains.  - Provided examples of starchy vegetables to limit (potatoes, peas, corn) and non-starchy alternatives.  - Recommend ideal plate composition: 25% protein, 25% starch, 50% non-starchy vegetables.  - Karis to increase non-starchy vegetable intake to 50% of meal composition.  - Karis to limit intake of starchy vegetables and refined carbohydrates.  - Karis to choose whole grain options over refined grains when consuming carbohydrates.  - Recommend preparing meals at home more often to control ingredients and portion sizes.  - Ordered non-fasting labs including A1C to be completed within the next month.    NEUROPATHIC PAIN:  - Explained the potential benefits of Lyrica over gabapentin for nerve pain.  - Started Lyrica for neuropathic pain, to be taken in the morning.  - Discontinued gabapentin due to sedating side effects.    MUSCLE PAIN:  - Started methocarbamol for muscle relaxation, to be taken at night.    HYPERTENSION:  - Increased carvedilol for blood pressure management.  - Continued amlodipine, valsartan, and hydrochlorothiazide for blood pressure management.  - Patient to call office with blood pressure readings.  - Contact the office if blood pressure remains elevated based on nurse's readings.    DEPRESSION:  - Continued duloxetine 60mg daily, to be taken at night.    RSV VACCINATION:  - Educated on the new RSV vaccine recommendation for adults over 60.    VITAMIN D SUPPLEMENTATION:  - Recommend switching from prescription vitamin D (weekly dosing) to OTC vitamin D 8859-0077 IU daily after current supply  is finished.    LABS:  - Ordered labs to be completed today if patient chooses.    FOLLOW UP:  - Follow up in February.  - Follow up with Dr. Owens (cardiology) on December 23rd as scheduled.  - Follow up with Dr. Olvera in November.         41 minutes were spent in chart review, documentation and review of results, and evaluation, treatment, and counseling of patient on the same day of service.    Oly Roque MD  10/14/2024

## 2024-10-14 NOTE — PATIENT INSTRUCTIONS
I recommend starting a daily vitamin D3 (cholecalciferol) 2,000-3,000 IU supplement. This can be obtained without a prescription at most pharmacies or grocery stores. We can recheck your vitamin D at a later time.

## 2024-10-14 NOTE — PROGRESS NOTES
"Chronic Pain - Established Patient      Chief Complaint:   Chief Complaint   Patient presents with    Follow-up    Hip Pain        SUBJECTIVE:    Interval History 10/14/2024:  The patient returns to clinic today for follow up of back and hip pain. She is s/p left piriformis muscle injection on 8/8/2024. She reports no relief. She reports low back pain that radiates into the posterolateral aspect of her left leg to the foot. She denies any right leg pain. Her pain is worse with walking. She is frustrated with her pain. She is no longer taking Gabapentin. She is taking Cymbalta with limited relief. She ran out of Robaxin. She denies any other health changes. Her pain today is 9/10.     Interval History 7/9/2024:  Karis Briceño returns to clinic s/p Left L5/S1 and S1 TFESI on 6/18/2024. She reports 100% relief of her back pain. Today, she reports numbness to the bottom of her left foot. She also reports left buttock pain that goes to the posterior thigh. She continues Gabapentin 300 mg TID. She reports some drowsiness during the day. She denies weakness or recent falls. She continues Physical Therapy, but does not continue HEP.  The patient denies fever/night sweats, urinary incontinence, bowel incontinence, significant weight changes, significant motor weakness or changes, or loss of sensations. Her pain today is 6/10.    Interval HPI 6/6/24:  Karis Briceño returns to clinic for follow up. LCV ordered open MRI and CT abd and pelvis. Continued with PT and on gabapentin and robaxin for pain. Today reports pain that localizes to her left posterior hamstring with occasional back pain and left buttock pain with associated numbness on the top and bottom of her left foot. She also reports some "locking". When she ADF and APF her foot, she feels pain in her posterior hamstring. She ambulates with a cane and reports some weakness in her LLE. Pain is a 4/10. Made worse by walking. Improves with sitting and rest. She reports " offloading her LLE. She reports stopping gabapentin. She reports some sleepiness in the afternoon. She ran out of robaxin. She feels that this helped. She has stopped going to PT due to foot numbness.     Initial HPI 5/22/24:  Karis Briceño presents to the clinic for the evaluation of left buttock and thigh pain. The pain started 3 weeks ago without inciting event and symptoms have been unchanged.The pain is located in the left buttock and posterior thigh area and radiates to the left knee.  The pain is described as sharp and tingling and is rated as 7/10. The pain is rated with a score of  5/10 on the BEST day and a score of 8/10 on the WORST day.  Symptoms interfere with daily activity. The pain is exacerbated by Standing, Touching, Walking, and Extension.  The pain is mitigated by medications. She reports paraesthesia along her lateral LLE and lateral foot. She takes Gabapentin 300mg morning and 600mg QHS and BC powder with some relief.  She went to the ED 3 weeks ago and received Steroid injection, toradol injection, lidocaine patch, and Robaxin. Had previous Right L4/L5 TF PRASAD cancelled in 11/2023 due to anxiousness about procedure.    Patient denies night fever/night sweats, urinary incontinence, bowel incontinence, significant weight loss, significant motor weakness.    Physical Therapy/Home Exercise: no      Pain Disability Index Review:      10/14/2024     9:25 AM 8/8/2024     3:52 PM 7/9/2024     2:20 PM   Last 3 PDI Scores   Pain Disability Index (PDI) 63 [phone]       Pain Medications:  Gabapentin  BC arthritis powder     report:  Reviewed and consistent with medication use as prescribed.    Pain Procedures:   6/18/2024 - Left L5/S1 and S1 TFESI  8/8/2024- Left piriformis muscle injection- no relief    Imaging:   MRI Lumbar Spine 5/28/2024 ([facility]):  Findings:  There is 3 mm anterior offset of L3 relative to L4.     No acute compressions fracture. No pars defects are identified.     Modic type 2  discogenic edema is evident most notably at L5-S1.     T12-L1: the spinal canal and neural foramen are patent. There is no disc bulge or herniation. The disc is hydrated without loss of height.     L1-2: the spinal canal and neural foramen are patent. There is no disc bulge or herniation. The disc is hydrated without loss of height.     L2-3: there are bilateral subarticular disc herniations, larger right, depth of 4.1 mm. There is flattening of the thecal sac contour across the right paracentral zone, combined with facet hypertrophy producing moderate right foraminal stenosis. Ligamentum flavum thickening is evident with mild canal stenosis. The disc is partially desiccated.     L3-4: a concentric, up to 3 mm depth disc bulge is evident exacerbated by anterior offset of L3. Combined with ligamentum flavum thickening, short pedicles and facet hypertrophy, there is moderate spinal canal and moderate right greater than left foraminal stenosis.     L4-5: A broad-based central/paracentral 6.2 mm disc herniation is present. There is severe flattening deformity of the thecal sac, residual AP diameter measuring 3 mm. The AP diameter of the spinal canal measures 8 mm. Associated peripheral spondylosis, bulging disc and facet hypertrophy produces moderate left greater than right foraminal stenosis.     L5-S1: a broad-based central/asymmetric left paracentral 5.4 mm disc herniation is present with extension into the foraminal zone. There is asymmetric posterior displacement of the descending left S1 nerve root. The AP diameter of the canal is mildly narrowed. There is severe left greater than right foraminal stenosis, bilateral facet hypertrophy with effusions are present.         CT Lumbar spine FINDINGS: 9/2023  Alignment: Grade 1 anterolisthesis of L3 on L4.     Vertebrae: No acute fracture.  Multilevel degenerative endplate changes.  No suspicious lytic or blastic lesions.     Discs: Degenerative disc space narrowing  extending from L2-L3 through L5-S1.  Vacuum phenomenon at L2-L3, L4-L5 and L5-S1.  No endplate erosion.     Sacroiliac joints: Symmetric degenerative changes.     Degenerative findings:     T12-L1: No spinal canal stenosis.  No neural foraminal narrowing.     L1-L2: No spinal canal stenosis.  No neural foraminal narrowing     L2-L3: Circumferential disc bulge.  Bilateral facet arthropathy, bilateral ligamentum flavum hypertrophy and prominent posterior epidural fat.  Moderate spinal canal and lateral recess stenosis.  Moderate right and mild left neural foraminal narrowing.     L3-L4: Grade 1 anterolisthesis with uncovering the intervertebral disc.  Circumferential disc bulge.  Bilateral facet arthropathy, bilateral ligamentum flavum hypertrophy and prominent posterior epidural fat.  Severe spinal canal and lateral recess stenosis.  Moderate bilateral neural foraminal narrowing.     L4-L5: Circumferential disc bulge with a superimposed posterior central protrusion.  Bilateral facet arthropathy, bilateral ligamentum flavum hypertrophy and prominent posterior epidural fat.  Severe spinal canal and lateral recess stenosis.  Severe left and moderate to severe right neural foraminal narrowing.     L5-S1: Circumferential disc bulge.  Bilateral facet arthropathy, bilateral ligamentum flavum hypertrophy and prominent posterior epidural fat.  Moderate spinal canal stenosis.  Severe bilateral neural foraminal narrowing.     Paraspinal muscles & soft tissues: Multiple calcified gallstones.  0.7 cm calcified left renal stone.  0.9 cm left renal cortical lesion that demonstrates attenuation values higher than that of a simple cyst.  Trace atherosclerotic calcification of the abdominal aorta and iliac arteries.  Mild fatty degeneration of the posterior paraspinal musculature.     Impression:  Advanced lumbar spondylosis most severe from L2-L3 through L5-S1 as detailed above.  Cholelithiasis.  Left nonobstructing  nephrolithiasis.  0.9 cm left renal cortical lesion that does not meet CT criteria for a simple cyst.  Recommend further characterization with a dedicated renal ultrasound.    LXR FINDINGS: 7/2023  No evidence of an acute fracture or dislocation.  Alignment is maintained.  Intervertebral disc height loss worse at inferior lumbar levels.     Impression:     Degenerative change at inferior lumbar levels without instability.    Past Medical History:   Diagnosis Date    Breast cancer 12/20/2021    Left breast IDC    Gallstone pancreatitis 09/2016    Gallstones     Hyperlipidemia     Hypertension      Past Surgical History:   Procedure Laterality Date    BREAST BIOPSY Bilateral 12/20/2021    Right side benign, Left side IDC    HYSTERECTOMY      BROOKS 3, AUB    TRANSFORAMINAL EPIDURAL INJECTION OF STEROID Left 6/18/2024    Procedure: LUMBAR TRANSFORAMINAL LEFT L5/S1 AND S1;  Surgeon: Bernard Song MD;  [place] [facility] [place]  Service: Pain Management;  Laterality: Left;  [phone]  2 WK F/U CHRISTINA     Social History     Socioeconomic History    Marital status: Single   Tobacco Use    Smoking status: Never    Smokeless tobacco: Never   Substance and Sexual Activity    Alcohol use: Yes     Comment: socially    Drug use: No    Sexual activity: Not Currently     Family History   Problem Relation Name Age of Onset    No Known Problems Mother      Heart attack Father      Coronary artery disease Father      Coronary artery disease Sister      Heart attack Sister      Skin cancer Neg Hx      Colon cancer Neg Hx      Breast cancer Neg Hx      Ovarian cancer Neg Hx         Review of patient's allergies indicates:  No Known Allergies    Current Outpatient Medications   Medication Sig    amLODIPine (NORVASC) 10 MG tablet Take 1 tablet (10 mg total) by mouth once daily.    anastrozole (ARIMIDEX) 1 mg Tab Take 1 tablet (1 mg total) by mouth once daily.    atorvastatin (LIPITOR) 40 MG tablet Take 1 tablet (40 mg total) by mouth once  daily.    carvediloL (COREG) 3.125 MG tablet Take 1 tablet (3.125 mg total) by mouth 2 (two) times daily with meals.    diazePAM (VALIUM) 2 MG tablet On call to MRI may repeat X 3 do not drive to or from MRI/procedure & for 24 hours    diclofenac sodium (VOLTAREN) 1 % Gel Apply 2 g topically 4 (four) times daily as needed.    DULoxetine (CYMBALTA) 60 MG capsule Take 1 capsule (60 mg total) by mouth once daily.    ergocalciferol (ERGOCALCIFEROL) 50,000 unit Cap Take 1 capsule (50,000 Units total) by mouth every 7 days.    fish oil-omega-3 fatty acids 300-1,000 mg capsule Take 1 capsule by mouth once daily.    gabapentin (NEURONTIN) 300 MG capsule Take 300 mg by mouth 3 (three) times daily.    hydroCHLOROthiazide (HYDRODIURIL) 25 MG tablet Take 1 tablet (25 mg total) by mouth once daily.    ketorolac (TORADOL) 10 mg tablet Take 1 tablet (10 mg total) by mouth every 6 (six) hours as needed for Pain.    LIDOcaine (LIDODERM) 5 % Place 1 patch onto the skin once daily. Remove & Discard patch within 12 hours or as directed by MD    methocarbamoL (ROBAXIN) 750 MG Tab Take 1 tablet (750 mg total) by mouth 2 (two) times daily as needed (muscle spasm/back pain).    valsartan (DIOVAN) 320 MG tablet Take 1 tablet (320 mg total) by mouth once daily.    aspirin (ECOTRIN) 81 MG EC tablet Take 1 tablet (81 mg total) by mouth once daily. (Patient not taking: Reported on 6/23/2022)     No current facility-administered medications for this visit.       REVIEW OF SYSTEMS:    GENERAL:  No weight loss, malaise or fevers.  HEENT:  Negative for frequent or significant headaches.  NECK:  Negative for lumps, goiter, pain and significant neck swelling.  RESPIRATORY:  Negative for cough, wheezing or shortness of breath.  CARDIOVASCULAR:  Negative for chest pain, leg swelling or palpitations.  GI:  Negative for abdominal discomfort, blood in stools or black stools or change in bowel habits.  MUSCULOSKELETAL:  See HPI. Left buttock pain and  posterior leg pain.  SKIN:  Negative for lesions, rash, and itching.  PSYCH:  Negative for sleep disturbance, mood disorder and recent psychosocial stressors.  HEMATOLOGY/LYMPHOLOGY:  Negative for prolonged bleeding, bruising easily or swollen nodes.  NEURO:   No history of headaches, syncope, paralysis, seizures or tremors. Radiating pain down to left knee. Paraesthesia in lateral leg and foot.  All other reviewed and negative other than HPI.    OBJECTIVE:    BP (!) 167/85   Pulse 76   Temp 98.4 °F (36.9 °C)   Wt 81.9 kg (180 lb 8.9 oz)   SpO2 99%   BMI 29.14 kg/m²     PHYSICAL EXAMINATION:     General appearance: Well appearing, in no acute distress, alert and oriented x3.  Psych:  Mood and affect appropriate.  Skin: Skin color, texture, turgor normal, no rashes or lesions, in both upper and lower body.  Head/face:  Normocephalic, atraumatic.   Cor: Rate regular.   Pulm: Bilateral chest rise. In no apparent respiratory distress.   Back: Straight leg raise is negative for radicular pain bilaterally. There is mild pain with palpation over left lumbar facet joints. Limited ROM with pain on flexion and extension.   Extremities: No deformities, edema, or skin discoloration. Good capillary refill.  Musculoskeletal: Bilateral lower extremity strength is normal and symmetric.  No atrophy or tone abnormalities are noted.   Neuro:  Diminished sensation along left lower leg and plantar aspect of foot.   Gait: Antalgic-ambulates with single point cane.        ASSESSMENT: 84 y.o. year old female with low back and leg pain, consistent with the followin. Chronic pain syndrome        2. Lumbar radiculopathy  methocarbamoL (ROBAXIN) 750 MG Tab    Procedure Order to Pain Management      3. Neuroforaminal stenosis of lumbar spine        4. Lumbar spondylosis        5. Myofascial pain  methocarbamoL (ROBAXIN) 750 MG Tab              PLAN:     - Previous imaging was reviewed and discussed with the patient today. Labs  reviewed.     - Schedule for left L4/5 and L5/S1 TF PRASAD.     - Continue Cymbalta.     - Trial Lyrica 75 mg daily.     - Continue Robaxin 750 mg BID PRN muscle pain. Refill provided.     - Continue home exercise routine.     - RTC 2 weeks after above procedure.     The above plan and management options were discussed at length with patient. Patient is in agreement with the above and verbalized understanding.      Calli Gillette NP   10/14/2024

## 2024-10-17 ENCOUNTER — TELEPHONE (OUTPATIENT)
Dept: INTERNAL MEDICINE | Facility: CLINIC | Age: 84
End: 2024-10-17
Payer: MEDICARE

## 2024-10-17 VITALS — SYSTOLIC BLOOD PRESSURE: 130 MMHG | DIASTOLIC BLOOD PRESSURE: 70 MMHG

## 2024-10-17 NOTE — TELEPHONE ENCOUNTER
----- Message from Bryn sent at 10/17/2024 10:35 AM CDT -----  Contact: Jana  Type:  Patient Call          Who Called: Patient         Does the patient know what this is regarding?: Requesting a call back about reporting her blood pressure ; 130/70 on today ; please advise           Would the patient rather a call back or a response via [facility]?call           Best Call Back Number: [phone]             Additional Information:

## 2024-10-22 ENCOUNTER — TELEPHONE (OUTPATIENT)
Dept: INTERNAL MEDICINE | Facility: CLINIC | Age: 84
End: 2024-10-22
Payer: MEDICARE

## 2024-11-05 ENCOUNTER — OFFICE VISIT (OUTPATIENT)
Dept: HEMATOLOGY/ONCOLOGY | Facility: CLINIC | Age: 84
End: 2024-11-05
Payer: MEDICARE

## 2024-11-05 VITALS
DIASTOLIC BLOOD PRESSURE: 86 MMHG | WEIGHT: 181.88 LBS | SYSTOLIC BLOOD PRESSURE: 154 MMHG | TEMPERATURE: 98 F | HEIGHT: 66 IN | HEART RATE: 87 BPM | OXYGEN SATURATION: 98 % | RESPIRATION RATE: 18 BRPM | BODY MASS INDEX: 29.23 KG/M2

## 2024-11-05 DIAGNOSIS — Z17.0 MALIGNANT NEOPLASM OF LOWER-OUTER QUADRANT OF LEFT BREAST OF FEMALE, ESTROGEN RECEPTOR POSITIVE: ICD-10-CM

## 2024-11-05 DIAGNOSIS — C50.512 MALIGNANT NEOPLASM OF LOWER-OUTER QUADRANT OF LEFT BREAST OF FEMALE, ESTROGEN RECEPTOR POSITIVE: ICD-10-CM

## 2024-11-05 PROCEDURE — 3077F SYST BP >= 140 MM HG: CPT | Mod: CPTII,S$GLB,, | Performed by: INTERNAL MEDICINE

## 2024-11-05 PROCEDURE — 1125F AMNT PAIN NOTED PAIN PRSNT: CPT | Mod: CPTII,S$GLB,, | Performed by: INTERNAL MEDICINE

## 2024-11-05 PROCEDURE — 3288F FALL RISK ASSESSMENT DOCD: CPT | Mod: CPTII,S$GLB,, | Performed by: INTERNAL MEDICINE

## 2024-11-05 PROCEDURE — 1159F MED LIST DOCD IN RCRD: CPT | Mod: CPTII,S$GLB,, | Performed by: INTERNAL MEDICINE

## 2024-11-05 PROCEDURE — 99215 OFFICE O/P EST HI 40 MIN: CPT | Mod: S$GLB,,, | Performed by: INTERNAL MEDICINE

## 2024-11-05 PROCEDURE — 1101F PT FALLS ASSESS-DOCD LE1/YR: CPT | Mod: CPTII,S$GLB,, | Performed by: INTERNAL MEDICINE

## 2024-11-05 PROCEDURE — 99999 PR PBB SHADOW E&M-EST. PATIENT-LVL IV: CPT | Mod: PBBFAC,,, | Performed by: INTERNAL MEDICINE

## 2024-11-05 PROCEDURE — 3079F DIAST BP 80-89 MM HG: CPT | Mod: CPTII,S$GLB,, | Performed by: INTERNAL MEDICINE

## 2024-11-05 PROCEDURE — G2211 COMPLEX E/M VISIT ADD ON: HCPCS | Mod: S$GLB,,, | Performed by: INTERNAL MEDICINE

## 2024-11-05 RX ORDER — ANASTROZOLE 1 MG/1
1 TABLET ORAL DAILY
Qty: 90 TABLET | Refills: 3 | Status: SHIPPED | OUTPATIENT
Start: 2024-11-05 | End: 2025-11-05

## 2024-11-05 NOTE — PROGRESS NOTES
[facility] Note:      Chief Complaint:   Encounter Diagnosis   Name Primary?    Malignant neoplasm of lower-outer quadrant of left breast of female, estrogen receptor positive             Cancer Staging   Malignant neoplasm of lower-outer quadrant of left breast of female, estrogen receptor positive  Staging form: Breast, AJCC 8th Edition  - Clinical stage from 12/20/2021: cT2, cN0, cM0, G1, ER+, WI: Not Assessed, HER2: Not Assessed - Signed by Aliza Olvera MD on 1/6/2022        HPI:  Karis Briceño is a 84 y.o. female who presents today for follow up of breast cancer on endocrine therapy. She is tolerating anastrazole well without any issues. Patient has opted against surgery and will continue on Anastrazole. She is doing well but has noticed increased asymmetry in the breasts. Feels that the L breast is larger than the R. No new pains. No symptomatic concerns    Oncology History  Patient felt a mass in the left breast in October 2021  Diagnostic mammogram 10/25/21 demonstrated bilateral breast masses with recommendation for biopsy  Bilateral breast biopsy 12/20/21 showed a R breast IDC, grade 1, diffuse staining for estrogen and a L breast fibroadenoma   Patient's pathology was initially sent to [facility], therefore no receptors were initially run    Patient has a history of HRT which she took for 10 years. She stopped these >10 years ago.         Patient Active Problem List   Diagnosis    Essential hypertension    Cholelithiasis    Refused influenza vaccine    Overweight (BMI 25.0-29.9)    Prediabetes    Mixed hyperlipidemia    Poor dentition    Chronic left shoulder pain    Decreased strength of upper extremity    Decreased functional mobility    Impaired range of motion of cervical spine    Malignant neoplasm of lower-outer quadrant of left breast of female, estrogen receptor positive    Nonrheumatic aortic valve stenosis    Chronic right-sided low back pain with  "right-sided sciatica    History of breast cancer    Vitamin D deficiency       Current Outpatient Medications   Medication Instructions    amLODIPine (NORVASC) 10 mg, Oral, Daily    anastrozole (ARIMIDEX) 1 mg, Oral, Daily    aspirin (ECOTRIN) 81 mg, Oral, Daily    atorvastatin (LIPITOR) 40 mg, Oral, Daily    carvediloL (COREG) 6.25 mg, Oral, 2 times daily with meals    diazePAM (VALIUM) 2 MG tablet On call to MRI may repeat X 3 do not drive to or from MRI/procedure & for 24 hours    diclofenac sodium (VOLTAREN) 2 g, Topical (Top), 4 times daily PRN    DULoxetine (CYMBALTA) 60 mg, Oral, Daily    DULoxetine (CYMBALTA) 60 mg, Oral, Nightly    fish oil-omega-3 fatty acids 300-1,000 mg capsule 1 capsule, Daily    hydroCHLOROthiazide (HYDRODIURIL) 25 mg, Oral, Daily    ketorolac (TORADOL) 10 mg, Oral, Every 6 hours PRN    LIDOcaine (LIDODERM) 5 % 1 patch, Transdermal, Daily, Remove & Discard patch within 12 hours or as directed by MD    methocarbamoL (ROBAXIN) 750 mg, Oral, 2 times daily PRN    valsartan (DIOVAN) 320 mg, Oral, Daily    VITAMIN D2 50,000 Units, Oral, Every 7 days       Review of Systems:   Review of Systems   Constitutional: Negative.    HENT: Negative.     Respiratory: Negative.     Cardiovascular: Negative.    Gastrointestinal: Negative.    Musculoskeletal: Negative.    Neurological: Negative.    Endo/Heme/Allergies: Negative.    All other systems reviewed and are negative.      PHYSICAL EXAM:  BP (!) 154/86 (BP Location: Right arm, Patient Position: Sitting)   Pulse 87   Temp 98.2 °F (36.8 °C) (Oral)   Resp 18   Ht 5' 6" (1.676 m)   Wt 82.5 kg (181 lb 14.1 oz)   SpO2 98%   BMI 29.36 kg/m²     General Appearance:    Alert, cooperative, no distress, appears stated age   Lungs:     Clear to auscultation bilaterally, respirations unlabored    Heart:    Regular rate and rhythm, S1 and S2 normal   Breast Exam:    No palpable abnormalities, L breast > R breast     Abdomen:     Soft, non-tender, bowel " sounds active, no masses, no organomegaly   Extremities:   Extremities normal, atraumatic, no cyanosis or edema   Pulses:   2+ and symmetric all extremities   Skin:   Skin color, texture, turgor normal, no rashes or lesions   Lymph nodes:   Cervical, supraclavicular, and axillary nodes normal           Pertinent Labs & Imaging:  Diagnostic mammogram 10/25/21:   Impression:  Left  Mass: Left breast mass at the anterior 6 o'clock position. Assessment: 4 - Suspicious finding. Biopsy is recommended.   Right  Mass: Right breast 6 mm mass at the 10 o'clock position. Assessment: 4 - Suspicious finding. Biopsy is recommended.   BI-RADS Category: Overall: 4 - Suspicious    Bilateral breast ultrasound 12/20/21  1. BREAST, LEFT, 06:00, BIOPSY:   -. Invasive ductal carcinoma, see [facility] Consultative Report below.   - Size of invasive carcinoma:  3 MM in greatest linear dimension within a single core biopsy fragment.   - Aminta Histologic Score: Grade 1 of 3.                     Tubule Formation:  2                     Nuclear Pleomorphism:  2                     Mitotic Activity:  1   - No definitive in-situ component identified.   - No lymphovascular invasion.   - Microcalcifications:  Not identified.   - Breast biomarkers will be performed upon return of materials from [facility].   2. BREAST, RIGHT, 10:00, BIOPSY:   - Benign breast tissue with stromal fibrosis and fibroadenomatoid nodules.   - Microcalcifications:  Not identified.   - Negative for atypia or malignancy.     [facility] FINAL DIAGNOSIS:   Interpretation   Breast, core biopsy (12/20/2021):   1. Left breast, 6:00: Invasive ductal carcinoma with prominent cribriform growth pattern, [place] grade I (of III).   The submitted immunostains for SMA, p63 and CK 5/6 show absence of myoepithelial cell layer in the tumor. The tumor is diffusely positive for ER. These findings support the above diagnosis.   2. Right breast, 10:00: Fibroadenomatoid nodule.     Ultrasound  10/10/22: R breast 4mm x 5mm x 6mm (decreased from 7 mm x 6 mm x 7 mm in June 2022)    No results found for this or any previous visit (from the past 24 hours).    Assessment & Plan:    1. Malignant neoplasm of lower-outer quadrant of left breast of female, estrogen receptor positive  - anastrozole (ARIMIDEX) 1 mg Tab; Take 1 tablet (1 mg total) by mouth once daily.  Dispense: 90 tablet; Refill: 3  - Mammo Digital Diagnostic Bilat; Future      Patient is on with anastrozole, initially as NAET intent, but has deferred surgery Overall she is tolerating this well without major side effects.   Follow up breast imaging on January 2024 showed continued MI to therapy.   Will plan for AI indefinitely. Refill today  Will plan for gabapentin 300mg QAM/ 600mg QPM for sciatic pain     We did discuss that one of the possible side effects while on an AI is bone loss or osteoporosis. To help prevent this possible side effect, we advised that she continue taking daily Calcium and Vitamin D supplements. The daily recommended doses are 1000 IU of Vitamin D and 1200mg of Calcium. She can buy these supplements, such as Oscal-D® or Caltrate®, at most local stores. If she has osteopenia or osteoporosis on bone density, we will discuss Prolia in the near future.     Joint pain is a common side effect of an AI. Pain and aching in the bones and joints can range from mild discomfort that goes away by itself, to severe achiness that may require medication. We have suggested using over-the-counter, non-steroidal anti-inflammatory medications like Ibuprofen if she were to experience this side effect.     Patient is agreeable to this plan.       Med Onc Chart Routing      Follow up with physician 6 months.   Follow up with CHRISTINA    Infusion scheduling note    Injection scheduling note    Labs None   Scheduling:  Preferred lab:  Lab interval:     Imaging Mammogram   January 2025   Pharmacy appointment    Other referrals                  MDM  includes  :    - Acute or chronic illness or injury that poses a threat to life or bodily function  - Independent review and explanation of 1 results from unique tests  - Discussion of management and ordering 1 unique tests  - Extensive discussion of treatment and management  - Prescription drug management  - Drug therapy requiring intensive monitoring for toxicity        Aliza Olvera MD  11/05/2024

## 2024-11-08 ENCOUNTER — TELEPHONE (OUTPATIENT)
Dept: INTERNAL MEDICINE | Facility: CLINIC | Age: 84
End: 2024-11-08
Payer: MEDICARE

## 2024-11-08 NOTE — TELEPHONE ENCOUNTER
----- Message from Abbey sent at 11/8/2024 11:09 AM CST -----  Please call patient back concerning nurse visit appointment on 11-11-24        Arya

## 2024-11-11 ENCOUNTER — TELEPHONE (OUTPATIENT)
Dept: INTERNAL MEDICINE | Facility: CLINIC | Age: 84
End: 2024-11-11

## 2024-11-11 VITALS — SYSTOLIC BLOOD PRESSURE: 138 MMHG | DIASTOLIC BLOOD PRESSURE: 70 MMHG

## 2024-11-11 NOTE — TELEPHONE ENCOUNTER
----- Message from Светлана sent at 11/11/2024 10:14 AM CST -----  Contact: Patient  Type: Patient Call          Who Called: Patient      Does the patient know what this is regarding? Pt is providing BP reading of 9:45 138/70            Does the patient rather a call back or a response via [facility]? call        Best Call Back Number: [phone]         Additional Information:

## 2024-11-15 ENCOUNTER — TELEPHONE (OUTPATIENT)
Dept: INTERNAL MEDICINE | Facility: CLINIC | Age: 84
End: 2024-11-15
Payer: MEDICARE

## 2024-11-15 NOTE — TELEPHONE ENCOUNTER
Pt blood pressure : Monday 160/80 ; Tuesday 158/60 ; Wednesday & Thursday no record ; Friday 138/70 ; please advise

## 2024-11-15 NOTE — TELEPHONE ENCOUNTER
Spoke to patient and she said she haven't been recording her heart rate. Patient was informed to check her blood pressure and record her heart rate on Monday and call our office with the readings. Patient verbalized understanding   Patient said her blood pressure will likely be high because her daughter is in ICU and she's dealing with a lot right now.

## 2024-11-15 NOTE — TELEPHONE ENCOUNTER
What have her heart rates been when checked pressure? Need this in order to give further recommendations   - will likely increase coreg further but need bp readings in order to assess if safe to do so    - please cnotact pt and inquire about heart rate and if not recorded then ask her to record this reading along iwht her pressure and call in readings on Monday

## 2024-11-15 NOTE — TELEPHONE ENCOUNTER
----- Message from Bryn sent at 11/15/2024 12:56 PM CST -----  Contact: patient  Type:  Patient Call          Who Called: Braden Jeannette Tenae         Does the patient know what this is regarding?: Requesting a call back to leave her blood pressure reading Monday 160/80 ; Tuesday 158/60 ; Wednesday & Thursday no record  ; Friday 138/70 ; please advise           Would the patient rather a call back or a response via [facility]?call           Best Call Back Number: [phone]             Additional Information: Pt had a issue with her daughter that's the reason it's no record for Wednesday & Thursday

## 2024-11-21 ENCOUNTER — PATIENT MESSAGE (OUTPATIENT)
Dept: PAIN MEDICINE | Facility: OTHER | Age: 84
End: 2024-11-21
Payer: MEDICARE

## 2024-12-09 ENCOUNTER — PATIENT MESSAGE (OUTPATIENT)
Dept: PAIN MEDICINE | Facility: OTHER | Age: 84
End: 2024-12-09
Payer: MEDICARE

## 2024-12-30 ENCOUNTER — HOSPITAL ENCOUNTER (OUTPATIENT)
Facility: OTHER | Age: 84
Discharge: HOME | End: 2024-12-30
Attending: ANESTHESIOLOGY | Admitting: ANESTHESIOLOGY
Payer: MEDICARE

## 2024-12-30 VITALS
WEIGHT: 182 LBS | RESPIRATION RATE: 18 BRPM | OXYGEN SATURATION: 97 % | BODY MASS INDEX: 29.25 KG/M2 | HEIGHT: 66 IN | TEMPERATURE: 98 F | SYSTOLIC BLOOD PRESSURE: 155 MMHG | DIASTOLIC BLOOD PRESSURE: 76 MMHG | HEART RATE: 76 BPM

## 2024-12-30 DIAGNOSIS — I10 ESSENTIAL HYPERTENSION: ICD-10-CM

## 2024-12-30 DIAGNOSIS — M54.16 LUMBAR RADICULOPATHY: Primary | ICD-10-CM

## 2024-12-30 DIAGNOSIS — G89.29 CHRONIC PAIN: ICD-10-CM

## 2024-12-30 PROCEDURE — 64483 NJX AA&/STRD TFRM EPI L/S 1: CPT | Mod: LT | Performed by: ANESTHESIOLOGY

## 2024-12-30 PROCEDURE — 63600175 PHARM REV CODE 636 W HCPCS: Performed by: ANESTHESIOLOGY

## 2024-12-30 PROCEDURE — 64484 NJX AA&/STRD TFRM EPI L/S EA: CPT | Mod: LT,,, | Performed by: ANESTHESIOLOGY

## 2024-12-30 PROCEDURE — 64484 NJX AA&/STRD TFRM EPI L/S EA: CPT | Mod: LT | Performed by: ANESTHESIOLOGY

## 2024-12-30 PROCEDURE — 25500020 PHARM REV CODE 255: Performed by: ANESTHESIOLOGY

## 2024-12-30 PROCEDURE — 64483 NJX AA&/STRD TFRM EPI L/S 1: CPT | Mod: LT,,, | Performed by: ANESTHESIOLOGY

## 2024-12-30 RX ORDER — SODIUM CHLORIDE 9 MG/ML
INJECTION, SOLUTION INTRAVENOUS CONTINUOUS
Status: DISCONTINUED | OUTPATIENT
Start: 2024-12-30 | End: 2024-12-30 | Stop reason: HOSPADM

## 2024-12-30 RX ORDER — DEXAMETHASONE SODIUM PHOSPHATE 10 MG/ML
INJECTION INTRAMUSCULAR; INTRAVENOUS
Status: DISCONTINUED | OUTPATIENT
Start: 2024-12-30 | End: 2024-12-30 | Stop reason: HOSPADM

## 2024-12-30 RX ORDER — HYDROCHLOROTHIAZIDE 25 MG/1
25 TABLET ORAL DAILY
Qty: 90 TABLET | Refills: 3 | Status: SHIPPED | OUTPATIENT
Start: 2024-12-30

## 2024-12-30 RX ORDER — LIDOCAINE HYDROCHLORIDE 20 MG/ML
INJECTION, SOLUTION INFILTRATION; PERINEURAL
Status: DISCONTINUED | OUTPATIENT
Start: 2024-12-30 | End: 2024-12-30 | Stop reason: HOSPADM

## 2024-12-30 RX ORDER — LIDOCAINE HYDROCHLORIDE 10 MG/ML
INJECTION, SOLUTION EPIDURAL; INFILTRATION; INTRACAUDAL; PERINEURAL
Status: DISCONTINUED | OUTPATIENT
Start: 2024-12-30 | End: 2024-12-30 | Stop reason: HOSPADM

## 2024-12-30 RX ORDER — FENTANYL CITRATE 50 UG/ML
INJECTION, SOLUTION INTRAMUSCULAR; INTRAVENOUS
Status: DISCONTINUED | OUTPATIENT
Start: 2024-12-30 | End: 2024-12-30 | Stop reason: HOSPADM

## 2024-12-30 RX ORDER — HYDROMORPHONE HCL 2 MG/ML
VIAL (ML) INJECTION
Status: DISCONTINUED | OUTPATIENT
Start: 2024-12-30 | End: 2024-12-30 | Stop reason: HOSPADM

## 2024-12-30 RX ORDER — MIDAZOLAM HYDROCHLORIDE 1 MG/ML
INJECTION INTRAMUSCULAR; INTRAVENOUS
Status: DISCONTINUED | OUTPATIENT
Start: 2024-12-30 | End: 2024-12-30 | Stop reason: HOSPADM

## 2024-12-30 NOTE — OP NOTE
Lumbar Transforaminal Epidural Steroid Injection under Fluoroscopic Guidance    The procedure, risks, benefits, and options were discussed with the patient. There are no contraindications to the procedure. The patent expressed understanding and agreed to the procedure. Informed written consent was obtained prior to the start of the procedure and can be found in the patient's chart.    PATIENT NAME: Karis Briceño   MRN: [id number]     DATE OF PROCEDURE: 12/30/2024    PROCEDURE:  Left  L4/5 and L5/S1 Lumbar Transforaminal Epidural Steroid Injection under Fluoroscopic Guidance    PRE-OP DIAGNOSIS: Lumbar radiculopathy [M54.16] Lumbar radiculopathy [M54.16]    POST-OP DIAGNOSIS: Same    PHYSICIAN: Bernard Song MD    ASSISTANTS: None     MEDICATIONS INJECTED: Preservative-free Decadron 10mg with 5cc of Lidocaine 1% MPF     LOCAL ANESTHETIC INJECTED: Xylocaine 2%     SEDATION: Versed 2mg and Dilaudid 1mg an d Fentanyl 50 mcg                                                                                                                                                                            Conscious sedation ordered by M.D. Patient re-evaluation prior to administration of conscious sedation. No changes noted in patient's status from initial evaluation. The patient's vital signs were monitored by RN and patient remained hemodynamically stable throughout the procedure.    Event Time In   Sedation Start 1554   Sedation End 1601       ESTIMATED BLOOD LOSS: None    COMPLICATIONS: None    TECHNIQUE: Time-out was performed to identify the patient and procedure to be performed. With the patient laying in a prone position, the surgical area was prepped and draped in the usual sterile fashion using ChloraPrep and a fenestrated drape.The levels were determined under fluoroscopy guidance. Skin anesthesia was achieved by injecting Lidocaine 2% over the injection sites. The transforaminal spaces were then approached with a 22 gauge,  3.5 inch spinal quinke needle that was introduced under fluoroscopic guidance in the AP and Lateral views. Once the needle tip was in the area of the transforaminal space, and there was no blood, CSF or paraesthesias, contrast dye Omnipaque (300mg/mL) was injected to confirm placement and there was no vascular runoff. Fluoroscopic imaging in the AP and lateral views revealed a clear outline of the spinal nerve with proximal spread of agent through the neural foramen into the epidural space. 3 mL of the medication mixture listed above was injected slowly at each site. Displacement of the radio opaque contrast after injection of the medication confirmed that the medication went into the area of the transforaminal spaces. The needles were removed and bleeding was nil. A sterile dressing was applied. No specimens collected. The patient tolerated the procedure well.     PRE-PROCEDURE PAIN SCORE: 6-10/10    POST-PROCEDURE PAIN SCORE: 0/10    The patient was monitored after the procedure in the recovery area. They were given post-procedure and discharge instructions to follow at home. The patient was discharged in a stable condition.    Estefanía Noyola MD  [facility] PGY2    I reviewed and edited the resident's note. I conducted my own interview and physical examination. I agree with the findings. I was present and supervising all critical portions of the procedure.

## 2024-12-30 NOTE — DISCHARGE INSTRUCTIONS

## 2024-12-30 NOTE — TELEPHONE ENCOUNTER
No care due was identified.  [facility] Embedded Care Due Messages. Reference number: [id number].   12/30/2024 9:36:13 AM CST

## 2024-12-30 NOTE — DISCHARGE SUMMARY
Discharge Note  Short Stay      SUMMARY     Admit Date: 12/30/2024    Attending Physician: Bernard Song MD      Discharge Physician: Estefanía Noyola      Discharge Date: 12/30/2024 3:55 PM    Procedure(s) (LRB):  LUMBAR TRANSFORAMINAL LEFT L4/5 AND L5/S1*ASPIRIN OTC* HOLD FOR 5 DAYS (Left)    Final Diagnosis: Lumbar radiculopathy [M54.16]    Disposition: Home or self care    Patient Instructions:   Current Discharge Medication List        CONTINUE these medications which have NOT CHANGED    Details   amLODIPine (NORVASC) 10 MG tablet Take 1 tablet (10 mg total) by mouth once daily.  Qty: 90 tablet, Refills: 3    Comments: .  Associated Diagnoses: Essential hypertension      anastrozole (ARIMIDEX) 1 mg Tab Take 1 tablet (1 mg total) by mouth once daily.  Qty: 90 tablet, Refills: 3    Associated Diagnoses: Malignant neoplasm of lower-outer quadrant of left breast of female, estrogen receptor positive      aspirin (ECOTRIN) 81 MG EC tablet Take 1 tablet (81 mg total) by mouth once daily.  Qty: 90 tablet, Refills: 3    Associated Diagnoses: Mixed hyperlipidemia      atorvastatin (LIPITOR) 40 MG tablet Take 1 tablet (40 mg total) by mouth once daily.  Qty: 90 tablet, Refills: 3    Associated Diagnoses: Mixed hyperlipidemia      carvediloL (COREG) 6.25 MG tablet Take 1 tablet (6.25 mg total) by mouth 2 (two) times daily with meals.  Qty: 180 tablet, Refills: 3    Comments: .  Associated Diagnoses: Essential hypertension      diazePAM (VALIUM) 2 MG tablet On call to MRI may repeat X 3 do not drive to or from MRI/procedure & for 24 hours  Qty: 3 tablet, Refills: 0    Associated Diagnoses: Lumbar radiculopathy, chronic; Renal mass; Dorsalgia, unspecified      diclofenac sodium (VOLTAREN) 1 % Gel Apply 2 g topically 4 (four) times daily as needed.  Qty: 100 g, Refills: 11    Associated Diagnoses: Chronic left shoulder pain      !! DULoxetine (CYMBALTA) 60 MG capsule Take 1 capsule (60 mg total) by mouth once daily.  Qty: 30  capsule, Refills: 11      !! DULoxetine (CYMBALTA) 60 MG capsule Take 1 capsule (60 mg total) by mouth every evening.  Qty: 90 capsule, Refills: 3    Associated Diagnoses: Chronic right-sided low back pain with right-sided sciatica      ergocalciferol (ERGOCALCIFEROL) 50,000 unit Cap Take 1 capsule (50,000 Units total) by mouth every 7 days.  Qty: 12 capsule, Refills: 3    Associated Diagnoses: Vitamin D deficiency      fish oil-omega-3 fatty acids 300-1,000 mg capsule Take 1 capsule by mouth once daily.      hydroCHLOROthiazide (HYDRODIURIL) 25 MG tablet Take 1 tablet (25 mg total) by mouth once daily.  Qty: 90 tablet, Refills: 3    Comments: .  Associated Diagnoses: Essential hypertension      ketorolac (TORADOL) 10 mg tablet Take 1 tablet (10 mg total) by mouth every 6 (six) hours as needed for Pain.  Qty: 10 tablet, Refills: 0      LIDOcaine (LIDODERM) 5 % Place 1 patch onto the skin once daily. Remove & Discard patch within 12 hours or as directed by MD  Qty: 15 patch, Refills: 0      methocarbamoL (ROBAXIN) 750 MG Tab Take 1 tablet (750 mg total) by mouth 2 (two) times daily as needed (muscle spasm/back pain).  Qty: 30 tablet, Refills: 0    Associated Diagnoses: Lumbar radiculopathy; Myofascial pain      valsartan (DIOVAN) 320 MG tablet Take 1 tablet (320 mg total) by mouth once daily.  Qty: 90 tablet, Refills: 3    Comments: .  Associated Diagnoses: Essential hypertension       !! - Potential duplicate medications found. Please discuss with provider.              Discharge Diagnosis: Lumbar radiculopathy [M54.16]  Condition on Discharge: Stable with no complications to procedure   Diet on Discharge: Same as before.  Activity: as per instruction sheet.  Discharge to: Home with a responsible adult.  Follow up: 2-4 weeks       Please call my office or pager at [phone] if experienced any weakness or loss of sensation, fever > 101.5, pain uncontrolled with oral medications, persistent nausea/vomiting/or  diarrhea, redness or drainage from the incisions, or any other worrisome concerns. If physician on call was not reached or could not communicate with our office for any reason please go to the nearest emergency department

## 2024-12-30 NOTE — H&P
"HPI  Patient presenting for Procedure(s) (LRB):  LUMBAR TRANSFORAMINAL LEFT L4/5 AND L5/S1*ASPIRIN OTC* HOLD FOR 5 DAYS (Left)     Patient on Anti-coagulation No    No health changes since previous encounter    Past Medical History:   Diagnosis Date    Breast cancer 12/20/2021    Left breast IDC    Gallstone pancreatitis 09/2016    Gallstones     Hyperlipidemia     Hypertension      Past Surgical History:   Procedure Laterality Date    BREAST BIOPSY Bilateral 12/20/2021    Right side benign, Left side IDC    HYSTERECTOMY      BROOKS 3, AUB    TRANSFORAMINAL EPIDURAL INJECTION OF STEROID Left 6/18/2024    Procedure: LUMBAR TRANSFORAMINAL LEFT L5/S1 AND S1;  Surgeon: Bernard Song MD;  Location: [facility] PAIN T;  Service: Pain Management;  Laterality: Left;  [phone]  2 WK F/U CHRISTINA     Review of patient's allergies indicates:  No Known Allergies   Current Facility-Administered Medications   Medication    0.9% NaCl infusion       PMHx, PSHx, Allergies, Medications reviewed in epic    ROS negative except pain complaints in HPI    OBJECTIVE:    BP (!) 172/79 (BP Location: Right arm, Patient Position: Sitting)   Pulse 74   Temp 98.4 °F (36.9 °C) (Oral)   Resp 16   Ht 5' 6" (1.676 m)   Wt 82.6 kg (182 lb)   SpO2 96%   BMI 29.38 kg/m²     PHYSICAL EXAMINATION:    GENERAL: Well appearing, in no acute distress, alert and oriented x3.  PSYCH:  Mood and affect appropriate.  SKIN: Skin color, texture, turgor normal, no rashes or lesions which will impact the procedure.  CV: RRR with palpation of the radial artery.  PULM: No evidence of respiratory difficulty, symmetric chest rise. Clear to auscultation.  NEURO: Cranial nerves grossly intact.    Plan:    Proceed with procedure as planned Procedure(s) (LRB):  LUMBAR TRANSFORAMINAL LEFT L4/5 AND L5/S1*ASPIRIN OTC* HOLD FOR 5 DAYS (Left)    Estefanía Noyola MD  [employer] [facility] PGY2    "

## 2024-12-31 NOTE — TELEPHONE ENCOUNTER
Refill Decision Note   Karis Briceño  is requesting a refill authorization.  Brief Assessment and Rationale for Refill:  Approve     Medication Therapy Plan:         Comments:     Note composed:10:57 PM 12/30/2024

## 2025-01-13 ENCOUNTER — TELEPHONE (OUTPATIENT)
Dept: PAIN MEDICINE | Facility: CLINIC | Age: 85
End: 2025-01-13
Payer: MEDICARE

## 2025-01-13 DIAGNOSIS — M54.16 LUMBAR RADICULOPATHY: Primary | ICD-10-CM

## 2025-01-13 DIAGNOSIS — M48.061 NEUROFORAMINAL STENOSIS OF LUMBAR SPINE: ICD-10-CM

## 2025-01-13 NOTE — TELEPHONE ENCOUNTER
Staff spoke with patient and informed her that Dr. Song says she doesn't need to see him before PT. Patient also ask I sent Calli a message informing her that she need PT orders placed. Staff is forwarding this message to calli [place].

## 2025-01-13 NOTE — TELEPHONE ENCOUNTER
----- Message from Andria sent at 1/13/2025 11:05 AM CST -----  Pt Requesting to Reschedule an Appointment     Pt is requesting to Reschedule an appointment that our scheduling dept cannot Reschedule.     Who called: pt  Initial appt date: 1/15/25  When pt wants appt: 1/17/25 at 3pm  Reason: work reasons  Best call back #:  [phone]  Additional notes: I wasn't able to reschedule this appt because it may be for a post op; pt would like to be scheduled to a slot that was available on 1/17/25 3pm; if not any 1/21/25 after 3pm

## 2025-01-13 NOTE — TELEPHONE ENCOUNTER
----- Message from Andria sent at 1/13/2025 11:01 AM CST -----  Pt Requesting Call Back    Who called: pt  Who called for pt:  Best call back #:  [phone]  Add notes: pt said she would like to speak  to Dr. Song; says she wants to know if she have to see Dr. Song before physical therapy; says she can't make that 1/15/25 appt due to work reasons (I sent a reschedule message request) ; says to let him know she's still in pain

## 2025-01-13 NOTE — TELEPHONE ENCOUNTER
Staff attempted to contact patient to inform her that the appointment has been moved to 01.17 @ 2:40pm

## 2025-02-05 ENCOUNTER — TELEPHONE (OUTPATIENT)
Dept: HEMATOLOGY/ONCOLOGY | Facility: CLINIC | Age: 85
End: 2025-02-05
Payer: MEDICARE

## 2025-02-05 NOTE — TELEPHONE ENCOUNTER
Called pt.     Pt wants mammo moved on 3/6 from [facility] to here at [facility].   Provider to see pt in may.    Pt aware.       ----- Message from Veronika sent at 2/5/2025  2:03 PM CST -----  Regarding: Scheduling Request  Contact: [phone]  Scheduling Request           Appt Type:  Ep      Date/Time Preference: First week of march     Caller Name: pt      Contact Preference:   [phone]    Comment: Would like to schedule follow up, nothing available Thank you

## 2025-03-05 ENCOUNTER — OFFICE VISIT (OUTPATIENT)
Dept: PAIN MEDICINE | Facility: CLINIC | Age: 85
End: 2025-03-05
Attending: ANESTHESIOLOGY
Payer: MEDICARE

## 2025-03-05 VITALS
DIASTOLIC BLOOD PRESSURE: 83 MMHG | BODY MASS INDEX: 28.82 KG/M2 | SYSTOLIC BLOOD PRESSURE: 142 MMHG | WEIGHT: 178.56 LBS | TEMPERATURE: 98 F | HEART RATE: 89 BPM

## 2025-03-05 DIAGNOSIS — M48.061 SPINAL STENOSIS OF LUMBAR REGION, UNSPECIFIED WHETHER NEUROGENIC CLAUDICATION PRESENT: ICD-10-CM

## 2025-03-05 DIAGNOSIS — M48.061 NEUROFORAMINAL STENOSIS OF LUMBAR SPINE: ICD-10-CM

## 2025-03-05 DIAGNOSIS — M54.17 LUMBOSACRAL RADICULOPATHY: Primary | ICD-10-CM

## 2025-03-05 DIAGNOSIS — M47.816 LUMBAR SPONDYLOSIS: ICD-10-CM

## 2025-03-05 PROCEDURE — 1159F MED LIST DOCD IN RCRD: CPT | Mod: CPTII,S$GLB,, | Performed by: ANESTHESIOLOGY

## 2025-03-05 PROCEDURE — 3288F FALL RISK ASSESSMENT DOCD: CPT | Mod: CPTII,S$GLB,, | Performed by: ANESTHESIOLOGY

## 2025-03-05 PROCEDURE — 3079F DIAST BP 80-89 MM HG: CPT | Mod: CPTII,S$GLB,, | Performed by: ANESTHESIOLOGY

## 2025-03-05 PROCEDURE — 3077F SYST BP >= 140 MM HG: CPT | Mod: CPTII,S$GLB,, | Performed by: ANESTHESIOLOGY

## 2025-03-05 PROCEDURE — G2211 COMPLEX E/M VISIT ADD ON: HCPCS | Mod: S$GLB,,, | Performed by: ANESTHESIOLOGY

## 2025-03-05 PROCEDURE — 99213 OFFICE O/P EST LOW 20 MIN: CPT | Mod: GC,S$GLB,, | Performed by: ANESTHESIOLOGY

## 2025-03-05 PROCEDURE — 1101F PT FALLS ASSESS-DOCD LE1/YR: CPT | Mod: CPTII,S$GLB,, | Performed by: ANESTHESIOLOGY

## 2025-03-05 PROCEDURE — 99999 PR PBB SHADOW E&M-EST. PATIENT-LVL IV: CPT | Mod: PBBFAC,,, | Performed by: ANESTHESIOLOGY

## 2025-03-05 PROCEDURE — 1125F AMNT PAIN NOTED PAIN PRSNT: CPT | Mod: CPTII,S$GLB,, | Performed by: ANESTHESIOLOGY

## 2025-03-05 PROCEDURE — 1160F RVW MEDS BY RX/DR IN RCRD: CPT | Mod: CPTII,S$GLB,, | Performed by: ANESTHESIOLOGY

## 2025-03-05 NOTE — PROGRESS NOTES
Chronic Pain - Established Patient      Chief Complaint:   Chief Complaint   Patient presents with    Leg Pain     L leg        SUBJECTIVE:    Interval History 3/6/2025:  Patient returns for follow up of back pain. Patient states that most recent PRASAD in December was not helpful; she did not experience any time period of relief. PRASAD in June was also not helpful. Pain is mostly located on the posterior L leg, 7/10 in intensity. Patient states that the lateral surface of the L leg from the knee to the foot feels numb. Patient is currently attending physical therapy and has experienced some degree of relief but worries that she is not doing the correct exercises. Taking BC powder as well as lidocaine patches for pain relief, roughly every other day. States that other pain medications are not helpful.    Interval History 10/14/2024:  The patient returns to clinic today for follow up of back and hip pain. She is s/p left piriformis muscle injection on 8/8/2024. She reports no relief. She reports low back pain that radiates into the posterolateral aspect of her left leg to the foot. She denies any right leg pain. Her pain is worse with walking. She is frustrated with her pain. She is no longer taking Gabapentin. She is taking Cymbalta with limited relief. She ran out of Robaxin. She denies any other health changes. Her pain today is 9/10.     Interval History 7/9/2024:  Karis Briceño returns to clinic s/p Left L5/S1 and S1 TFESI on 6/18/2024. She reports 100% relief of her back pain. Today, she reports numbness to the bottom of her left foot. She also reports left buttock pain that goes to the posterior thigh. She continues Gabapentin 300 mg TID. She reports some drowsiness during the day. She denies weakness or recent falls. She continues Physical Therapy, but does not continue HEP.  The patient denies fever/night sweats, urinary incontinence, bowel incontinence, significant weight changes, significant motor weakness  "or changes, or loss of sensations. Her pain today is 6/10.    Interval HPI 6/6/24:  Karis Briceño returns to clinic for follow up. [facility] ordered open MRI and CT abd and pelvis. Continued with PT and on gabapentin and robaxin for pain. Today reports pain that localizes to her left posterior hamstring with occasional back pain and left buttock pain with associated numbness on the top and bottom of her left foot. She also reports some "locking". When she ADF and APF her foot, she feels pain in her posterior hamstring. She ambulates with a cane and reports some weakness in her LLE. Pain is a 4/10. Made worse by walking. Improves with sitting and rest. She reports offloading her LLE. She reports stopping gabapentin. She reports some sleepiness in the afternoon. She ran out of robaxin. She feels that this helped. She has stopped going to PT due to foot numbness.     Initial HPI 5/22/24:  Karis Briceño presents to the clinic for the evaluation of left buttock and thigh pain. The pain started 3 weeks ago without inciting event and symptoms have been unchanged.The pain is located in the left buttock and posterior thigh area and radiates to the left knee.  The pain is described as sharp and tingling and is rated as 7/10. The pain is rated with a score of  5/10 on the BEST day and a score of 8/10 on the WORST day.  Symptoms interfere with daily activity. The pain is exacerbated by Standing, Touching, Walking, and Extension.  The pain is mitigated by medications. She reports paraesthesia along her lateral LLE and lateral foot. She takes Gabapentin 300mg morning and 600mg QHS and BC powder with some relief.  She went to the ED 3 weeks ago and received Steroid injection, toradol injection, lidocaine patch, and Robaxin. Had previous Right L4/L5 TF PRASAD cancelled in 11/2023 due to anxiousness about procedure.    Patient denies night fever/night sweats, urinary incontinence, bowel incontinence, significant weight loss, significant " motor weakness.    Physical Therapy/Home Exercise: no      Pain Disability Index Review:      3/5/2025     1:13 PM 10/14/2024     9:25 AM 8/8/2024     3:52 PM   Last 3 PDI Scores   Pain Disability Index (PDI) 48 [phone]       Pain Medications:    BC arthritis powder     report:  Reviewed and consistent with medication use as prescribed.    Pain Procedures:   6/18/2024 - Left L5/S1 and S1 TFESI  8/8/2024- Left piriformis muscle injection- no relief    Imaging:   MRI Lumbar Spine 5/28/2024 ([facility]):  Findings:  There is 3 mm anterior offset of L3 relative to L4.     No acute compressions fracture. No pars defects are identified.     Modic type 2 discogenic edema is evident most notably at L5-S1.     T12-L1: the spinal canal and neural foramen are patent. There is no disc bulge or herniation. The disc is hydrated without loss of height.     L1-2: the spinal canal and neural foramen are patent. There is no disc bulge or herniation. The disc is hydrated without loss of height.     L2-3: there are bilateral subarticular disc herniations, larger right, depth of 4.1 mm. There is flattening of the thecal sac contour across the right paracentral zone, combined with facet hypertrophy producing moderate right foraminal stenosis. Ligamentum flavum thickening is evident with mild canal stenosis. The disc is partially desiccated.     L3-4: a concentric, up to 3 mm depth disc bulge is evident exacerbated by anterior offset of L3. Combined with ligamentum flavum thickening, short pedicles and facet hypertrophy, there is moderate spinal canal and moderate right greater than left foraminal stenosis.     L4-5: A broad-based central/paracentral 6.2 mm disc herniation is present. There is severe flattening deformity of the thecal sac, residual AP diameter measuring 3 mm. The AP diameter of the spinal canal measures 8 mm. Associated peripheral spondylosis, bulging disc and facet hypertrophy produces moderate left greater than right  foraminal stenosis.     L5-S1: a broad-based central/asymmetric left paracentral 5.4 mm disc herniation is present with extension into the foraminal zone. There is asymmetric posterior displacement of the descending left S1 nerve root. The AP diameter of the canal is mildly narrowed. There is severe left greater than right foraminal stenosis, bilateral facet hypertrophy with effusions are present.         CT Lumbar spine FINDINGS: 9/2023  Alignment: Grade 1 anterolisthesis of L3 on L4.     Vertebrae: No acute fracture.  Multilevel degenerative endplate changes.  No suspicious lytic or blastic lesions.     Discs: Degenerative disc space narrowing extending from L2-L3 through L5-S1.  Vacuum phenomenon at L2-L3, L4-L5 and L5-S1.  No endplate erosion.     Sacroiliac joints: Symmetric degenerative changes.     Degenerative findings:     T12-L1: No spinal canal stenosis.  No neural foraminal narrowing.     L1-L2: No spinal canal stenosis.  No neural foraminal narrowing     L2-L3: Circumferential disc bulge.  Bilateral facet arthropathy, bilateral ligamentum flavum hypertrophy and prominent posterior epidural fat.  Moderate spinal canal and lateral recess stenosis.  Moderate right and mild left neural foraminal narrowing.     L3-L4: Grade 1 anterolisthesis with uncovering the intervertebral disc.  Circumferential disc bulge.  Bilateral facet arthropathy, bilateral ligamentum flavum hypertrophy and prominent posterior epidural fat.  Severe spinal canal and lateral recess stenosis.  Moderate bilateral neural foraminal narrowing.     L4-L5: Circumferential disc bulge with a superimposed posterior central protrusion.  Bilateral facet arthropathy, bilateral ligamentum flavum hypertrophy and prominent posterior epidural fat.  Severe spinal canal and lateral recess stenosis.  Severe left and moderate to severe right neural foraminal narrowing.     L5-S1: Circumferential disc bulge.  Bilateral facet arthropathy, bilateral  ligamentum flavum hypertrophy and prominent posterior epidural fat.  Moderate spinal canal stenosis.  Severe bilateral neural foraminal narrowing.     Paraspinal muscles & soft tissues: Multiple calcified gallstones.  0.7 cm calcified left renal stone.  0.9 cm left renal cortical lesion that demonstrates attenuation values higher than that of a simple cyst.  Trace atherosclerotic calcification of the abdominal aorta and iliac arteries.  Mild fatty degeneration of the posterior paraspinal musculature.     Impression:  Advanced lumbar spondylosis most severe from L2-L3 through L5-S1 as detailed above.  Cholelithiasis.  Left nonobstructing nephrolithiasis.  0.9 cm left renal cortical lesion that does not meet CT criteria for a simple cyst.  Recommend further characterization with a dedicated renal ultrasound.    LXR FINDINGS: 7/2023  No evidence of an acute fracture or dislocation.  Alignment is maintained.  Intervertebral disc height loss worse at inferior lumbar levels.     Impression:     Degenerative change at inferior lumbar levels without instability.    Past Medical History:   Diagnosis Date    Breast cancer 12/20/2021    Left breast IDC    Gallstone pancreatitis 09/2016    Gallstones     Hyperlipidemia     Hypertension      Past Surgical History:   Procedure Laterality Date    BREAST BIOPSY Bilateral 12/20/2021    Right side benign, Left side IDC    HYSTERECTOMY      BROOKS 3, AUB    TRANSFORAMINAL EPIDURAL INJECTION OF STEROID Left 6/18/2024    Procedure: LUMBAR TRANSFORAMINAL LEFT L5/S1 AND S1;  Surgeon: Bernard Song MD;  Location: [facility];  Service: Pain Management;  Laterality: Left;  [phone]  2 WK F/U CHRISTINA    TRANSFORAMINAL EPIDURAL INJECTION OF STEROID Left 12/30/2024    Procedure: LUMBAR TRANSFORAMINAL LEFT L4/5 AND L5/S1*ASPIRIN OTC* HOLD FOR 5 DAYS;  Surgeon: Bernard Song MD;  Location: [facility]IN [facility];  Service: Pain Management;  Laterality: Left;  [phone]  2 WK F/U ALEX     Social History      Socioeconomic History    Marital status: Single   Tobacco Use    Smoking status: Never    Smokeless tobacco: Never   Substance and Sexual Activity    Alcohol use: Yes     Comment: socially    Drug use: No    Sexual activity: Not Currently     Family History   Problem Relation Name Age of Onset    No Known Problems Mother      Heart attack Father      Coronary artery disease Father      Coronary artery disease Sister      Heart attack Sister      Skin cancer Neg Hx      Colon cancer Neg Hx      Breast cancer Neg Hx      Ovarian cancer Neg Hx         Review of patient's allergies indicates:  No Known Allergies    Current Outpatient Medications   Medication Sig    amLODIPine (NORVASC) 10 MG tablet Take 1 tablet (10 mg total) by mouth once daily.    anastrozole (ARIMIDEX) 1 mg Tab Take 1 tablet (1 mg total) by mouth once daily.    atorvastatin (LIPITOR) 40 MG tablet Take 1 tablet (40 mg total) by mouth once daily.    carvediloL (COREG) 6.25 MG tablet Take 1 tablet (6.25 mg total) by mouth 2 (two) times daily with meals.    diazePAM (VALIUM) 2 MG tablet On call to MRI may repeat X 3 do not drive to or from MRI/procedure & for 24 hours    diclofenac sodium (VOLTAREN) 1 % Gel Apply 2 g topically 4 (four) times daily as needed.    DULoxetine (CYMBALTA) 60 MG capsule Take 1 capsule (60 mg total) by mouth once daily.    DULoxetine (CYMBALTA) 60 MG capsule Take 1 capsule (60 mg total) by mouth every evening.    ergocalciferol (ERGOCALCIFEROL) 50,000 unit Cap Take 1 capsule (50,000 Units total) by mouth every 7 days.    fish oil-omega-3 fatty acids 300-1,000 mg capsule Take 1 capsule by mouth once daily.    hydroCHLOROthiazide (HYDRODIURIL) 25 MG tablet Take 1 tablet (25 mg total) by mouth once daily.    ketorolac (TORADOL) 10 mg tablet Take 1 tablet (10 mg total) by mouth every 6 (six) hours as needed for Pain.    LIDOcaine (LIDODERM) 5 % Place 1 patch onto the skin once daily. Remove & Discard patch within 12 hours or as  directed by MD    methocarbamoL (ROBAXIN) 750 MG Tab Take 1 tablet (750 mg total) by mouth 2 (two) times daily as needed (muscle spasm/back pain).    valsartan (DIOVAN) 320 MG tablet Take 1 tablet (320 mg total) by mouth once daily.    aspirin (ECOTRIN) 81 MG EC tablet Take 1 tablet (81 mg total) by mouth once daily. (Patient not taking: Reported on 10/14/2024)     No current facility-administered medications for this visit.       REVIEW OF SYSTEMS:    GENERAL:  No weight loss, malaise or fevers.  HEENT:  Negative for frequent or significant headaches.  NECK:  Negative for lumps, goiter, pain and significant neck swelling.  RESPIRATORY:  Negative for cough, wheezing or shortness of breath.  CARDIOVASCULAR:  Negative for chest pain, leg swelling or palpitations.  GI:  Negative for abdominal discomfort, blood in stools or black stools or change in bowel habits.  MUSCULOSKELETAL:  See HPI. Left buttock pain and posterior leg pain.  SKIN:  Negative for lesions, rash, and itching.  PSYCH:  Negative for sleep disturbance, mood disorder and recent psychosocial stressors.  HEMATOLOGY/LYMPHOLOGY:  Negative for prolonged bleeding, bruising easily or swollen nodes.  NEURO:   No history of headaches, syncope, paralysis, seizures or tremors. Radiating pain down to left knee. Paraesthesia in lateral leg and foot.  All other reviewed and negative other than HPI.    OBJECTIVE:    BP (!) 142/83 (Patient Position: Sitting)   Pulse 89   Temp 97.6 °F (36.4 °C)   Wt 81 kg (178 lb 9.2 oz)   BMI 28.82 kg/m²     PHYSICAL EXAMINATION:     General appearance: Well appearing, in no acute distress, alert and oriented x3.  Psych:  Mood and affect appropriate.  Skin: Skin color, texture, turgor normal, no rashes or lesions, in both upper and lower body.  Head/face:  Normocephalic, atraumatic.   Cor: Rate regular.   Pulm: Bilateral chest rise. In no apparent respiratory distress.   Back: Straight leg raise is negative for radicular pain  bilaterally. There is mild pain with palpation over left lumbar facet joints. Limited ROM with pain on flexion and extension.   Extremities: No deformities, edema, or skin discoloration. Good capillary refill.  Musculoskeletal: Bilateral lower extremity strength is normal and symmetric.  No atrophy or tone abnormalities are noted.   Neuro:  Diminished sensation along left lower leg and plantar aspect of foot.   Gait: Antalgic-ambulates with single point cane.        ASSESSMENT: 84 y.o. year old female with low back and leg pain, consistent with the followin. Lumbosacral radiculopathy        2. Lumbar spondylosis        3. Neuroforaminal stenosis of lumbar spine                  PLAN:   We discussed with the patient the assessment and recommendations. The following is the plan we agreed on:   - Previous imaging was reviewed and discussed with the patient today. Labs reviewed.     - Start PT with [facility]     - Future consideration: discuss Vertiflex procedure with Dr. Delgado.     - RTC 6 weeks with CHRISTINA    The above plan and management options were discussed at length with patient. Patient is in agreement with the above and verbalized understanding.      Vaughn Garcia MD   2025    Visit today included increased complexity associated with the care of the episodic problem of chronic pain which was addressed and continue to manage the longitudinal care of the patient due to the serious and/or complex managed problem(s) listed above.   I have personally taken the history and examined this patient and agree with the resident's note as stated above.

## 2025-03-06 ENCOUNTER — HOSPITAL ENCOUNTER (OUTPATIENT)
Dept: RADIOLOGY | Facility: HOSPITAL | Age: 85
Discharge: HOME | End: 2025-03-06
Attending: INTERNAL MEDICINE
Payer: MEDICARE

## 2025-03-06 DIAGNOSIS — C50.512 MALIGNANT NEOPLASM OF LOWER-OUTER QUADRANT OF LEFT BREAST OF FEMALE, ESTROGEN RECEPTOR POSITIVE: ICD-10-CM

## 2025-03-06 DIAGNOSIS — Z17.0 MALIGNANT NEOPLASM OF LOWER-OUTER QUADRANT OF LEFT BREAST OF FEMALE, ESTROGEN RECEPTOR POSITIVE: ICD-10-CM

## 2025-03-06 PROCEDURE — 77062 BREAST TOMOSYNTHESIS BI: CPT | Mod: 26,,, | Performed by: RADIOLOGY

## 2025-03-06 PROCEDURE — 77066 DX MAMMO INCL CAD BI: CPT | Mod: TC

## 2025-03-06 PROCEDURE — 77066 DX MAMMO INCL CAD BI: CPT | Mod: 26,,, | Performed by: RADIOLOGY

## 2025-03-10 DIAGNOSIS — E55.9 VITAMIN D DEFICIENCY: ICD-10-CM

## 2025-03-10 RX ORDER — ERGOCALCIFEROL 1.25 MG/1
50000 CAPSULE ORAL
Qty: 12 CAPSULE | Refills: 3 | Status: SHIPPED | OUTPATIENT
Start: 2025-03-10

## 2025-03-10 NOTE — TELEPHONE ENCOUNTER
----- Message from Abbey sent at 3/10/2025 11:31 AM CDT -----  Regarding: refill  Patient requesting a refill on vitamin d she doesn't have the name nor prescription all she knows it's black pill.SAROJ

## 2025-03-10 NOTE — TELEPHONE ENCOUNTER
Care Due:                  Date            Visit Type   Department     Provider  --------------------------------------------------------------------------------                                EP -                              PRIMARY      [facility] INTERNAL  Last Visit: 10-      CARE ([facility])   LUIS Roque                               -                              PRIMARY      [facility] INTERNAL  Next Visit: 04-      CARE ([facility])   LUIS Roque                                                            Last  Test          Frequency    Reason                     Performed    Due Date  --------------------------------------------------------------------------------    Lipid Panel.  12 months..  atorvastatin.............  05- 05-    Vitamin D...  12 months..  ergocalciferol...........  05- 05-    [facility] Embedded Care Due Messages. Reference number: [id number].   3/10/2025 12:57:27 PM CDT

## 2025-03-17 ENCOUNTER — PATIENT MESSAGE (OUTPATIENT)
Dept: INTERNAL MEDICINE | Facility: CLINIC | Age: 85
End: 2025-03-17
Payer: MEDICARE

## 2025-03-17 ENCOUNTER — TELEPHONE (OUTPATIENT)
Dept: HEMATOLOGY/ONCOLOGY | Facility: CLINIC | Age: 85
End: 2025-03-17
Payer: MEDICARE

## 2025-03-18 ENCOUNTER — PATIENT MESSAGE (OUTPATIENT)
Dept: HEMATOLOGY/ONCOLOGY | Facility: CLINIC | Age: 85
End: 2025-03-18
Payer: MEDICARE

## 2025-04-02 ENCOUNTER — TELEPHONE (OUTPATIENT)
Dept: PAIN MEDICINE | Facility: CLINIC | Age: 85
End: 2025-04-02
Payer: MEDICARE

## 2025-04-02 DIAGNOSIS — M54.17 LUMBOSACRAL RADICULOPATHY: Primary | ICD-10-CM

## 2025-04-02 DIAGNOSIS — M47.816 LUMBAR SPONDYLOSIS: ICD-10-CM

## 2025-04-02 NOTE — TELEPHONE ENCOUNTER
----- Message from Aliza sent at 4/2/2025  9:51 AM CDT -----  Regarding: missed call   Who Called: Karis Who Left Message for Patient: Laura Does the patient know what this is regarding? Yes Best Call Back Number: [phone] Additional Information: [facility] fax number is [phone] and office number is [phone]

## 2025-04-02 NOTE — TELEPHONE ENCOUNTER
----- Message from Aliza sent at 4/2/2025  8:46 AM CDT -----  Regarding: missed call   Who Called: Karis Who Left Message for Patient: Laura Does the patient know what this is regarding? Yes [facility] Call Back Number: [phone] Additional Information:

## 2025-04-02 NOTE — TELEPHONE ENCOUNTER
Staff spoke with patient. Patient states she wants new PT orders placed and faxed to the pt below. Staff is forwarding this message to Dr. Song for further assistance.

## 2025-04-02 NOTE — TELEPHONE ENCOUNTER
Staff tried reaching patient from the CRM we received asking for us to call her back to speak about PT,. Staff left vociemail for patient to come back.

## 2025-05-26 ENCOUNTER — TELEPHONE (OUTPATIENT)
Dept: HEMATOLOGY/ONCOLOGY | Facility: CLINIC | Age: 85
End: 2025-05-26
Payer: MEDICARE

## 2025-05-26 NOTE — TELEPHONE ENCOUNTER
Spoke to patient to confirm appointment on 5/27 - rescheduled appointment to next available with Dr Olvera on 6/11 per patient request due to conflicting appointments - patient thanked me assisting

## 2025-05-28 ENCOUNTER — OFFICE VISIT (OUTPATIENT)
Dept: INTERNAL MEDICINE | Facility: CLINIC | Age: 85
End: 2025-05-28
Payer: MEDICARE

## 2025-05-28 ENCOUNTER — HOSPITAL ENCOUNTER (OUTPATIENT)
Dept: RADIOLOGY | Facility: OTHER | Age: 85
Discharge: HOME | End: 2025-05-28
Attending: STUDENT IN AN ORGANIZED HEALTH CARE EDUCATION/TRAINING PROGRAM
Payer: MEDICARE

## 2025-05-28 VITALS
HEART RATE: 76 BPM | BODY MASS INDEX: 28.84 KG/M2 | HEIGHT: 66 IN | WEIGHT: 179.44 LBS | SYSTOLIC BLOOD PRESSURE: 140 MMHG | OXYGEN SATURATION: 95 % | DIASTOLIC BLOOD PRESSURE: 80 MMHG

## 2025-05-28 DIAGNOSIS — R73.03 PREDIABETES: ICD-10-CM

## 2025-05-28 DIAGNOSIS — M62.838 SPASM OF LEFT PIRIFORMIS MUSCLE: ICD-10-CM

## 2025-05-28 DIAGNOSIS — G89.29 CHRONIC MIDLINE LOW BACK PAIN WITH LEFT-SIDED SCIATICA: Primary | ICD-10-CM

## 2025-05-28 DIAGNOSIS — E53.8 VITAMIN B12 DEFICIENCY: ICD-10-CM

## 2025-05-28 DIAGNOSIS — Z00.00 HEALTH MAINTENANCE EXAMINATION: ICD-10-CM

## 2025-05-28 DIAGNOSIS — M54.42 CHRONIC MIDLINE LOW BACK PAIN WITH LEFT-SIDED SCIATICA: Primary | ICD-10-CM

## 2025-05-28 DIAGNOSIS — I10 ESSENTIAL HYPERTENSION: ICD-10-CM

## 2025-05-28 DIAGNOSIS — E78.2 MIXED HYPERLIPIDEMIA: ICD-10-CM

## 2025-05-28 DIAGNOSIS — E55.9 VITAMIN D DEFICIENCY: ICD-10-CM

## 2025-05-28 PROCEDURE — 73502 X-RAY EXAM HIP UNI 2-3 VIEWS: CPT | Mod: TC,FY,LT

## 2025-05-28 PROCEDURE — 99999 PR PBB SHADOW E&M-EST. PATIENT-LVL V: CPT | Mod: PBBFAC,,, | Performed by: STUDENT IN AN ORGANIZED HEALTH CARE EDUCATION/TRAINING PROGRAM

## 2025-05-28 PROCEDURE — 1126F AMNT PAIN NOTED NONE PRSNT: CPT | Mod: CPTII,S$GLB,, | Performed by: STUDENT IN AN ORGANIZED HEALTH CARE EDUCATION/TRAINING PROGRAM

## 2025-05-28 PROCEDURE — 96372 THER/PROPH/DIAG INJ SC/IM: CPT | Mod: S$GLB,,, | Performed by: STUDENT IN AN ORGANIZED HEALTH CARE EDUCATION/TRAINING PROGRAM

## 2025-05-28 PROCEDURE — 99215 OFFICE O/P EST HI 40 MIN: CPT | Mod: 25,S$GLB,, | Performed by: STUDENT IN AN ORGANIZED HEALTH CARE EDUCATION/TRAINING PROGRAM

## 2025-05-28 PROCEDURE — 3077F SYST BP >= 140 MM HG: CPT | Mod: CPTII,S$GLB,, | Performed by: STUDENT IN AN ORGANIZED HEALTH CARE EDUCATION/TRAINING PROGRAM

## 2025-05-28 PROCEDURE — 1101F PT FALLS ASSESS-DOCD LE1/YR: CPT | Mod: CPTII,S$GLB,, | Performed by: STUDENT IN AN ORGANIZED HEALTH CARE EDUCATION/TRAINING PROGRAM

## 2025-05-28 PROCEDURE — 3079F DIAST BP 80-89 MM HG: CPT | Mod: CPTII,S$GLB,, | Performed by: STUDENT IN AN ORGANIZED HEALTH CARE EDUCATION/TRAINING PROGRAM

## 2025-05-28 PROCEDURE — 3288F FALL RISK ASSESSMENT DOCD: CPT | Mod: CPTII,S$GLB,, | Performed by: STUDENT IN AN ORGANIZED HEALTH CARE EDUCATION/TRAINING PROGRAM

## 2025-05-28 PROCEDURE — 73502 X-RAY EXAM HIP UNI 2-3 VIEWS: CPT | Mod: 26,LT,, | Performed by: RADIOLOGY

## 2025-05-28 RX ORDER — CELECOXIB 100 MG/1
100 CAPSULE ORAL DAILY PRN
Qty: 30 CAPSULE | Refills: 1 | Status: SHIPPED | OUTPATIENT
Start: 2025-05-28

## 2025-05-28 RX ORDER — PREGABALIN 75 MG/1
CAPSULE ORAL
Qty: 21 CAPSULE | Refills: 0 | Status: CANCELLED | OUTPATIENT
Start: 2025-05-28 | End: 2025-06-11

## 2025-05-28 RX ORDER — PREGABALIN 25 MG/1
CAPSULE ORAL
Qty: 113 CAPSULE | Refills: 0 | Status: SHIPPED | OUTPATIENT
Start: 2025-05-28 | End: 2025-06-19

## 2025-05-28 RX ORDER — METHOCARBAMOL 500 MG/1
1000 TABLET, FILM COATED ORAL 3 TIMES DAILY PRN
Qty: 45 TABLET | Refills: 1 | Status: SHIPPED | OUTPATIENT
Start: 2025-05-28

## 2025-05-28 RX ORDER — KETOROLAC TROMETHAMINE 30 MG/ML
15 INJECTION, SOLUTION INTRAMUSCULAR; INTRAVENOUS
Status: COMPLETED | OUTPATIENT
Start: 2025-05-28 | End: 2025-05-28

## 2025-05-28 RX ORDER — ERGOCALCIFEROL 1.25 MG/1
50000 CAPSULE ORAL
Qty: 12 CAPSULE | Refills: 3 | Status: SHIPPED | OUTPATIENT
Start: 2025-05-28

## 2025-05-28 RX ADMIN — KETOROLAC TROMETHAMINE 15 MG: 30 INJECTION, SOLUTION INTRAMUSCULAR; INTRAVENOUS at 03:05

## 2025-05-28 NOTE — PATIENT INSTRUCTIONS
Stretch at least 2 times per day for the next 2 weeks.  Sit with heating pad on painful areas for 20 minutes prior to stretching.  May also take muscle relaxer (methocarbamol) prior to applying heat if able.   Avoid stretches that cause overt pain.   Avoid sedating substances such as alcohol or antihistamines with muscles relaxers.   Avoid activities that could be dangerous, such as driving, when taking muscle relaxer.  Start Lyrica (pregabalin) 25 mg nightly for 1 week. After 1 week, increase to Lyrica 25 mg twice daily.  Can take celecoxib (Celebrex) once daily as needed for pain.  Avoid other NSAIDs such as ibuprofen (Motrin), naproxen (Aleve), and BC powders while taking the Celebrex.  Can take Tylenol (acetaminophen) 650 mg every 6 hours as needed for breakthrough pain.

## 2025-05-28 NOTE — PROGRESS NOTES
Subjective:       Patient ID: Karis Briceño is a 84 y.o. female.    Chief Complaint: Chronic midline low back pain with left-sided sciatica [M54.42, G89.29]    Patient is established with me, here today for the following:    History of Present Illness      FATIGUE:  She reports experiencing significant fatigue, noting a substantial decrease from her previous energy levels when she was highly active at school.    MUSCULOSKELETAL PAIN:  She reports left hip and leg pain with associated numbness in toes and foot. The pain significantly affects her mobility and daily activities, including inability to wear certain shoes. She has been taking BC Powder for pain relief despite medical advice against it. She attends physical therapy at [facility], where she rides a stationary bike and performs pushing exercises.    HYPERTENSION:  She reports blood pressure has not been 120/80 since her twenties and expresses concern about maintaining blood pressure below 140. Today, her blood pressure exceeded 140 despite being on multiple antihypertensive medications.    CURRENT MEDICATIONS:  She takes amlodipine 10 mg, fish oil, vitamin D, and uses Vaseline.      ROS:  General: +fatigue, +decreased energy levels, +loss of energy  Musculoskeletal: +limb pain, +limited movement  Neurological: +numbness        Health maintenance -   Never previously had colorectal cancer screening, declines screening.  Denies family history of colorectal cancer.  Denies family history of breast cancer.  Denies family history of ovarian cancer.  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   Completed DEXA in DEC2022.  Showed normal bone density.  Family history of cardiac disease.  UTD on COVID primary/bivalent, PPSV23, PCV13 vaccinations.  Due for RSV, shingles, COVID, Tdap vaccinations.  Never smoker.  Denies drug use.  Completed HIV and hepatitis C screening.           HTN -   Currently prescribed amlodipine, carvedilol, valsartan, HCTZ.   Lab Results   Component  Value Date    MICALBCREAT 12.8 05/22/2024     BP Readings from Last 5 Encounters:   03/05/25 (!) 142/83   12/30/24 (!) 155/76   11/11/24 138/70   11/05/24 (!) 154/86   10/17/24 130/70      History of breast cancer -   Noted left breast lump in OCT2021  Underwent bilateral breast biopsy DEC2021, pathology with right breast infiltrating ductal carcinoma and left breast fibroadenoma  Elected not to undergo lumpectomy   Prescribed anastrozole, started medication in JAN2023  Following with Dr. Olvera for oncology     Following with Dr. Song routinely for pain management.     HLD -   Endorses taking atorvastatin as directed  Lab Results   Component Value Date    CHOL 220 (H) 05/22/2024     Lab Results   Component Value Date    TRIG 119 05/22/2024     Lab Results   Component Value Date    LDLCALC 130.2 05/22/2024     Lab Results   Component Value Date    HDL 66 05/22/2024     Prediabetes -   Lab Results   Component Value Date    HGBA1C 6.1 (H) 10/14/2024    HGBA1C 6.4 (H) 05/22/2024    HGBA1C 6.2 (H) 11/08/2022     Vitamin D deficiency -  Lab Results   Component Value Date    WDHBSKOI48DO 28 (L) 05/22/2024      Echo MAY2024 with EF 60-65%, grade I DD, moderate aortic stenosis  Following with Dr. Owens for cardiology.        Current Outpatient Medications   Medication Instructions    amLODIPine (NORVASC) 10 mg, Oral, Daily    anastrozole (ARIMIDEX) 1 mg, Oral, Daily    aspirin (ECOTRIN) 81 mg, Oral, Daily    atorvastatin (LIPITOR) 40 mg, Oral, Daily    carvediloL (COREG) 6.25 mg, Oral, 2 times daily with meals    diazePAM (VALIUM) 2 MG tablet On call to MRI may repeat X 3 do not drive to or from MRI/procedure & for 24 hours    diclofenac sodium (VOLTAREN) 2 g, Topical (Top), 4 times daily PRN    DULoxetine (CYMBALTA) 60 mg, Oral, Daily    DULoxetine (CYMBALTA) 60 mg, Oral, Nightly    fish oil-omega-3 fatty acids 300-1,000 mg capsule 1 capsule, Daily    hydroCHLOROthiazide (HYDRODIURIL) 25 mg, Oral, Daily    ketorolac  "(TORADOL) 10 mg, Oral, Every 6 hours PRN    LIDOcaine (LIDODERM) 5 % 1 patch, Transdermal, Daily, Remove & Discard patch within 12 hours or as directed by MD    methocarbamoL (ROBAXIN) 750 mg, Oral, 2 times daily PRN    valsartan (DIOVAN) 320 mg, Oral, Daily    VITAMIN D2 50,000 Units, Oral, Every 7 days     Objective:      Vitals:    05/28/25 1416   BP: (!) 140/80   Pulse: 76   SpO2: 95%   Weight: 81.4 kg (179 lb 7.3 oz)   Height: 5' 6" (1.676 m)   PainSc: 0-No pain     Body mass index is 28.96 kg/m².    Physical Exam  Vitals reviewed.   Constitutional:       General: She is not in acute distress.     Appearance: She is not ill-appearing or diaphoretic.   Cardiovascular:      Rate and Rhythm: Normal rate and regular rhythm.      Heart sounds: Normal heart sounds. No murmur heard.     No friction rub. No gallop.   Pulmonary:      Effort: Pulmonary effort is normal. No respiratory distress.      Breath sounds: Normal breath sounds. No stridor. No wheezing, rhonchi or rales.   Musculoskeletal:      Right hip: No deformity, tenderness or bony tenderness. Decreased range of motion.      Left hip: Tenderness and bony tenderness present. No deformity. Decreased range of motion.      Comments:     EARLENE positive bilaterally  Straight leg raise positive left, negative right  TTP upper gluteal region bilaterally  Non-TTP SI joint bilaterally  TTP trochanteric bursa left, non-TTP right     Skin:     General: Skin is warm and dry.      Comments: Color WNL   Neurological:      Mental Status: She is alert. Mental status is at baseline.   Psychiatric:         Mood and Affect: Mood normal.         Behavior: Behavior normal.         Assessment:       1. Chronic midline low back pain with left-sided sciatica    2. Vitamin D deficiency    3. Essential hypertension    4. Health maintenance examination    5. Vitamin B12 deficiency    6. Prediabetes    7. Mixed hyperlipidemia    8. Spasm of left piriformis muscle        Plan:   Chronic " midline low back pain with left-sided sciatica  -     Ambulatory referral/consult to Spine Care; Future; Expected date: 06/04/2025  -     Ambulatory Referral/Consult to Physical Therapy  -     methocarbamoL (ROBAXIN) 500 MG Tab; Take 2 tablets (1,000 mg total) by mouth 3 (three) times daily as needed (take before stretching).  Dispense: 45 tablet; Refill: 1  -     celecoxib (CELEBREX) 100 MG capsule; Take 1 capsule (100 mg total) by mouth daily as needed for Pain.  Dispense: 30 capsule; Refill: 1  -     Discontinue: pregabalin (LYRICA) 25 MG capsule; Take 1 capsule (25 mg total) by mouth every evening for 7 days, THEN 1 capsule (25 mg total) 2 (two) times daily.  Dispense: 113 capsule; Refill: 0    Vitamin D deficiency  -     ergocalciferol (ERGOCALCIFEROL) 50,000 unit Cap; Take 1 capsule (50,000 Units total) by mouth every 7 days.  Dispense: 12 capsule; Refill: 3  -     Vitamin D; Future; Expected date: 05/28/2025    Essential hypertension  -     Microalbumin/Creatinine Ratio, Urine; Future; Expected date: 05/28/2025  -     CBC Auto Differential; Future; Expected date: 05/28/2025  -     TSH; Future; Expected date: 05/28/2025  -     Comprehensive Metabolic Panel; Future; Expected date: 05/28/2025  -     Ambulatory referral/consult to Sleep Disorders; Future; Expected date: 06/04/2025    Health maintenance examination  -     Microalbumin/Creatinine Ratio, Urine; Future; Expected date: 05/28/2025  -     Vitamin B12; Future; Expected date: 05/28/2025  -     Vitamin D; Future; Expected date: 05/28/2025  -     CBC Auto Differential; Future; Expected date: 05/28/2025  -     Hemoglobin A1C; Future; Expected date: 05/28/2025  -     Lipid Panel; Future; Expected date: 05/28/2025  -     TSH; Future; Expected date: 05/28/2025  -     Comprehensive Metabolic Panel; Future; Expected date: 05/28/2025    Vitamin B12 deficiency  -     Vitamin B12; Future; Expected date: 05/28/2025    Prediabetes  -     Hemoglobin A1C; Future;  Expected date: 05/28/2025    Mixed hyperlipidemia  -     Lipid Panel; Future; Expected date: 05/28/2025    Spasm of left piriformis muscle  -     Ambulatory Referral/Consult to Physical Therapy  -     methocarbamoL (ROBAXIN) 500 MG Tab; Take 2 tablets (1,000 mg total) by mouth 3 (three) times daily as needed (take before stretching).  Dispense: 45 tablet; Refill: 1  -     X-Ray Hip 2 or 3 views Left with Pelvis when performed; Future; Expected date: 05/28/2025  -     celecoxib (CELEBREX) 100 MG capsule; Take 1 capsule (100 mg total) by mouth daily as needed for Pain.  Dispense: 30 capsule; Refill: 1  -     ketorolac injection 15 mg        Assessment & Plan    PLAN SUMMARY:  - Order labs: thyroid function, B12 levels, complete blood count  - Order x-ray of left hip  - Administer Toradol injection for immediate pain relief  - Prescribe Lyrica for neuropathic pain management  - Prescribe methocarbamol 1000 mg for muscle relaxation  - Prescribe celecoxib for pain management as needed  - Discontinue BC powder  - Recommend heat therapy and stretching exercises  - Refer to back and spine specialist for non-surgical evaluation  - Recommend physical therapy for hip flexibility and pain reduction  - Recommend sleep study to evaluate potential sleep apnea  - Continue amlodipine 10 mg and hydrochlorothiazide for blood pressure management  - Follow up in early July    ## ESSENTIAL HYPERTENSION:  - Karis's blood pressure remains under 140, meeting target goal despite not being 120/80 since twenties.  - Weight is stable at 178-180 lbs.  - Continuing amlodipine 10 mg and hydrochlorothiazide for blood pressure management.  - Discussed potential sleep apnea as a contributing factor to both fatigue and blood pressure issues; recommended sleep study.    ## CHRONIC PAIN:  - Assessed chronic pain primarily affecting left hip and lower back, which is impacting mobility and daily activities.  - Physical exam revealed tight muscles in  lower back and decreased range of motion in hip.  - Prescribed Lyrica twice daily for neuropathic pain, starting with lower dose and increasing as needed (instructions on prescription bottle).  - Prescribed celecoxib daily with food as needed for pain as a safer alternative to BC powder, which has been discontinued due to risks of stomach ulcers and bleeding.  - Administered Toradol injection in office for immediate pain relief.  - Increased methocarbamol to 1000 mg (2 500 mg tablets) to be taken before stretching for muscle relaxation.  - Recommend combination of heat therapy, muscle relaxants, and stretching to address muscle tightness.  - Karis instructed to contact office if experiencing changes in bowel movements or diarrhea while taking new medications.    ## LEFT-SIDED SCIATICA:  - Karis reports numbness in toes and muscle movement in buttocks, indicating nerve compression.  - Physical exam findings suggest sciatica, with piriformis syndrome considered as potential cause of sciatic nerve compression.  - Explained anatomy of sciatic nerve and its path through hip muscles.  - Prescribed Lyrica for neuropathic pain management and methocarbamol for muscle relaxation to alleviate nerve compression symptoms.  - Referred to back and spine specialist for non-surgical evaluation of sciatica and nerve compression.    ## LEFT HIP STIFFNESS:  - Evaluated significant limitation in range of motion of left hip affecting daily activities.  - Ordered x-ray of left hip to assess for bony abnormalities.  - Prescribed methocarbamol for muscle relaxation to improve hip mobility.  - Recommend physical therapy focusing on range of motion exercises and stretching for hip flexibility and pain reduction.  - Instructed patient to apply heating pad to left hip area for 20-30 minutes before stretching to loosen muscles.  - Karis should perform hip stretches 2-3 times daily for the next 2 weeks, then at least daily long-term.    ## LOW  BACK PAIN:  - Karis reports pain in lower back affecting mobility and daily activities.  - Physical exam revealed tight muscles in lower back as the pain source.  - Prescribed Lyrica for pain management and methocarbamol for muscle relaxation.  - Referred to spine specialist for evaluation of low back pain and potential nerve compression.    ## FATIGUE:  - Karis reports feeling tired and lacking energy, though notes recent improvement in energy levels and mood.  - Ordered labs to check thyroid function, B12 levels, and complete blood count to rule out anemia and other causes of fatigue.    ## FOLLOW-UP:  - Follow up in early July, before returning to school.         46 minutes were spent in chart review, documentation and review of results, and evaluation, treatment, and counseling of patient on the same day of service.    Oly Roque MD  5/28/2025

## 2025-05-28 NOTE — PROGRESS NOTES
"Per order, patient to receive 0.5mL of Toradol (ketorolac tromethamine) 30mg/mL. The area of injection was palpated using the medial fold and the iliac crest as anatomical landmarks. Patient was advised to relax the muscle. The area was cleaned with alcohol and allowed to dry. Using a 25g 1.5" needle, 0.5mL of Toradol (ketorolac tromethamine) 30mg/mL was placed intramuscularly into the left upper outer gluteal quadrant. Patient experienced no complications and was discharged in stable condition. Toradol Lot: [id number] Exp: 11/2025     "

## 2025-06-09 ENCOUNTER — PATIENT MESSAGE (OUTPATIENT)
Dept: ADMINISTRATIVE | Facility: HOSPITAL | Age: 85
End: 2025-06-09
Payer: MEDICARE

## 2025-06-10 ENCOUNTER — TELEPHONE (OUTPATIENT)
Dept: INTERNAL MEDICINE | Facility: CLINIC | Age: 85
End: 2025-06-10

## 2025-06-10 DIAGNOSIS — I10 ESSENTIAL HYPERTENSION: Primary | ICD-10-CM

## 2025-06-10 DIAGNOSIS — I35.0 NONRHEUMATIC AORTIC VALVE STENOSIS: ICD-10-CM

## 2025-06-10 NOTE — TELEPHONE ENCOUNTER
----- Message from Med Assistant Antonio sent at 6/10/2025 10:11 AM CDT -----  Name of Who is Calling:RAYA MCPHERSON [[id number]]  What is the request in detail: Pt is requesting  a call back to see when she needs to see the cardiologist. Please assist.  Can the clinic reply by [facility]: No  [facility] [phone]

## 2025-06-12 ENCOUNTER — TELEPHONE (OUTPATIENT)
Dept: HEMATOLOGY/ONCOLOGY | Facility: CLINIC | Age: 85
End: 2025-06-12
Payer: MEDICARE

## 2025-06-12 NOTE — TELEPHONE ENCOUNTER
Called pt due to missed appt on 6/12 - pt stated she was called into work and needed to r/s - appt r/s to 7/25 at 4:20 PM and added her to the wait list in the event an earlier appt becomes available - pt requested to only see Dr Olvera

## 2025-06-19 ENCOUNTER — OFFICE VISIT (OUTPATIENT)
Dept: PAIN MEDICINE | Facility: CLINIC | Age: 85
End: 2025-06-19
Payer: MEDICARE

## 2025-06-19 ENCOUNTER — PATIENT MESSAGE (OUTPATIENT)
Dept: PAIN MEDICINE | Facility: CLINIC | Age: 85
End: 2025-06-19

## 2025-06-19 VITALS
WEIGHT: 182.13 LBS | DIASTOLIC BLOOD PRESSURE: 80 MMHG | HEART RATE: 86 BPM | BODY MASS INDEX: 29.27 KG/M2 | SYSTOLIC BLOOD PRESSURE: 154 MMHG | HEIGHT: 66 IN | OXYGEN SATURATION: 100 % | RESPIRATION RATE: 18 BRPM | TEMPERATURE: 99 F

## 2025-06-19 DIAGNOSIS — M48.062 LUMBAR STENOSIS WITH NEUROGENIC CLAUDICATION: Primary | ICD-10-CM

## 2025-06-19 DIAGNOSIS — M54.42 CHRONIC MIDLINE LOW BACK PAIN WITH LEFT-SIDED SCIATICA: ICD-10-CM

## 2025-06-19 DIAGNOSIS — G89.29 CHRONIC MIDLINE LOW BACK PAIN WITH LEFT-SIDED SCIATICA: ICD-10-CM

## 2025-06-19 PROCEDURE — 99999 PR PBB SHADOW E&M-EST. PATIENT-LVL IV: CPT | Mod: PBBFAC,,, | Performed by: ANESTHESIOLOGY

## 2025-06-19 PROCEDURE — 1160F RVW MEDS BY RX/DR IN RCRD: CPT | Mod: CPTII,S$GLB,, | Performed by: ANESTHESIOLOGY

## 2025-06-19 PROCEDURE — 3077F SYST BP >= 140 MM HG: CPT | Mod: CPTII,S$GLB,, | Performed by: ANESTHESIOLOGY

## 2025-06-19 PROCEDURE — 1159F MED LIST DOCD IN RCRD: CPT | Mod: CPTII,S$GLB,, | Performed by: ANESTHESIOLOGY

## 2025-06-19 PROCEDURE — 3288F FALL RISK ASSESSMENT DOCD: CPT | Mod: CPTII,S$GLB,, | Performed by: ANESTHESIOLOGY

## 2025-06-19 PROCEDURE — 1101F PT FALLS ASSESS-DOCD LE1/YR: CPT | Mod: CPTII,S$GLB,, | Performed by: ANESTHESIOLOGY

## 2025-06-19 PROCEDURE — 3079F DIAST BP 80-89 MM HG: CPT | Mod: CPTII,S$GLB,, | Performed by: ANESTHESIOLOGY

## 2025-06-19 PROCEDURE — 99214 OFFICE O/P EST MOD 30 MIN: CPT | Mod: GC,S$GLB,, | Performed by: ANESTHESIOLOGY

## 2025-06-19 PROCEDURE — 1125F AMNT PAIN NOTED PAIN PRSNT: CPT | Mod: CPTII,S$GLB,, | Performed by: ANESTHESIOLOGY

## 2025-06-19 PROCEDURE — G2211 COMPLEX E/M VISIT ADD ON: HCPCS | Mod: S$GLB,,, | Performed by: ANESTHESIOLOGY

## 2025-06-19 RX ORDER — PREGABALIN 50 MG/1
50 CAPSULE ORAL 2 TIMES DAILY
Qty: 60 CAPSULE | Refills: 0 | Status: SHIPPED | OUTPATIENT
Start: 2025-06-19 | End: 2025-07-19

## 2025-06-19 NOTE — PROGRESS NOTES
Chronic Pain - Established Patient      Chief Complaint:   Chief Complaint   Patient presents with    Back Pain     Pt states she does not want any surgeries or procedure because they don't help.    Foot Pain     Pt states she has foot numbness on the left foot.    Hip Pain     Pain in hip spreads to feet        SUBJECTIVE:    Interval History 06/19/2025  Patient was referred to us by PCP for low back pain with left leg radiation.  However she has been seen by Dr. Song in the past for similar complaints.  She reports no pain in her groin area.  Her main pain is on the left leg with numbness and tingling in the lateral aspect of her leg toes.  She reports recently pain was mainly on the left but now she started to have tingling sensation on the right lower leg as well.  She was last prescribed Lyrica but was not taking persistently and now she is not sure if she still has on medication.  She wished for us to prescribe Lyrica.  She is currently doing PT but reports that it is not helping as well.  Denied any weakness, GI/ incontinence.      Interval History 3/6/2025:  Patient returns for follow up of back pain. Patient states that most recent PRASAD in December was not helpful; she did not experience any time period of relief. PRASAD in June was also not helpful. Pain is mostly located on the posterior L leg, 7/10 in intensity. Patient states that the lateral surface of the L leg from the knee to the foot feels numb. Patient is currently attending physical therapy and has experienced some degree of relief but worries that she is not doing the correct exercises. Taking BC powder as well as lidocaine patches for pain relief, roughly every other day. States that other pain medications are not helpful.    Interval History 10/14/2024:  The patient returns to clinic today for follow up of back and hip pain. She is s/p left piriformis muscle injection on 8/8/2024. She reports no relief. She reports low back pain that radiates  "into the posterolateral aspect of her left leg to the foot. She denies any right leg pain. Her pain is worse with walking. She is frustrated with her pain. She is no longer taking Gabapentin. She is taking Cymbalta with limited relief. She ran out of Robaxin. She denies any other health changes. Her pain today is 9/10.     Interval History 7/9/2024:  Karis Briceño returns to clinic s/p Left L5/S1 and S1 TFESI on 6/18/2024. She reports 100% relief of her back pain. Today, she reports numbness to the bottom of her left foot. She also reports left buttock pain that goes to the posterior thigh. She continues Gabapentin 300 mg TID. She reports some drowsiness during the day. She denies weakness or recent falls. She continues Physical Therapy, but does not continue HEP.  The patient denies fever/night sweats, urinary incontinence, bowel incontinence, significant weight changes, significant motor weakness or changes, or loss of sensations. Her pain today is 6/10.    Interval HPI 6/6/24:  Karis Briceño returns to clinic for follow up. [facility] ordered open MRI and CT abd and pelvis. Continued with PT and on gabapentin and robaxin for pain. Today reports pain that localizes to her left posterior hamstring with occasional back pain and left buttock pain with associated numbness on the top and bottom of her left foot. She also reports some "locking". When she ADF and APF her foot, she feels pain in her posterior hamstring. She ambulates with a cane and reports some weakness in her LLE. Pain is a 4/10. Made worse by walking. Improves with sitting and rest. She reports offloading her LLE. She reports stopping gabapentin. She reports some sleepiness in the afternoon. She ran out of robaxin. She feels that this helped. She has stopped going to PT due to foot numbness.     Initial HPI 5/22/24:  Karis Briceño presents to the clinic for the evaluation of left buttock and thigh pain. The pain started 3 weeks ago without inciting event " and symptoms have been unchanged.The pain is located in the left buttock and posterior thigh area and radiates to the left knee.  The pain is described as sharp and tingling and is rated as 7/10. The pain is rated with a score of  5/10 on the BEST day and a score of 8/10 on the WORST day.  Symptoms interfere with daily activity. The pain is exacerbated by Standing, Touching, Walking, and Extension.  The pain is mitigated by medications. She reports paraesthesia along her lateral LLE and lateral foot. She takes Gabapentin 300mg morning and 600mg QHS and BC powder with some relief.  She went to the ED 3 weeks ago and received Steroid injection, toradol injection, lidocaine patch, and Robaxin. Had previous Right L4/L5 TF PRASAD cancelled in 11/2023 due to anxiousness about procedure.    Patient denies night fever/night sweats, urinary incontinence, bowel incontinence, significant weight loss, significant motor weakness.    Physical Therapy/Home Exercise: currently in PT    Pain Disability Index Review:      6/19/2025     9:35 AM 3/5/2025     1:13 PM 10/14/2024     9:25 AM   Last 3 PDI Scores   Pain Disability Index (PDI) [phone]       Pain Medications:    BC arthritis powder     report:  Reviewed and consistent with medication use as prescribed.    Pain Procedures:   6/18/2024 - Left L5/S1 and S1 TFESI  8/8/2024- Left piriformis muscle injection- no relief    Imaging:   MRI Lumbar Spine 5/28/2024 ([facility]):  Findings:  There is 3 mm anterior offset of L3 relative to L4.     No acute compressions fracture. No pars defects are identified.     Modic type 2 discogenic edema is evident most notably at L5-S1.     T12-L1: the spinal canal and neural foramen are patent. There is no disc bulge or herniation. The disc is hydrated without loss of height.     L1-2: the spinal canal and neural foramen are patent. There is no disc bulge or herniation. The disc is hydrated without loss of height.     L2-3: there are bilateral  subarticular disc herniations, larger right, depth of 4.1 mm. There is flattening of the thecal sac contour across the right paracentral zone, combined with facet hypertrophy producing moderate right foraminal stenosis. Ligamentum flavum thickening is evident with mild canal stenosis. The disc is partially desiccated.     L3-4: a concentric, up to 3 mm depth disc bulge is evident exacerbated by anterior offset of L3. Combined with ligamentum flavum thickening, short pedicles and facet hypertrophy, there is moderate spinal canal and moderate right greater than left foraminal stenosis.     L4-5: A broad-based central/paracentral 6.2 mm disc herniation is present. There is severe flattening deformity of the thecal sac, residual AP diameter measuring 3 mm. The AP diameter of the spinal canal measures 8 mm. Associated peripheral spondylosis, bulging disc and facet hypertrophy produces moderate left greater than right foraminal stenosis.     L5-S1: a broad-based central/asymmetric left paracentral 5.4 mm disc herniation is present with extension into the foraminal zone. There is asymmetric posterior displacement of the descending left S1 nerve root. The AP diameter of the canal is mildly narrowed. There is severe left greater than right foraminal stenosis, bilateral facet hypertrophy with effusions are present.         CT Lumbar spine FINDINGS: 9/2023  Alignment: Grade 1 anterolisthesis of L3 on L4.     Vertebrae: No acute fracture.  Multilevel degenerative endplate changes.  No suspicious lytic or blastic lesions.     Discs: Degenerative disc space narrowing extending from L2-L3 through L5-S1.  Vacuum phenomenon at L2-L3, L4-L5 and L5-S1.  No endplate erosion.     Sacroiliac joints: Symmetric degenerative changes.     Degenerative findings:     T12-L1: No spinal canal stenosis.  No neural foraminal narrowing.     L1-L2: No spinal canal stenosis.  No neural foraminal narrowing     L2-L3: Circumferential disc bulge.   Bilateral facet arthropathy, bilateral ligamentum flavum hypertrophy and prominent posterior epidural fat.  Moderate spinal canal and lateral recess stenosis.  Moderate right and mild left neural foraminal narrowing.     L3-L4: Grade 1 anterolisthesis with uncovering the intervertebral disc.  Circumferential disc bulge.  Bilateral facet arthropathy, bilateral ligamentum flavum hypertrophy and prominent posterior epidural fat.  Severe spinal canal and lateral recess stenosis.  Moderate bilateral neural foraminal narrowing.     L4-L5: Circumferential disc bulge with a superimposed posterior central protrusion.  Bilateral facet arthropathy, bilateral ligamentum flavum hypertrophy and prominent posterior epidural fat.  Severe spinal canal and lateral recess stenosis.  Severe left and moderate to severe right neural foraminal narrowing.     L5-S1: Circumferential disc bulge.  Bilateral facet arthropathy, bilateral ligamentum flavum hypertrophy and prominent posterior epidural fat.  Moderate spinal canal stenosis.  Severe bilateral neural foraminal narrowing.     Paraspinal muscles & soft tissues: Multiple calcified gallstones.  0.7 cm calcified left renal stone.  0.9 cm left renal cortical lesion that demonstrates attenuation values higher than that of a simple cyst.  Trace atherosclerotic calcification of the abdominal aorta and iliac arteries.  Mild fatty degeneration of the posterior paraspinal musculature.     Impression:  Advanced lumbar spondylosis most severe from L2-L3 through L5-S1 as detailed above.  Cholelithiasis.  Left nonobstructing nephrolithiasis.  0.9 cm left renal cortical lesion that does not meet CT criteria for a simple cyst.  Recommend further characterization with a dedicated renal ultrasound.    LXR FINDINGS: 7/2023  No evidence of an acute fracture or dislocation.  Alignment is maintained.  Intervertebral disc height loss worse at inferior lumbar levels.     Impression:     Degenerative change at  inferior lumbar levels without instability.    Past Medical History:   Diagnosis Date    Breast cancer 12/20/2021    Left breast IDC    Gallstone pancreatitis 09/2016    Gallstones     Hyperlipidemia     Hypertension      Past Surgical History:   Procedure Laterality Date    BREAST BIOPSY Bilateral 12/20/2021    Right side benign, Left side IDC    HYSTERECTOMY      BROOKS 3, AUB    TRANSFORAMINAL EPIDURAL INJECTION OF STEROID Left 6/18/2024    Procedure: LUMBAR TRANSFORAMINAL LEFT L5/S1 AND S1;  Surgeon: Bernard Song MD;  Location: [facility];  Service: Pain Management;  Laterality: Left;  [phone]  2 WK F/U CHRISTINA    TRANSFORAMINAL EPIDURAL INJECTION OF STEROID Left 12/30/2024    Procedure: LUMBAR TRANSFORAMINAL LEFT L4/5 AND L5/S1*ASPIRIN OTC* HOLD FOR 5 DAYS;  Surgeon: Bernard Song MD;  Location: [facility];  Service: Pain Management;  Laterality: Left;  [phone]  2 WK F/U ALEX     Social History     Socioeconomic History    Marital status: Single   Tobacco Use    Smoking status: Never    Smokeless tobacco: Never   Substance and Sexual Activity    Alcohol use: Yes     Comment: socially    Drug use: No    Sexual activity: Not Currently     Family History   Problem Relation Name Age of Onset    No Known Problems Mother      Heart attack Father      Coronary artery disease Father      Coronary artery disease Sister      Heart attack Sister      Skin cancer Neg Hx      Colon cancer Neg Hx      Breast cancer Neg Hx      Ovarian cancer Neg Hx         Review of patient's allergies indicates:  No Known Allergies    Current Outpatient Medications   Medication Sig    amLODIPine (NORVASC) 10 MG tablet Take 1 tablet (10 mg total) by mouth once daily.    anastrozole (ARIMIDEX) 1 mg Tab Take 1 tablet (1 mg total) by mouth once daily.    atorvastatin (LIPITOR) 40 MG tablet Take 1 tablet (40 mg total) by mouth once daily.    carvediloL (COREG) 6.25 MG tablet Take 1 tablet (6.25 mg total) by mouth 2 (two) times daily  with meals.    celecoxib (CELEBREX) 100 MG capsule Take 1 capsule (100 mg total) by mouth daily as needed for Pain.    diclofenac sodium (VOLTAREN) 1 % Gel Apply 2 g topically 4 (four) times daily as needed.    ergocalciferol (ERGOCALCIFEROL) 50,000 unit Cap Take 1 capsule (50,000 Units total) by mouth every 7 days.    fish oil-omega-3 fatty acids 300-1,000 mg capsule Take 1 capsule by mouth once daily.    hydroCHLOROthiazide (HYDRODIURIL) 25 MG tablet Take 1 tablet (25 mg total) by mouth once daily.    LIDOcaine (LIDODERM) 5 % Place 1 patch onto the skin once daily. Remove & Discard patch within 12 hours or as directed by MD    methocarbamoL (ROBAXIN) 500 MG Tab Take 2 tablets (1,000 mg total) by mouth 3 (three) times daily as needed (take before stretching).    valsartan (DIOVAN) 320 MG tablet Take 1 tablet (320 mg total) by mouth once daily.    aspirin (ECOTRIN) 81 MG EC tablet Take 1 tablet (81 mg total) by mouth once daily. (Patient not taking: Reported on 6/23/2022)    pregabalin (LYRICA) 50 MG capsule Take 1 capsule (50 mg total) by mouth 2 (two) times daily.     No current facility-administered medications for this visit.       REVIEW OF SYSTEMS:    GENERAL:  No weight loss, malaise or fevers.  HEENT:  Negative for frequent or significant headaches.  NECK:  Negative for lumps, goiter, pain and significant neck swelling.  RESPIRATORY:  Negative for cough, wheezing or shortness of breath.  CARDIOVASCULAR:  Negative for chest pain, leg swelling or palpitations.  GI:  Negative for abdominal discomfort, blood in stools or black stools or change in bowel habits.  MUSCULOSKELETAL:  See HPI. Left buttock pain and posterior leg pain.  SKIN:  Negative for lesions, rash, and itching.  PSYCH:  Negative for sleep disturbance, mood disorder and recent psychosocial stressors.  HEMATOLOGY/LYMPHOLOGY:  Negative for prolonged bleeding, bruising easily or swollen nodes.  NEURO:   No history of headaches, syncope, paralysis,  "seizures or tremors. Radiating pain down to left knee. Paraesthesia in lateral leg and foot.  All other reviewed and negative other than HPI.    OBJECTIVE:    BP (!) 154/80 (BP Location: Left arm, Patient Position: Sitting)   Pulse 86   Temp 98.6 °F (37 °C) (Oral)   Resp 18   Ht 5' 6" (1.676 m)   Wt 82.6 kg (182 lb 1.6 oz)   SpO2 100%   BMI 29.39 kg/m²     PHYSICAL EXAMINATION:     General appearance: Well appearing, in no acute distress, alert and oriented x3.  Psych:  Mood and affect appropriate.  Skin: Skin color, texture, turgor normal, no rashes or lesions, in both upper and lower body.  Head/face:  Normocephalic, atraumatic.   Cor: Rate regular.   Pulm: Bilateral chest rise. In no apparent respiratory distress.   Back: Straight leg raise is negative for radicular pain bilaterally. There is mild pain with palpation over left lumbar facet joints. Limited ROM with pain on flexion and extension.   Extremities: No deformities, edema, or skin discoloration. Good capillary refill.  Musculoskeletal: Bilateral lower extremity strength is normal and symmetric.  No atrophy or tone abnormalities are noted.   Neuro:  Diminished sensation along left lower leg and plantar aspect of foot.   Gait: Antalgic-ambulates with single point cane.        ASSESSMENT: 84 y.o. year old female with low back and leg pain, consistent with the followin. Lumbar stenosis with neurogenic claudication  Procedure Request Order for Pain Management    pregabalin (LYRICA) 50 MG capsule      2. Chronic midline low back pain with left-sided sciatica  Ambulatory referral/consult to [facility]    pregabalin (LYRICA) 50 MG capsule            PLAN:  Patient Education:  Counseled patient regarding the importance of activity modification, I have stressed the importance of physical activity and a home exercise plan to help with pain and improve health.  Therapy & Exercises:  continue physical therapy  Continue home exercises  Medications: "   Patient can continue with medications for now since they are providing benefits, using them appropriately, and without side effects.  Pregabalin 50mg 2x/day  Intervention:   Scheduled for caudal PRASAD given that patient's symptoms now bilateral  Future Considerations:   Consider knee Synvisc injection.  Patient previously received steroid injection over a year ago with benefit  Patient has evidence of ligamentous hypertrophy, consider MILD to improve canal stenosis and ambulation distance  Return to clinic:   after pain procedure      I would like to thank Oly Roque MD for the opportunity to assist in the care of this patient. We had a very nice visit and I look forward to continuing their care. Please let me know if I can be of further assistance.     The above plan and management options were discussed at length with patient. Patient is in agreement with the above and verbalized understanding. It will be communicated with the referring physician via electronic record, fax, or mail.      Preeti Dhillon MD  06/19/2025      I spent a total of 30 minutes on the day of the visit.  This includes face to face time and non-face to face time preparing to see the patient by reviewing previous labs/imaging, obtaining and/or reviewing separately obtained history, documenting clinical information in the electronic or other health record, independently interpreting results and communicating results to the patient/family/caregiver. This note was generated with the assistance of ambient listening technology. Verbal consent was obtained by the patient and accompanying visitor(s) for the recording of patient appointment to facilitate this note. I attest to having reviewed and edited the generated note for accuracy, though some syntax or spelling errors may persist. Please contact the author of this note for any clarification.       Domingo Banerjee

## 2025-06-19 NOTE — H&P (VIEW-ONLY)
Chronic Pain - Established Patient      Chief Complaint:   Chief Complaint   Patient presents with    Back Pain     Pt states she does not want any surgeries or procedure because they don't help.    Foot Pain     Pt states she has foot numbness on the left foot.    Hip Pain     Pain in hip spreads to feet        SUBJECTIVE:    Interval History 06/19/2025  Patient was referred to us by PCP for low back pain with left leg radiation.  However she has been seen by Dr. Song in the past for similar complaints.  She reports no pain in her groin area.  Her main pain is on the left leg with numbness and tingling in the lateral aspect of her leg toes.  She reports recently pain was mainly on the left but now she started to have tingling sensation on the right lower leg as well.  She was last prescribed Lyrica but was not taking persistently and now she is not sure if she still has on medication.  She wished for us to prescribe Lyrica.  She is currently doing PT but reports that it is not helping as well.  Denied any weakness, GI/ incontinence.      Interval History 3/6/2025:  Patient returns for follow up of back pain. Patient states that most recent PRASAD in December was not helpful; she did not experience any time period of relief. PRASAD in June was also not helpful. Pain is mostly located on the posterior L leg, 7/10 in intensity. Patient states that the lateral surface of the L leg from the knee to the foot feels numb. Patient is currently attending physical therapy and has experienced some degree of relief but worries that she is not doing the correct exercises. Taking BC powder as well as lidocaine patches for pain relief, roughly every other day. States that other pain medications are not helpful.    Interval History 10/14/2024:  The patient returns to clinic today for follow up of back and hip pain. She is s/p left piriformis muscle injection on 8/8/2024. She reports no relief. She reports low back pain that radiates  "into the posterolateral aspect of her left leg to the foot. She denies any right leg pain. Her pain is worse with walking. She is frustrated with her pain. She is no longer taking Gabapentin. She is taking Cymbalta with limited relief. She ran out of Robaxin. She denies any other health changes. Her pain today is 9/10.     Interval History 7/9/2024:  Karis Briceño returns to clinic s/p Left L5/S1 and S1 TFESI on 6/18/2024. She reports 100% relief of her back pain. Today, she reports numbness to the bottom of her left foot. She also reports left buttock pain that goes to the posterior thigh. She continues Gabapentin 300 mg TID. She reports some drowsiness during the day. She denies weakness or recent falls. She continues Physical Therapy, but does not continue HEP.  The patient denies fever/night sweats, urinary incontinence, bowel incontinence, significant weight changes, significant motor weakness or changes, or loss of sensations. Her pain today is 6/10.    Interval HPI 6/6/24:  Karis Briceño returns to clinic for follow up. [facility] ordered open MRI and CT abd and pelvis. Continued with PT and on gabapentin and robaxin for pain. Today reports pain that localizes to her left posterior hamstring with occasional back pain and left buttock pain with associated numbness on the top and bottom of her left foot. She also reports some "locking". When she ADF and APF her foot, she feels pain in her posterior hamstring. She ambulates with a cane and reports some weakness in her LLE. Pain is a 4/10. Made worse by walking. Improves with sitting and rest. She reports offloading her LLE. She reports stopping gabapentin. She reports some sleepiness in the afternoon. She ran out of robaxin. She feels that this helped. She has stopped going to PT due to foot numbness.     Initial HPI 5/22/24:  Karis Briceño presents to the clinic for the evaluation of left buttock and thigh pain. The pain started 3 weeks ago without inciting event " and symptoms have been unchanged.The pain is located in the left buttock and posterior thigh area and radiates to the left knee.  The pain is described as sharp and tingling and is rated as 7/10. The pain is rated with a score of  5/10 on the BEST day and a score of 8/10 on the WORST day.  Symptoms interfere with daily activity. The pain is exacerbated by Standing, Touching, Walking, and Extension.  The pain is mitigated by medications. She reports paraesthesia along her lateral LLE and lateral foot. She takes Gabapentin 300mg morning and 600mg QHS and BC powder with some relief.  She went to the ED 3 weeks ago and received Steroid injection, toradol injection, lidocaine patch, and Robaxin. Had previous Right L4/L5 TF PRASAD cancelled in 11/2023 due to anxiousness about procedure.    Patient denies night fever/night sweats, urinary incontinence, bowel incontinence, significant weight loss, significant motor weakness.    Physical Therapy/Home Exercise: currently in PT    Pain Disability Index Review:      6/19/2025     9:35 AM 3/5/2025     1:13 PM 10/14/2024     9:25 AM   Last 3 PDI Scores   Pain Disability Index (PDI) [phone]       Pain Medications:    BC arthritis powder     report:  Reviewed and consistent with medication use as prescribed.    Pain Procedures:   6/18/2024 - Left L5/S1 and S1 TFESI  8/8/2024- Left piriformis muscle injection- no relief    Imaging:   MRI Lumbar Spine 5/28/2024 ([facility]):  Findings:  There is 3 mm anterior offset of L3 relative to L4.     No acute compressions fracture. No pars defects are identified.     Modic type 2 discogenic edema is evident most notably at L5-S1.     T12-L1: the spinal canal and neural foramen are patent. There is no disc bulge or herniation. The disc is hydrated without loss of height.     L1-2: the spinal canal and neural foramen are patent. There is no disc bulge or herniation. The disc is hydrated without loss of height.     L2-3: there are bilateral  subarticular disc herniations, larger right, depth of 4.1 mm. There is flattening of the thecal sac contour across the right paracentral zone, combined with facet hypertrophy producing moderate right foraminal stenosis. Ligamentum flavum thickening is evident with mild canal stenosis. The disc is partially desiccated.     L3-4: a concentric, up to 3 mm depth disc bulge is evident exacerbated by anterior offset of L3. Combined with ligamentum flavum thickening, short pedicles and facet hypertrophy, there is moderate spinal canal and moderate right greater than left foraminal stenosis.     L4-5: A broad-based central/paracentral 6.2 mm disc herniation is present. There is severe flattening deformity of the thecal sac, residual AP diameter measuring 3 mm. The AP diameter of the spinal canal measures 8 mm. Associated peripheral spondylosis, bulging disc and facet hypertrophy produces moderate left greater than right foraminal stenosis.     L5-S1: a broad-based central/asymmetric left paracentral 5.4 mm disc herniation is present with extension into the foraminal zone. There is asymmetric posterior displacement of the descending left S1 nerve root. The AP diameter of the canal is mildly narrowed. There is severe left greater than right foraminal stenosis, bilateral facet hypertrophy with effusions are present.         CT Lumbar spine FINDINGS: 9/2023  Alignment: Grade 1 anterolisthesis of L3 on L4.     Vertebrae: No acute fracture.  Multilevel degenerative endplate changes.  No suspicious lytic or blastic lesions.     Discs: Degenerative disc space narrowing extending from L2-L3 through L5-S1.  Vacuum phenomenon at L2-L3, L4-L5 and L5-S1.  No endplate erosion.     Sacroiliac joints: Symmetric degenerative changes.     Degenerative findings:     T12-L1: No spinal canal stenosis.  No neural foraminal narrowing.     L1-L2: No spinal canal stenosis.  No neural foraminal narrowing     L2-L3: Circumferential disc bulge.   Bilateral facet arthropathy, bilateral ligamentum flavum hypertrophy and prominent posterior epidural fat.  Moderate spinal canal and lateral recess stenosis.  Moderate right and mild left neural foraminal narrowing.     L3-L4: Grade 1 anterolisthesis with uncovering the intervertebral disc.  Circumferential disc bulge.  Bilateral facet arthropathy, bilateral ligamentum flavum hypertrophy and prominent posterior epidural fat.  Severe spinal canal and lateral recess stenosis.  Moderate bilateral neural foraminal narrowing.     L4-L5: Circumferential disc bulge with a superimposed posterior central protrusion.  Bilateral facet arthropathy, bilateral ligamentum flavum hypertrophy and prominent posterior epidural fat.  Severe spinal canal and lateral recess stenosis.  Severe left and moderate to severe right neural foraminal narrowing.     L5-S1: Circumferential disc bulge.  Bilateral facet arthropathy, bilateral ligamentum flavum hypertrophy and prominent posterior epidural fat.  Moderate spinal canal stenosis.  Severe bilateral neural foraminal narrowing.     Paraspinal muscles & soft tissues: Multiple calcified gallstones.  0.7 cm calcified left renal stone.  0.9 cm left renal cortical lesion that demonstrates attenuation values higher than that of a simple cyst.  Trace atherosclerotic calcification of the abdominal aorta and iliac arteries.  Mild fatty degeneration of the posterior paraspinal musculature.     Impression:  Advanced lumbar spondylosis most severe from L2-L3 through L5-S1 as detailed above.  Cholelithiasis.  Left nonobstructing nephrolithiasis.  0.9 cm left renal cortical lesion that does not meet CT criteria for a simple cyst.  Recommend further characterization with a dedicated renal ultrasound.    LXR FINDINGS: 7/2023  No evidence of an acute fracture or dislocation.  Alignment is maintained.  Intervertebral disc height loss worse at inferior lumbar levels.     Impression:     Degenerative change at  inferior lumbar levels without instability.    Past Medical History:   Diagnosis Date    Breast cancer 12/20/2021    Left breast IDC    Gallstone pancreatitis 09/2016    Gallstones     Hyperlipidemia     Hypertension      Past Surgical History:   Procedure Laterality Date    BREAST BIOPSY Bilateral 12/20/2021    Right side benign, Left side IDC    HYSTERECTOMY      BROOKS 3, AUB    TRANSFORAMINAL EPIDURAL INJECTION OF STEROID Left 6/18/2024    Procedure: LUMBAR TRANSFORAMINAL LEFT L5/S1 AND S1;  Surgeon: Bernard Song MD;  Location: [facility];  Service: Pain Management;  Laterality: Left;  [phone]  2 WK F/U CHRISTINA    TRANSFORAMINAL EPIDURAL INJECTION OF STEROID Left 12/30/2024    Procedure: LUMBAR TRANSFORAMINAL LEFT L4/5 AND L5/S1*ASPIRIN OTC* HOLD FOR 5 DAYS;  Surgeon: Bernard Song MD;  Location: [facility];  Service: Pain Management;  Laterality: Left;  [phone]  2 WK F/U ALEX     Social History     Socioeconomic History    Marital status: Single   Tobacco Use    Smoking status: Never    Smokeless tobacco: Never   Substance and Sexual Activity    Alcohol use: Yes     Comment: socially    Drug use: No    Sexual activity: Not Currently     Family History   Problem Relation Name Age of Onset    No Known Problems Mother      Heart attack Father      Coronary artery disease Father      Coronary artery disease Sister      Heart attack Sister      Skin cancer Neg Hx      Colon cancer Neg Hx      Breast cancer Neg Hx      Ovarian cancer Neg Hx         Review of patient's allergies indicates:  No Known Allergies    Current Outpatient Medications   Medication Sig    amLODIPine (NORVASC) 10 MG tablet Take 1 tablet (10 mg total) by mouth once daily.    anastrozole (ARIMIDEX) 1 mg Tab Take 1 tablet (1 mg total) by mouth once daily.    atorvastatin (LIPITOR) 40 MG tablet Take 1 tablet (40 mg total) by mouth once daily.    carvediloL (COREG) 6.25 MG tablet Take 1 tablet (6.25 mg total) by mouth 2 (two) times daily  with meals.    celecoxib (CELEBREX) 100 MG capsule Take 1 capsule (100 mg total) by mouth daily as needed for Pain.    diclofenac sodium (VOLTAREN) 1 % Gel Apply 2 g topically 4 (four) times daily as needed.    ergocalciferol (ERGOCALCIFEROL) 50,000 unit Cap Take 1 capsule (50,000 Units total) by mouth every 7 days.    fish oil-omega-3 fatty acids 300-1,000 mg capsule Take 1 capsule by mouth once daily.    hydroCHLOROthiazide (HYDRODIURIL) 25 MG tablet Take 1 tablet (25 mg total) by mouth once daily.    LIDOcaine (LIDODERM) 5 % Place 1 patch onto the skin once daily. Remove & Discard patch within 12 hours or as directed by MD    methocarbamoL (ROBAXIN) 500 MG Tab Take 2 tablets (1,000 mg total) by mouth 3 (three) times daily as needed (take before stretching).    valsartan (DIOVAN) 320 MG tablet Take 1 tablet (320 mg total) by mouth once daily.    aspirin (ECOTRIN) 81 MG EC tablet Take 1 tablet (81 mg total) by mouth once daily. (Patient not taking: Reported on 6/23/2022)    pregabalin (LYRICA) 50 MG capsule Take 1 capsule (50 mg total) by mouth 2 (two) times daily.     No current facility-administered medications for this visit.       REVIEW OF SYSTEMS:    GENERAL:  No weight loss, malaise or fevers.  HEENT:  Negative for frequent or significant headaches.  NECK:  Negative for lumps, goiter, pain and significant neck swelling.  RESPIRATORY:  Negative for cough, wheezing or shortness of breath.  CARDIOVASCULAR:  Negative for chest pain, leg swelling or palpitations.  GI:  Negative for abdominal discomfort, blood in stools or black stools or change in bowel habits.  MUSCULOSKELETAL:  See HPI. Left buttock pain and posterior leg pain.  SKIN:  Negative for lesions, rash, and itching.  PSYCH:  Negative for sleep disturbance, mood disorder and recent psychosocial stressors.  HEMATOLOGY/LYMPHOLOGY:  Negative for prolonged bleeding, bruising easily or swollen nodes.  NEURO:   No history of headaches, syncope, paralysis,  "seizures or tremors. Radiating pain down to left knee. Paraesthesia in lateral leg and foot.  All other reviewed and negative other than HPI.    OBJECTIVE:    BP (!) 154/80 (BP Location: Left arm, Patient Position: Sitting)   Pulse 86   Temp 98.6 °F (37 °C) (Oral)   Resp 18   Ht 5' 6" (1.676 m)   Wt 82.6 kg (182 lb 1.6 oz)   SpO2 100%   BMI 29.39 kg/m²     PHYSICAL EXAMINATION:     General appearance: Well appearing, in no acute distress, alert and oriented x3.  Psych:  Mood and affect appropriate.  Skin: Skin color, texture, turgor normal, no rashes or lesions, in both upper and lower body.  Head/face:  Normocephalic, atraumatic.   Cor: Rate regular.   Pulm: Bilateral chest rise. In no apparent respiratory distress.   Back: Straight leg raise is negative for radicular pain bilaterally. There is mild pain with palpation over left lumbar facet joints. Limited ROM with pain on flexion and extension.   Extremities: No deformities, edema, or skin discoloration. Good capillary refill.  Musculoskeletal: Bilateral lower extremity strength is normal and symmetric.  No atrophy or tone abnormalities are noted.   Neuro:  Diminished sensation along left lower leg and plantar aspect of foot.   Gait: Antalgic-ambulates with single point cane.        ASSESSMENT: 84 y.o. year old female with low back and leg pain, consistent with the followin. Lumbar stenosis with neurogenic claudication  Procedure Request Order for Pain Management    pregabalin (LYRICA) 50 MG capsule      2. Chronic midline low back pain with left-sided sciatica  Ambulatory referral/consult to [facility]    pregabalin (LYRICA) 50 MG capsule            PLAN:  Patient Education:  Counseled patient regarding the importance of activity modification, I have stressed the importance of physical activity and a home exercise plan to help with pain and improve health.  Therapy & Exercises:  continue physical therapy  Continue home exercises  Medications: "   Patient can continue with medications for now since they are providing benefits, using them appropriately, and without side effects.  Pregabalin 50mg 2x/day  Intervention:   Scheduled for caudal PRASAD given that patient's symptoms now bilateral  Future Considerations:   Consider knee Synvisc injection.  Patient previously received steroid injection over a year ago with benefit  Patient has evidence of ligamentous hypertrophy, consider MILD to improve canal stenosis and ambulation distance  Return to clinic:   after pain procedure      I would like to thank Oly Roque MD for the opportunity to assist in the care of this patient. We had a very nice visit and I look forward to continuing their care. Please let me know if I can be of further assistance.     The above plan and management options were discussed at length with patient. Patient is in agreement with the above and verbalized understanding. It will be communicated with the referring physician via electronic record, fax, or mail.      Preeti Dhillon MD  06/19/2025      I spent a total of 30 minutes on the day of the visit.  This includes face to face time and non-face to face time preparing to see the patient by reviewing previous labs/imaging, obtaining and/or reviewing separately obtained history, documenting clinical information in the electronic or other health record, independently interpreting results and communicating results to the patient/family/caregiver. This note was generated with the assistance of ambient listening technology. Verbal consent was obtained by the patient and accompanying visitor(s) for the recording of patient appointment to facilitate this note. I attest to having reviewed and edited the generated note for accuracy, though some syntax or spelling errors may persist. Please contact the author of this note for any clarification.       Domingo Banerjee

## 2025-06-20 ENCOUNTER — TELEPHONE (OUTPATIENT)
Dept: PAIN MEDICINE | Facility: CLINIC | Age: 85
End: 2025-06-20
Payer: MEDICARE

## 2025-06-23 ENCOUNTER — TELEPHONE (OUTPATIENT)
Dept: HEMATOLOGY/ONCOLOGY | Facility: CLINIC | Age: 85
End: 2025-06-23
Payer: MEDICARE

## 2025-07-03 ENCOUNTER — PATIENT MESSAGE (OUTPATIENT)
Dept: PAIN MEDICINE | Facility: CLINIC | Age: 85
End: 2025-07-03
Payer: MEDICARE

## 2025-07-10 DIAGNOSIS — M48.062 LUMBAR STENOSIS WITH NEUROGENIC CLAUDICATION: Primary | ICD-10-CM

## 2025-07-11 ENCOUNTER — OFFICE VISIT (OUTPATIENT)
Dept: HEMATOLOGY/ONCOLOGY | Facility: CLINIC | Age: 85
End: 2025-07-11
Payer: MEDICARE

## 2025-07-11 VITALS
DIASTOLIC BLOOD PRESSURE: 74 MMHG | SYSTOLIC BLOOD PRESSURE: 146 MMHG | TEMPERATURE: 99 F | HEART RATE: 66 BPM | OXYGEN SATURATION: 99 %

## 2025-07-11 DIAGNOSIS — Z17.0 MALIGNANT NEOPLASM OF LOWER-OUTER QUADRANT OF LEFT BREAST OF FEMALE, ESTROGEN RECEPTOR POSITIVE: Primary | ICD-10-CM

## 2025-07-11 DIAGNOSIS — Z79.811 AROMATASE INHIBITOR USE: ICD-10-CM

## 2025-07-11 DIAGNOSIS — C50.512 MALIGNANT NEOPLASM OF LOWER-OUTER QUADRANT OF LEFT BREAST OF FEMALE, ESTROGEN RECEPTOR POSITIVE: Primary | ICD-10-CM

## 2025-07-11 PROCEDURE — 99999 PR PBB SHADOW E&M-EST. PATIENT-LVL III: CPT | Mod: PBBFAC,,, | Performed by: INTERNAL MEDICINE

## 2025-07-11 RX ORDER — ANASTROZOLE 1 MG/1
1 TABLET ORAL DAILY
Qty: 90 TABLET | Refills: 3 | Status: SHIPPED | OUTPATIENT
Start: 2025-07-11 | End: 2026-07-11

## 2025-07-11 NOTE — PROGRESS NOTES
[facility] Note:      Chief Complaint:   Encounter Diagnoses   Name Primary?    Malignant neoplasm of lower-outer quadrant of left breast of female, estrogen receptor positive Yes    Aromatase inhibitor use             Cancer Staging   Malignant neoplasm of lower-outer quadrant of left breast of female, estrogen receptor positive  Staging form: Breast, AJCC 8th Edition  - Clinical stage from 12/20/2021: cT2, cN0, cM0, G1, ER+, [place]: Not Assessed, HER2: Not Assessed - Signed by Aliza Olvera MD on 1/6/2022        HPI:  Karis Briceño is a 84 y.o. female who presents today for follow up of breast cancer on endocrine therapy. She is tolerating anastrazole well without any issues. Patient has opted against surgery and will continue on Anastrazole since 2021.   She continues to have LLE pain with numbness. Following with pain management and with PT    Oncology History  Patient felt a mass in the left breast in October 2021  Diagnostic mammogram 10/25/21 demonstrated bilateral breast masses with recommendation for biopsy  Bilateral breast biopsy 12/20/21 showed a R breast IDC, grade 1, diffuse staining for estrogen and a L breast fibroadenoma   Patient's pathology was initially sent to [place], therefore no receptors were initially run    Patient has a history of HRT which she took for 10 years. She stopped these >10 years ago.         Patient Active Problem List   Diagnosis    Essential hypertension    Cholelithiasis    Refused influenza vaccine    Overweight (BMI 25.0-29.9)    Prediabetes    Mixed hyperlipidemia    Poor dentition    Chronic left shoulder pain    Decreased strength of upper extremity    Decreased functional mobility    Impaired range of motion of cervical spine    Malignant neoplasm of lower-outer quadrant of left breast of female, estrogen receptor positive    Nonrheumatic aortic valve stenosis    Chronic right-sided low back pain with right-sided sciatica     History of breast cancer    Vitamin D deficiency       Current Outpatient Medications   Medication Instructions    amLODIPine (NORVASC) 10 mg, Oral, Daily    anastrozole (ARIMIDEX) 1 mg, Oral, Daily    aspirin (ECOTRIN) 81 mg, Oral, Daily    atorvastatin (LIPITOR) 40 mg, Oral, Daily    carvediloL (COREG) 6.25 mg, Oral, 2 times daily with meals    celecoxib (CELEBREX) 100 mg, Oral, Daily PRN    diclofenac sodium (VOLTAREN) 2 g, Topical (Top), 4 times daily PRN    fish oil-omega-3 fatty acids 300-1,000 mg capsule 1 capsule, Daily    hydroCHLOROthiazide (HYDRODIURIL) 25 mg, Oral, Daily    LIDOcaine (LIDODERM) 5 % 1 patch, Transdermal, Daily, Remove & Discard patch within 12 hours or as directed by MD    methocarbamoL (ROBAXIN) 1,000 mg, Oral, 3 times daily PRN    pregabalin (LYRICA) 50 mg, Oral, 2 times daily    valsartan (DIOVAN) 320 mg, Oral, Daily    VITAMIN D2 50,000 Units, Oral, Every 7 days       Review of Systems:   Review of Systems   Constitutional: Negative.    HENT: Negative.     Respiratory: Negative.     Cardiovascular: Negative.    Gastrointestinal: Negative.    Musculoskeletal: Negative.    Neurological: Negative.    Endo/Heme/Allergies: Negative.    All other systems reviewed and are negative.      PHYSICAL EXAM:  BP (!) 146/74 (BP Location: Right arm, Patient Position: Sitting)   Pulse 66   Temp 98.5 °F (36.9 °C) (Oral)   SpO2 99%     General Appearance:    Alert, cooperative, no distress, appears stated age   Lungs:     Clear to auscultation bilaterally, respirations unlabored    Heart:    Regular rate and rhythm, S1 and S2 normal   Breast Exam:    No palpable abnormalities, L breast > R breast     Abdomen:     Soft, non-tender, bowel sounds active, no masses, no organomegaly   Extremities:   Extremities normal, atraumatic, no cyanosis or edema   Pulses:   2+ and symmetric all extremities   Skin:   Skin color, texture, turgor normal, no rashes or lesions   Lymph nodes:   Cervical,  supraclavicular, and axillary nodes normal           Pertinent Labs & Imaging:  Diagnostic mammogram 10/25/21:   Impression:  Left  Mass: Left breast mass at the anterior 6 o'clock position. Assessment: 4 - Suspicious finding. Biopsy is recommended.   Right  Mass: Right breast 6 mm mass at the 10 o'clock position. Assessment: 4 - Suspicious finding. Biopsy is recommended.   BI-RADS Category: Overall: 4 - Suspicious    Bilateral breast ultrasound 12/20/21  1. BREAST, LEFT, 06:00, BIOPSY:   -. Invasive ductal carcinoma, see [place] Consultative Report below.   - Size of invasive carcinoma:  3 MM in greatest linear dimension within a single core biopsy fragment.   - Aminta Histologic Score: Grade 1 of 3.                     Tubule Formation:  2                     Nuclear Pleomorphism:  2                     Mitotic Activity:  1   - No definitive in-situ component identified.   - No lymphovascular invasion.   - Microcalcifications:  Not identified.   - Breast biomarkers will be performed upon return of materials from [facility].   2. BREAST, RIGHT, 10:00, BIOPSY:   - Benign breast tissue with stromal fibrosis and fibroadenomatoid nodules.   - Microcalcifications:  Not identified.   - Negative for atypia or malignancy.     [place] FINAL DIAGNOSIS:   Interpretation   Breast, core biopsy (12/20/2021):   1. Left breast, 6:00: Invasive ductal carcinoma with prominent cribriform growth pattern, Aminta grade I (of III).   The submitted immunostains for SMA, p63 and CK 5/6 show absence of myoepithelial cell layer in the tumor. The tumor is diffusely positive for ER. These findings support the above diagnosis.   2. Right breast, 10:00: Fibroadenomatoid nodule.     Ultrasound 10/10/22: R breast 4mm x 5mm x 6mm (decreased from 7 mm x 6 mm x 7 mm in June 2022)    No results found for this or any previous visit (from the past 24 hours).    Assessment & Plan:    1. Malignant neoplasm of lower-outer quadrant of left breast of  female, estrogen receptor positive  - anastrozole (ARIMIDEX) 1 mg Tab; Take 1 tablet (1 mg total) by mouth once daily.  Dispense: 90 tablet; Refill: 3    2. Aromatase inhibitor use      Patient is on with anastrozole, initially as NAET intent, but has deferred surgery Overall she is tolerating this well without major side effects.   Follow up breast imaging on March 2025 showed continued NE to therapy.   Will plan for AI indefinitely. Refill today  Continue gabapentin 300mg QAM/ 600mg QPM for sciatic pain and follow with pain management    We did discuss that one of the possible side effects while on an AI is bone loss or osteoporosis. To help prevent this possible side effect, we advised that she continue taking daily Calcium and Vitamin D supplements. The daily recommended doses are 1000 IU of Vitamin D and 1200mg of Calcium. She can buy these supplements, such as Oscal-D® or Caltrate®, at most local stores. If she has osteopenia or osteoporosis on bone density, we will discuss Prolia in the near future.     Joint pain is a common side effect of an AI. Pain and aching in the bones and joints can range from mild discomfort that goes away by itself, to severe achiness that may require medication. We have suggested using over-the-counter, non-steroidal anti-inflammatory medications like Ibuprofen if she were to experience this side effect.     Patient is agreeable to this plan.       Med Onc Chart Routing      Follow up with physician 6 months.   Follow up with CHRISTINA    Infusion scheduling note    Injection scheduling note    Labs    Imaging   Mammogram 3/2026   Pharmacy appointment    Other referrals                MDM includes  :    - Acute or chronic illness or injury that poses a threat to life or bodily function  - Independent review and explanation of 1 results from unique tests  - Discussion of management and ordering 1 unique tests  - Extensive discussion of treatment and management  - Prescription drug  management  - Drug therapy requiring intensive monitoring for toxicity        Aliza Olvera MD  07/11/2025

## 2025-07-17 ENCOUNTER — HOSPITAL ENCOUNTER (OUTPATIENT)
Facility: OTHER | Age: 85
Discharge: HOME | End: 2025-07-17
Attending: ANESTHESIOLOGY | Admitting: ANESTHESIOLOGY
Payer: MEDICARE

## 2025-07-17 VITALS
BODY MASS INDEX: 28.61 KG/M2 | HEART RATE: 68 BPM | DIASTOLIC BLOOD PRESSURE: 65 MMHG | WEIGHT: 178 LBS | HEIGHT: 66 IN | TEMPERATURE: 98 F | RESPIRATION RATE: 18 BRPM | OXYGEN SATURATION: 97 % | SYSTOLIC BLOOD PRESSURE: 140 MMHG

## 2025-07-17 DIAGNOSIS — M48.062 LUMBAR STENOSIS WITH NEUROGENIC CLAUDICATION: Primary | ICD-10-CM

## 2025-07-17 DIAGNOSIS — G89.29 CHRONIC PAIN: ICD-10-CM

## 2025-07-17 PROCEDURE — 99152 MOD SED SAME PHYS/QHP 5/>YRS: CPT | Performed by: ANESTHESIOLOGY

## 2025-07-17 PROCEDURE — 25500020 PHARM REV CODE 255: Performed by: ANESTHESIOLOGY

## 2025-07-17 PROCEDURE — 62323 NJX INTERLAMINAR LMBR/SAC: CPT | Mod: ,,, | Performed by: ANESTHESIOLOGY

## 2025-07-17 PROCEDURE — 99152 MOD SED SAME PHYS/QHP 5/>YRS: CPT | Mod: ,,, | Performed by: ANESTHESIOLOGY

## 2025-07-17 PROCEDURE — 62323 NJX INTERLAMINAR LMBR/SAC: CPT | Performed by: ANESTHESIOLOGY

## 2025-07-17 PROCEDURE — 63600175 PHARM REV CODE 636 W HCPCS: Performed by: ANESTHESIOLOGY

## 2025-07-17 RX ORDER — LIDOCAINE HYDROCHLORIDE 10 MG/ML
INJECTION, SOLUTION EPIDURAL; INFILTRATION; INTRACAUDAL; PERINEURAL
Status: DISCONTINUED | OUTPATIENT
Start: 2025-07-17 | End: 2025-07-17 | Stop reason: HOSPADM

## 2025-07-17 RX ORDER — SODIUM CHLORIDE 9 MG/ML
INJECTION, SOLUTION INTRAVENOUS CONTINUOUS
Status: DISCONTINUED | OUTPATIENT
Start: 2025-07-17 | End: 2025-07-17 | Stop reason: HOSPADM

## 2025-07-17 RX ORDER — DEXAMETHASONE SODIUM PHOSPHATE 10 MG/ML
INJECTION, SOLUTION INTRA-ARTICULAR; INTRALESIONAL; INTRAMUSCULAR; INTRAVENOUS; SOFT TISSUE
Status: DISCONTINUED | OUTPATIENT
Start: 2025-07-17 | End: 2025-07-17 | Stop reason: HOSPADM

## 2025-07-17 RX ORDER — LIDOCAINE HYDROCHLORIDE 20 MG/ML
INJECTION, SOLUTION INFILTRATION; PERINEURAL
Status: DISCONTINUED | OUTPATIENT
Start: 2025-07-17 | End: 2025-07-17 | Stop reason: HOSPADM

## 2025-07-17 RX ORDER — MIDAZOLAM HYDROCHLORIDE 1 MG/ML
INJECTION INTRAMUSCULAR; INTRAVENOUS
Status: DISCONTINUED | OUTPATIENT
Start: 2025-07-17 | End: 2025-07-17 | Stop reason: HOSPADM

## 2025-07-17 RX ORDER — FENTANYL CITRATE 50 UG/ML
INJECTION, SOLUTION INTRAMUSCULAR; INTRAVENOUS
Status: DISCONTINUED | OUTPATIENT
Start: 2025-07-17 | End: 2025-07-17 | Stop reason: HOSPADM

## 2025-07-17 NOTE — DISCHARGE SUMMARY
Discharge Note  Short Stay      SUMMARY     Admit Date: 7/17/2025    Attending Physician: Tatiana Chow MD    Discharge Physician: Isidro Girard MD      Discharge Date: 7/17/2025 10:32 AM    Procedure(s) (LRB):  CAUDAL PRASAD *ASPIRIN OTC* HOLD FOR 5 DAYS *EISSA PT* (N/A)    Final Diagnosis: Lumbar stenosis with neurogenic claudication [M48.062]    Disposition: Home or self care    Patient Instructions:   Current Discharge Medication List        CONTINUE these medications which have NOT CHANGED    Details   amLODIPine (NORVASC) 10 MG tablet Take 1 tablet (10 mg total) by mouth once daily.  Qty: 90 tablet, Refills: 3    Comments: .  Associated Diagnoses: Essential hypertension      anastrozole (ARIMIDEX) 1 mg Tab Take 1 tablet (1 mg total) by mouth once daily.  Qty: 90 tablet, Refills: 3    Associated Diagnoses: Malignant neoplasm of lower-outer quadrant of left breast of female, estrogen receptor positive      aspirin (ECOTRIN) 81 MG EC tablet Take 1 tablet (81 mg total) by mouth once daily.  Qty: 90 tablet, Refills: 3    Associated Diagnoses: Mixed hyperlipidemia      atorvastatin (LIPITOR) 40 MG tablet Take 1 tablet (40 mg total) by mouth once daily.  Qty: 90 tablet, Refills: 3    Associated Diagnoses: Mixed hyperlipidemia      carvediloL (COREG) 6.25 MG tablet Take 1 tablet (6.25 mg total) by mouth 2 (two) times daily with meals.  Qty: 180 tablet, Refills: 3    Comments: .  Associated Diagnoses: Essential hypertension      celecoxib (CELEBREX) 100 MG capsule Take 1 capsule (100 mg total) by mouth daily as needed for Pain.  Qty: 30 capsule, Refills: 1    Associated Diagnoses: Chronic midline low back pain with left-sided sciatica; Spasm of left piriformis muscle      diclofenac sodium (VOLTAREN) 1 % Gel Apply 2 g topically 4 (four) times daily as needed.  Qty: 100 g, Refills: 11    Associated Diagnoses: Chronic left shoulder pain      ergocalciferol (ERGOCALCIFEROL) 50,000 unit Cap Take 1 capsule (50,000 Units  total) by mouth every 7 days.  Qty: 12 capsule, Refills: 3    Associated Diagnoses: Vitamin D deficiency      fish oil-omega-3 fatty acids 300-1,000 mg capsule Take 1 capsule by mouth once daily.      hydroCHLOROthiazide (HYDRODIURIL) 25 MG tablet Take 1 tablet (25 mg total) by mouth once daily.  Qty: 90 tablet, Refills: 3    Comments: .  Associated Diagnoses: Essential hypertension      LIDOcaine (LIDODERM) 5 % Place 1 patch onto the skin once daily. Remove & Discard patch within 12 hours or as directed by MD  Qty: 15 patch, Refills: 0      methocarbamoL (ROBAXIN) 500 MG Tab Take 2 tablets (1,000 mg total) by mouth 3 (three) times daily as needed (take before stretching).  Qty: 45 tablet, Refills: 1    Associated Diagnoses: Chronic midline low back pain with left-sided sciatica; Spasm of left piriformis muscle      pregabalin (LYRICA) 50 MG capsule Take 1 capsule (50 mg total) by mouth 2 (two) times daily.  Qty: 60 capsule, Refills: 0    Associated Diagnoses: Chronic midline low back pain with left-sided sciatica; Lumbar stenosis with neurogenic claudication      valsartan (DIOVAN) 320 MG tablet Take 1 tablet (320 mg total) by mouth once daily.  Qty: 90 tablet, Refills: 3    Comments: .  Associated Diagnoses: Essential hypertension                 Discharge Diagnosis: Lumbar stenosis with neurogenic claudication [M48.062]  Condition on Discharge: Stable with no complications to procedure   Diet on Discharge: Same as before.  Activity: as per instruction sheet.  Discharge to: Home with a responsible adult.  Follow up: 2-4 weeks       Please call my office or pager at [phone] if experienced any weakness or loss of sensation, fever > 101.5, pain uncontrolled with oral medications, persistent nausea/vomiting/or diarrhea, redness or drainage from the incisions, or any other worrisome concerns. If physician on call was not reached or could not communicate with our office for any reason please go to the nearest  emergency department      Isidro Girard M.D.  PGY-5  Interventional Pain Management Fellow  [facility]  Pager: [phone]    07/17/2025

## 2025-07-17 NOTE — OP NOTE
Caudal Epidural Steroid Injection with Catheter under Fluoroscopic Guidance    The procedure, risks, benefits, and options were discussed with the patient. There are no contraindications to the procedure. The patent expressed understanding and agreed to the procedure. Informed written consent was obtained prior to the start of the procedure and can be found in the patient's chart.    PATIENT NAME: Karis Briceño   MRN: [id number]     DATE OF PROCEDURE: 07/17/2025    PROCEDURE: Caudal Epidural Steroid Injection under Fluoroscopic Guidance    PRE-OP DIAGNOSIS: Lumbar stenosis with neurogenic claudication [M48.062] Lumbar radiculopathy [M54.16]    POST-OP DIAGNOSIS: Same    PHYSICIAN: Tatiana Chow MD    ASSISTANTS: MD Mervin Das MS4     MEDICATIONS INJECTED: Preservative-free Decadron 10mg with 4cc of Lidocaine 1% MPF     LOCAL ANESTHETIC INJECTED: Xylocaine 2%     SEDATION: Versed 1mg and Fentanyl 50mcg                                                                                                                                                                                     Conscious sedation ordered by MPAOLA. Patient re-evaluation prior to administration of conscious sedation. No changes noted in patient's status from initial evaluation. The patient's vital signs were monitored by RN and patient remained hemodynamically stable throughout the procedure.    Event Time In   Sedation Start 1039   Sedation End 1058       ESTIMATED BLOOD LOSS: None    COMPLICATIONS: None    TECHNIQUE: Time-out was performed to identify the patient and procedure to be performed. With the patient laying in a prone position, the surgical area was prepped and draped in the usual sterile fashion using ChloraPrep and a fenestrated drape. The injection site was determined under fluoroscopy guidance. Skin anesthesia was achieved by injecting Lidocaine 2% over the injection site. The sacrum and sacral cornua were then approached  with a 16 gauge, 3.5 inch epidural needle that was introduced and advanced into the sacral hiatus under fluoroscopic guidance with AP, lateral and/or contralateral oblique imaging. After the needle passed through the sacrococcygeal ligament, the needle angle was lowered and the needle was advanced 1 cm. After negative aspiration of blood or CSF, and no evidence of paraesthesias, the catheter was threaded through the needle into the epidural space using live fluoroscopy, contrast dye Omnipaque (300mg/mL) was injected to confirm placement and there was no vascular runoff. Fluoroscopic imaging in the AP and lateral views revealed a clear outline of the spinal nerve with proximal spread of agent through the caudal epidural space. Then 6 mL of the medication mixture listed above was injected slowly. Displacement of the radio opaque contrast after injection of the medication confirmed that the medication went into the area of the transforaminal spaces. The needles were removed and bleeding was nil. A sterile dressing was applied. No specimens collected. The patient tolerated the procedure well.       The patient was monitored after the procedure in the recovery area. They were given post-procedure and discharge instructions to follow at home. The patient was discharged in a stable condition.    Isidro Girard MD     I reviewed and edited the fellow's note. I conducted my own interview and physical examination. I agree with the findings. I was present and supervising all critical portions of the procedure.

## 2025-07-17 NOTE — DISCHARGE INSTRUCTIONS

## 2025-08-04 ENCOUNTER — TELEPHONE (OUTPATIENT)
Dept: PAIN MEDICINE | Facility: CLINIC | Age: 85
End: 2025-08-04
Payer: MEDICARE

## 2025-08-04 NOTE — TELEPHONE ENCOUNTER
"Rescheduled to Virtual Visit with RY Gillette NP on 08/15/25. Patient expressed understanding and gratitude for phone call.  Call ended.      "----- Message from Nurse Hudson sent at 8/4/2025 10:45 AM CDT -----  Regarding: Call back  Call back about appointment.  "  "

## 2025-08-04 NOTE — TELEPHONE ENCOUNTER
Currently busy. Asked for call back later today. Call ended.    Reminder set.    Virtual Visit slot held 08/08/25 @ 0800 for reschedule.     Copied from CRM #[id number]. Topic: Appointments - Appointment Access  >> Aug 2, 2025  3:08 PM Xiao wrote:  Patient is calling to speak with someone in clinic due to she received a text saying she missed appt Friday but she spoke with someone an cancelled it she would like a call back. Please contact pt

## 2025-08-11 ENCOUNTER — PATIENT MESSAGE (OUTPATIENT)
Dept: HEMATOLOGY/ONCOLOGY | Facility: CLINIC | Age: 85
End: 2025-08-11
Payer: MEDICARE

## 2025-08-15 ENCOUNTER — TELEPHONE (OUTPATIENT)
Dept: PAIN MEDICINE | Facility: CLINIC | Age: 85
End: 2025-08-15
Payer: MEDICARE

## 2025-08-25 ENCOUNTER — PATIENT OUTREACH (OUTPATIENT)
Dept: ADMINISTRATIVE | Facility: HOSPITAL | Age: 85
End: 2025-08-25
Payer: MEDICARE

## 2025-08-29 ENCOUNTER — TELEPHONE (OUTPATIENT)
Dept: INTERNAL MEDICINE | Facility: CLINIC | Age: 85
End: 2025-08-29
Payer: MEDICARE

## 2025-08-29 ENCOUNTER — NURSE TRIAGE (OUTPATIENT)
Dept: ADMINISTRATIVE | Facility: CLINIC | Age: 85
End: 2025-08-29
Payer: MEDICARE